# Patient Record
Sex: MALE | Race: OTHER | Employment: UNEMPLOYED | URBAN - METROPOLITAN AREA
[De-identification: names, ages, dates, MRNs, and addresses within clinical notes are randomized per-mention and may not be internally consistent; named-entity substitution may affect disease eponyms.]

---

## 2017-01-02 ENCOUNTER — HOSPITAL ENCOUNTER (OUTPATIENT)
Dept: GENERAL RADIOLOGY | Facility: HOSPITAL | Age: 62
Discharge: HOME | End: 2017-01-02
Attending: FAMILY MEDICINE
Payer: COMMERCIAL

## 2017-01-02 DIAGNOSIS — R51.9 PAIN IN THE HEAD: ICD-10-CM

## 2017-01-02 PROCEDURE — 70250 X-RAY EXAM OF SKULL: CPT

## 2017-03-11 ENCOUNTER — HOSPITAL ENCOUNTER (OUTPATIENT)
Dept: GENERAL RADIOLOGY | Facility: HOSPITAL | Age: 62
Discharge: HOME | End: 2017-03-11
Attending: FAMILY MEDICINE
Payer: COMMERCIAL

## 2017-03-11 DIAGNOSIS — Z01.818 PRE-OP TESTING: ICD-10-CM

## 2017-03-11 PROCEDURE — 71020 XR CHEST PA + LAT CHEST (CPT=71020): CPT

## 2017-03-22 ENCOUNTER — HOSPITAL ENCOUNTER (OUTPATIENT)
Dept: GENERAL RADIOLOGY | Facility: HOSPITAL | Age: 62
Discharge: HOME | End: 2017-03-22
Attending: FAMILY MEDICINE
Payer: COMMERCIAL

## 2017-03-22 DIAGNOSIS — R93.89 ABNORMAL CHEST X-RAY: ICD-10-CM

## 2017-03-22 PROCEDURE — 71020 XR CHEST PA + LAT CHEST (CPT=71020): CPT

## 2017-04-20 ENCOUNTER — HOSPITAL ENCOUNTER (OUTPATIENT)
Dept: GENERAL RADIOLOGY | Facility: HOSPITAL | Age: 62
Discharge: HOME | End: 2017-04-20
Attending: FAMILY MEDICINE
Payer: COMMERCIAL

## 2017-04-20 DIAGNOSIS — M25.521 RIGHT ELBOW PAIN: ICD-10-CM

## 2017-04-20 PROCEDURE — 73080 X-RAY EXAM OF ELBOW: CPT

## 2017-06-29 ENCOUNTER — HOSPITAL ENCOUNTER (OUTPATIENT)
Dept: GENERAL RADIOLOGY | Facility: HOSPITAL | Age: 62
Discharge: HOME | End: 2017-06-29
Attending: FAMILY MEDICINE
Payer: COMMERCIAL

## 2017-06-29 DIAGNOSIS — Z01.818 PRE-OP TESTING: ICD-10-CM

## 2017-06-29 PROCEDURE — 71020 XR CHEST PA + LAT CHEST (CPT=71020): CPT | Performed by: FAMILY MEDICINE

## 2018-01-19 ENCOUNTER — OFFICE VISIT (OUTPATIENT)
Dept: INTERNAL MEDICINE CLINIC | Facility: CLINIC | Age: 63
End: 2018-01-19

## 2018-01-19 VITALS
DIASTOLIC BLOOD PRESSURE: 74 MMHG | OXYGEN SATURATION: 99 % | HEIGHT: 67.5 IN | HEART RATE: 69 BPM | RESPIRATION RATE: 17 BRPM | SYSTOLIC BLOOD PRESSURE: 118 MMHG | BODY MASS INDEX: 33.2 KG/M2 | WEIGHT: 214 LBS

## 2018-01-19 DIAGNOSIS — E11.9 TYPE 2 DIABETES MELLITUS WITHOUT COMPLICATION, WITHOUT LONG-TERM CURRENT USE OF INSULIN (HCC): Primary | ICD-10-CM

## 2018-01-19 DIAGNOSIS — E66.09 CLASS 1 OBESITY DUE TO EXCESS CALORIES WITH SERIOUS COMORBIDITY AND BODY MASS INDEX (BMI) OF 32.0 TO 32.9 IN ADULT: ICD-10-CM

## 2018-01-19 DIAGNOSIS — Z12.5 SCREENING FOR PROSTATE CANCER: ICD-10-CM

## 2018-01-19 DIAGNOSIS — Z12.11 SCREENING FOR COLON CANCER: ICD-10-CM

## 2018-01-19 DIAGNOSIS — L43.9 LICHEN PLANUS: ICD-10-CM

## 2018-01-19 PROBLEM — Z86.008 HISTORY OF CARCINOMA IN SITU OF BLADDER: Status: ACTIVE | Noted: 2018-01-19

## 2018-01-19 PROBLEM — Z98.890 HISTORY OF OPEN REDUCTION AND INTERNAL FIXATION (ORIF) PROCEDURE: Status: ACTIVE | Noted: 2018-01-19

## 2018-01-19 PROBLEM — Z87.891 PERSONAL HISTORY OF TOBACCO USE: Status: ACTIVE | Noted: 2018-01-19

## 2018-01-19 PROBLEM — E66.811 CLASS 1 OBESITY DUE TO EXCESS CALORIES WITH SERIOUS COMORBIDITY AND BODY MASS INDEX (BMI) OF 32.0 TO 32.9 IN ADULT: Status: ACTIVE | Noted: 2018-01-19

## 2018-01-19 LAB
ALBUMIN SERPL BCP-MCNC: 4.3 G/DL (ref 3.5–4.8)
ALBUMIN/GLOB SERPL: 1.5 {RATIO} (ref 1–2)
ALP SERPL-CCNC: 49 U/L (ref 32–100)
ALT SERPL-CCNC: 25 U/L (ref 17–63)
ANION GAP SERPL CALC-SCNC: 11 MMOL/L (ref 0–18)
AST SERPL-CCNC: 24 U/L (ref 15–41)
BILIRUB SERPL-MCNC: 0.8 MG/DL (ref 0.3–1.2)
BUN SERPL-MCNC: 12 MG/DL (ref 8–20)
BUN/CREAT SERPL: 14.3 (ref 10–20)
CALCIUM SERPL-MCNC: 9.3 MG/DL (ref 8.5–10.5)
CHLORIDE SERPL-SCNC: 105 MMOL/L (ref 95–110)
CHOLEST SERPL-MCNC: 131 MG/DL (ref 110–200)
CO2 SERPL-SCNC: 21 MMOL/L (ref 22–32)
CREAT SERPL-MCNC: 0.84 MG/DL (ref 0.5–1.5)
GLOBULIN PLAS-MCNC: 2.9 G/DL (ref 2.5–3.7)
GLUCOSE SERPL-MCNC: 129 MG/DL (ref 70–99)
HBA1C MFR BLD: 6.6 % (ref 4–6)
HDLC SERPL-MCNC: 45 MG/DL
LDLC SERPL CALC-MCNC: 67 MG/DL (ref 0–99)
NONHDLC SERPL-MCNC: 86 MG/DL
OSMOLALITY UR CALC.SUM OF ELEC: 285 MOSM/KG (ref 275–295)
POTASSIUM SERPL-SCNC: 3.9 MMOL/L (ref 3.3–5.1)
PROT SERPL-MCNC: 7.2 G/DL (ref 5.9–8.4)
PSA SERPL-MCNC: 0.6 NG/ML (ref 0–4)
SODIUM SERPL-SCNC: 137 MMOL/L (ref 136–144)
TRIGL SERPL-MCNC: 97 MG/DL (ref 1–149)

## 2018-01-19 PROCEDURE — 83036 HEMOGLOBIN GLYCOSYLATED A1C: CPT | Performed by: FAMILY MEDICINE

## 2018-01-19 PROCEDURE — 99204 OFFICE O/P NEW MOD 45 MIN: CPT | Performed by: FAMILY MEDICINE

## 2018-01-19 PROCEDURE — 80053 COMPREHEN METABOLIC PANEL: CPT | Performed by: FAMILY MEDICINE

## 2018-01-19 PROCEDURE — 36415 COLL VENOUS BLD VENIPUNCTURE: CPT | Performed by: FAMILY MEDICINE

## 2018-01-19 PROCEDURE — 80061 LIPID PANEL: CPT | Performed by: FAMILY MEDICINE

## 2018-01-19 RX ORDER — ASPIRIN 81 MG
TABLET, DELAYED RELEASE (ENTERIC COATED) ORAL
Refills: 1 | COMMUNITY
Start: 2017-12-23 | End: 2018-01-19

## 2018-01-19 RX ORDER — ASPIRIN 81 MG
81 TABLET, DELAYED RELEASE (ENTERIC COATED) ORAL DAILY
Qty: 90 TABLET | Refills: 3 | Status: SHIPPED | OUTPATIENT
Start: 2018-01-19 | End: 2019-01-23

## 2018-01-19 RX ORDER — SIMVASTATIN 20 MG
20 TABLET ORAL NIGHTLY
Qty: 90 TABLET | Refills: 3 | Status: SHIPPED | OUTPATIENT
Start: 2018-01-19 | End: 2019-01-23

## 2018-01-19 RX ORDER — ENALAPRIL MALEATE 5 MG/1
5 TABLET ORAL DAILY
Qty: 90 TABLET | Refills: 3 | Status: SHIPPED | OUTPATIENT
Start: 2018-01-19 | End: 2018-07-26

## 2018-01-19 RX ORDER — SIMVASTATIN 20 MG
TABLET ORAL
Refills: 0 | COMMUNITY
Start: 2017-12-23 | End: 2018-01-19

## 2018-01-19 NOTE — PROGRESS NOTES
HPI:   True Mosqueda is a 58year old male who presents for FASTING LABS AND BECOMING ESTABLISHED.   DM dx at age:  9 YEARS AGO (AT AGE OF 62)    PMHX:  DM, DISTANT H/O EARLY BLADDER CA (35 YEARS AGO-->TREATED AND SUBSEQUENT SURVEILLANCE WAS NEGATIVE), F denies heartburn  NEURO: denies headaches, denies numbness or tingling    EXAM:   /74 (BP Location: Right arm, Patient Position: Sitting, Cuff Size: large)   Pulse 69   Resp 17   Ht 67.5\"   Wt 214 lb   SpO2 99%   BMI 33.02 kg/m²   GENERAL: well deve prostate cancer  -     PSA SCREEN; Future  -     PSA SCREEN    Screening for colon cancer  -     GASTRO - INTERNAL    Lichen planus:  DISCUSSED  -     triamcinolone acetonide 0.1 % External Ointment;  Apply 1 Application topically 2 (two) times daily as nee

## 2018-02-20 ENCOUNTER — TELEPHONE (OUTPATIENT)
Dept: GASTROENTEROLOGY | Facility: CLINIC | Age: 63
End: 2018-02-20

## 2018-02-20 ENCOUNTER — OFFICE VISIT (OUTPATIENT)
Dept: GASTROENTEROLOGY | Facility: CLINIC | Age: 63
End: 2018-02-20

## 2018-02-20 VITALS
WEIGHT: 216.81 LBS | SYSTOLIC BLOOD PRESSURE: 123 MMHG | HEART RATE: 77 BPM | HEIGHT: 68 IN | BODY MASS INDEX: 32.86 KG/M2 | DIASTOLIC BLOOD PRESSURE: 76 MMHG

## 2018-02-20 DIAGNOSIS — Z12.11 SCREENING FOR COLON CANCER: Primary | ICD-10-CM

## 2018-02-20 DIAGNOSIS — Z12.11 COLON CANCER SCREENING: Primary | ICD-10-CM

## 2018-02-20 PROCEDURE — 99243 OFF/OP CNSLTJ NEW/EST LOW 30: CPT | Performed by: INTERNAL MEDICINE

## 2018-02-20 NOTE — PATIENT INSTRUCTIONS
Colonoscopy for colon cancer screening  - colyte prep  - MAC sedation  - hold metformin the day before

## 2018-02-20 NOTE — PROGRESS NOTES
Telma Wells is a 58year old male. HPI:   Patient presents with:  Colonoscopy Screening: Last colon about 11 years ago. No current complaints. FH stomach cancer       The patient is a 15-year-old male who presents for colon cancer screening.   He re denies any chest pain or shortness of breath,  No neurologic or dermatologic symptoms. PHYSICAL EXAM:   Blood pressure 123/76, pulse 77, height 5' 8\" (1.727 m), weight 216 lb 12.8 oz (98.3 kg).     The patient appears their stated age and is in no acut

## 2018-02-20 NOTE — TELEPHONE ENCOUNTER
Scheduled for:  Colon  Provider Name: GIULIA  Date:  4-19-18  Location:  [facility]  Sedation:  MAC  Time:  9:00am, arrival 8:00am  Prep: Colyte  Meds/Allergies Reconciled?:  yes  Diagnosis with codes:  Screening Z12.11   Was patient informed to call insurance with guille

## 2018-04-03 ENCOUNTER — OFFICE VISIT (OUTPATIENT)
Dept: OPHTHALMOLOGY | Facility: CLINIC | Age: 63
End: 2018-04-03

## 2018-04-03 DIAGNOSIS — H25.13 AGE-RELATED NUCLEAR CATARACT OF BOTH EYES: ICD-10-CM

## 2018-04-03 DIAGNOSIS — H43.393 FLOATER, VITREOUS, BILATERAL: ICD-10-CM

## 2018-04-03 DIAGNOSIS — E11.9 TYPE 2 DIABETES MELLITUS WITHOUT RETINOPATHY (HCC): Primary | ICD-10-CM

## 2018-04-03 PROCEDURE — 99212 OFFICE O/P EST SF 10 MIN: CPT | Performed by: OPHTHALMOLOGY

## 2018-04-03 PROCEDURE — 99243 OFF/OP CNSLTJ NEW/EST LOW 30: CPT | Performed by: OPHTHALMOLOGY

## 2018-04-03 NOTE — PROGRESS NOTES
Elida iRcketts is a 58year old male.     HPI:     HPI     Consult    Additional comments: Consult per Dr. Dylan Juárez           Diabetic Eye Exam    Additional comments: Pt has been a diabetic for 10 years  10 years on pills/ 0 years on Insulin   Pt checks his HENT, Cardiovascular, Eyes, Respiratory, Psychiatric, Allergic/Imm, Heme/Lymph    Last edited by Fuentes Kim on 4/3/2018  9:09 AM. (History)          PHYSICAL EXAM:     Base Eye Exam     Visual Acuity (Snellen - Linear)       Right Left    Dist cc 20/20 2 orders of the defined types were placed in this encounter.       Meds This Visit:    No prescriptions requested or ordered in this encounter     Follow up instructions:  Return in about 1 year (around 4/3/2019) for Diabetic eye exam.    4/3/2018  Scribed by

## 2018-04-03 NOTE — PATIENT INSTRUCTIONS
Type 2 diabetes mellitus without retinopathy ([facility] [address]  Diabetes type II: no background of retinopathy, no signs of neovascularization noted. Discussed ocular and systemic benefits of blood sugar control.   Diagnosis and treatment discussed in detail with tiffani

## 2018-04-16 ENCOUNTER — OFFICE VISIT (OUTPATIENT)
Dept: INTERNAL MEDICINE CLINIC | Facility: CLINIC | Age: 63
End: 2018-04-16

## 2018-04-16 VITALS
RESPIRATION RATE: 17 BRPM | SYSTOLIC BLOOD PRESSURE: 118 MMHG | WEIGHT: 213 LBS | HEART RATE: 67 BPM | OXYGEN SATURATION: 99 % | BODY MASS INDEX: 32.28 KG/M2 | HEIGHT: 68 IN | DIASTOLIC BLOOD PRESSURE: 80 MMHG

## 2018-04-16 DIAGNOSIS — M43.6 TORTICOLLIS, ACQUIRED: Primary | ICD-10-CM

## 2018-04-16 DIAGNOSIS — Z86.008 HISTORY OF CARCINOMA IN SITU OF BLADDER: ICD-10-CM

## 2018-04-16 PROCEDURE — 88108 CYTOPATH CONCENTRATE TECH: CPT | Performed by: FAMILY MEDICINE

## 2018-04-16 PROCEDURE — 98925 OSTEOPATH MANJ 1-2 REGIONS: CPT | Performed by: FAMILY MEDICINE

## 2018-04-16 PROCEDURE — 99213 OFFICE O/P EST LOW 20 MIN: CPT | Performed by: FAMILY MEDICINE

## 2018-04-16 RX ORDER — CYCLOBENZAPRINE HCL 10 MG
10 TABLET ORAL 3 TIMES DAILY PRN
Qty: 30 TABLET | Refills: 1 | Status: SHIPPED | OUTPATIENT
Start: 2018-04-16 | End: 2018-05-06

## 2018-04-16 RX ORDER — IBUPROFEN 600 MG/1
600 TABLET ORAL EVERY 8 HOURS PRN
Qty: 30 TABLET | Refills: 1 | Status: SHIPPED | OUTPATIENT
Start: 2018-04-16 | End: 2019-02-21 | Stop reason: CLARIF

## 2018-04-16 NOTE — PROGRESS NOTES
CC:  Neck Pain (Pt presents to clinic for c/o right sided neck pain after morning shower yesterday-->painful to move neck. )      Hx of CC:  PAIN AND STIFFNESS OVER RIGHT SIDE OF NECK SINCE YESTERDAY--NO ACUTE INJURY.     DISTANT H/O BLADDER CA (ABOUT 20 YE

## 2018-04-19 ENCOUNTER — ANESTHESIA EVENT (OUTPATIENT)
Dept: ENDOSCOPY | Facility: HOSPITAL | Age: 63
End: 2018-04-19
Payer: MEDICAID

## 2018-04-19 ENCOUNTER — SURGERY (OUTPATIENT)
Age: 63
End: 2018-04-19

## 2018-04-19 ENCOUNTER — TELEPHONE (OUTPATIENT)
Dept: GASTROENTEROLOGY | Facility: CLINIC | Age: 63
End: 2018-04-19

## 2018-04-19 ENCOUNTER — ANESTHESIA (OUTPATIENT)
Dept: ENDOSCOPY | Facility: HOSPITAL | Age: 63
End: 2018-04-19
Payer: MEDICAID

## 2018-04-19 ENCOUNTER — HOSPITAL ENCOUNTER (OUTPATIENT)
Facility: HOSPITAL | Age: 63
Setting detail: HOSPITAL OUTPATIENT SURGERY
Discharge: HOME | End: 2018-04-19
Attending: INTERNAL MEDICINE | Admitting: INTERNAL MEDICINE
Payer: MEDICAID

## 2018-04-19 VITALS
DIASTOLIC BLOOD PRESSURE: 87 MMHG | WEIGHT: 213 LBS | OXYGEN SATURATION: 96 % | SYSTOLIC BLOOD PRESSURE: 123 MMHG | HEIGHT: 66 IN | BODY MASS INDEX: 34.23 KG/M2 | HEART RATE: 67 BPM | RESPIRATION RATE: 14 BRPM

## 2018-04-19 DIAGNOSIS — Z12.11 SCREENING FOR COLON CANCER: ICD-10-CM

## 2018-04-19 DIAGNOSIS — K57.90 DIVERTICULOSIS: Primary | ICD-10-CM

## 2018-04-19 PROCEDURE — 0DJD8ZZ INSPECTION OF LOWER INTESTINAL TRACT, VIA NATURAL OR ARTIFICIAL OPENING ENDOSCOPIC: ICD-10-PCS | Performed by: INTERNAL MEDICINE

## 2018-04-19 PROCEDURE — 45378 DIAGNOSTIC COLONOSCOPY: CPT | Performed by: INTERNAL MEDICINE

## 2018-04-19 RX ORDER — SODIUM CHLORIDE, SODIUM LACTATE, POTASSIUM CHLORIDE, CALCIUM CHLORIDE 600; 310; 30; 20 MG/100ML; MG/100ML; MG/100ML; MG/100ML
INJECTION, SOLUTION INTRAVENOUS CONTINUOUS
Status: DISCONTINUED | OUTPATIENT
Start: 2018-04-19 | End: 2018-04-19

## 2018-04-19 RX ORDER — NALOXONE HYDROCHLORIDE 0.4 MG/ML
80 INJECTION, SOLUTION INTRAMUSCULAR; INTRAVENOUS; SUBCUTANEOUS AS NEEDED
Status: DISCONTINUED | OUTPATIENT
Start: 2018-04-19 | End: 2018-04-19

## 2018-04-19 RX ORDER — DEXTROSE MONOHYDRATE 25 G/50ML
50 INJECTION, SOLUTION INTRAVENOUS
Status: DISCONTINUED | OUTPATIENT
Start: 2018-04-19 | End: 2018-04-19

## 2018-04-19 RX ORDER — LIDOCAINE HYDROCHLORIDE 10 MG/ML
INJECTION, SOLUTION EPIDURAL; INFILTRATION; INTRACAUDAL; PERINEURAL AS NEEDED
Status: DISCONTINUED | OUTPATIENT
Start: 2018-04-19 | End: 2018-04-19 | Stop reason: SURG

## 2018-04-19 RX ORDER — SODIUM CHLORIDE 0.9 % (FLUSH) 0.9 %
10 SYRINGE (ML) INJECTION AS NEEDED
Status: DISCONTINUED | OUTPATIENT
Start: 2018-04-19 | End: 2018-04-19

## 2018-04-19 RX ADMIN — SODIUM CHLORIDE, SODIUM LACTATE, POTASSIUM CHLORIDE, CALCIUM CHLORIDE: 600; 310; 30; 20 INJECTION, SOLUTION INTRAVENOUS at 09:20:00

## 2018-04-19 RX ADMIN — LIDOCAINE HYDROCHLORIDE 50 MG: 10 INJECTION, SOLUTION EPIDURAL; INFILTRATION; INTRACAUDAL; PERINEURAL at 09:04:00

## 2018-04-19 RX ADMIN — SODIUM CHLORIDE, SODIUM LACTATE, POTASSIUM CHLORIDE, CALCIUM CHLORIDE: 600; 310; 30; 20 INJECTION, SOLUTION INTRAVENOUS at 08:59:00

## 2018-04-19 NOTE — TELEPHONE ENCOUNTER
10 year CLN recall placed in system. Snapshot updated. CLN done on 4/19/18 and done on 4/19/2028. No further action required at this time.

## 2018-04-19 NOTE — ANESTHESIA PREPROCEDURE EVALUATION
Anesthesia PreOp Note    HPI:     Merly Ricketts is a 58year old male who presents for preoperative consultation requested by: Joanie Lopez MD    Date of Surgery: 4/19/2018    Procedure(s):  COLONOSCOPY  Indication: Screening for colon cancer ASPIRIN EC LOW DOSE 81 MG Oral Tab EC Take 1 tablet (81 mg total) by mouth daily.  Disp: 90 tablet Rfl: 3 Taking       Current Facility-Administered Medications Ordered in Epic:  lactated ringers infusion  Intravenous Continuous Bo Wolf MD     N controlled,   Abdominal  - normal exam             Anesthesia Plan:   ASA:  2  Plan:   MAC  Post-op Pain Management: IV analgesics  Informed Consent Plan and Risks Discussed With:  Patient and spouse  Discussed plan with:  CRNA      I have informed Bhargav Bhatia

## 2018-04-19 NOTE — H&P
History & Physical Examination    Patient Name: Arabella Yarbrough  MRN: [id number]  [facility]: [id number]  YOB: 1955    Diagnosis: colon screening    Prescriptions Prior to Admission:  Cyclobenzaprine HCl 10 MG Oral Tab Take 1 tablet (10 mg total) b ] [x ]    OTHER        [ x ] I have discussed the risks and benefits and alternatives with the patient/family. They understand and agree to proceed with plan of care. [ x ] I have reviewed the History and Physical done within the last 30 days.   Any carrillo

## 2018-04-19 NOTE — OPERATIVE REPORT
[facility] Endoscopy Report      Preoperative Diagnosis:  - colon cancer screening, last colonoscopy 11 years ago      Postoperative Diagnosis:  - diverticulosis  - internal hemorrhoids      Procedure:    Colonoscopy       Surgeon:  Royce Mckeon

## 2018-04-19 NOTE — ANESTHESIA POSTPROCEDURE EVALUATION
Patient: Fady Brito    Procedure Summary     Date:  04/19/18 Room / Location:  [address] [place] [address] ENDOSCOPY 05 / [address] [place] [address] ENDOSCOPY    Anesthesia Start:  8883 Anesthesia Stop:  9946    Procedure:  COLONOSCOPY (N/A ) Diagnosis:       Screening for colon cancer      (Dive

## 2018-06-12 ENCOUNTER — OFFICE VISIT (OUTPATIENT)
Dept: INTERNAL MEDICINE CLINIC | Facility: CLINIC | Age: 63
End: 2018-06-12

## 2018-06-12 VITALS
OXYGEN SATURATION: 98 % | SYSTOLIC BLOOD PRESSURE: 120 MMHG | HEIGHT: 68 IN | DIASTOLIC BLOOD PRESSURE: 76 MMHG | WEIGHT: 213 LBS | RESPIRATION RATE: 18 BRPM | BODY MASS INDEX: 32.28 KG/M2 | HEART RATE: 71 BPM

## 2018-06-12 DIAGNOSIS — G62.9 POSTOPERATIVE NEURITIS OF LEFT ARM: ICD-10-CM

## 2018-06-12 DIAGNOSIS — R05.8 RECURRENT DRY COUGH: ICD-10-CM

## 2018-06-12 DIAGNOSIS — K21.9 GASTROESOPHAGEAL REFLUX DISEASE, ESOPHAGITIS PRESENCE NOT SPECIFIED: Primary | ICD-10-CM

## 2018-06-12 DIAGNOSIS — G97.82 POSTOPERATIVE NEURITIS OF LEFT ARM: ICD-10-CM

## 2018-06-12 PROCEDURE — 99213 OFFICE O/P EST LOW 20 MIN: CPT | Performed by: FAMILY MEDICINE

## 2018-06-12 RX ORDER — GABAPENTIN 100 MG/1
100 CAPSULE ORAL 3 TIMES DAILY
Qty: 90 CAPSULE | Refills: 2 | Status: SHIPPED | OUTPATIENT
Start: 2018-06-12 | End: 2019-02-21 | Stop reason: ALTCHOICE

## 2018-06-12 RX ORDER — PANTOPRAZOLE SODIUM 40 MG/1
40 TABLET, DELAYED RELEASE ORAL
Qty: 90 TABLET | Refills: 0 | Status: SHIPPED | OUTPATIENT
Start: 2018-06-12 | End: 2018-10-23

## 2018-06-13 NOTE — PROGRESS NOTES
CC:  Reflux (Pt presents to clinic for c/o acid reflux x 3 months-->worse after late night meals. ) and Cough (Dry/hacky cough x 3 months. )      Hx of CC:  WORSENING ACID REFLUX AND COUGH LATELY. WORSE AT BEDTIME.     H/O DISTANT JOB INJURY TO LEFT ARM (L the defined types were placed in this encounter.

## 2018-07-26 ENCOUNTER — OFFICE VISIT (OUTPATIENT)
Dept: INTERNAL MEDICINE CLINIC | Facility: CLINIC | Age: 63
End: 2018-07-26
Payer: MEDICAID

## 2018-07-26 VITALS
HEART RATE: 67 BPM | BODY MASS INDEX: 32.13 KG/M2 | TEMPERATURE: 99 F | OXYGEN SATURATION: 97 % | WEIGHT: 212 LBS | DIASTOLIC BLOOD PRESSURE: 80 MMHG | HEIGHT: 68 IN | SYSTOLIC BLOOD PRESSURE: 110 MMHG

## 2018-07-26 DIAGNOSIS — Z98.890 HISTORY OF OPEN REDUCTION AND INTERNAL FIXATION (ORIF) PROCEDURE: ICD-10-CM

## 2018-07-26 DIAGNOSIS — G56.92 NEUROPATHY, ARM, LEFT: ICD-10-CM

## 2018-07-26 DIAGNOSIS — K21.9 GASTROESOPHAGEAL REFLUX DISEASE, ESOPHAGITIS PRESENCE NOT SPECIFIED: ICD-10-CM

## 2018-07-26 DIAGNOSIS — E11.9 TYPE 2 DIABETES MELLITUS WITHOUT RETINOPATHY (HCC): Primary | ICD-10-CM

## 2018-07-26 DIAGNOSIS — R05.9 COUGH: ICD-10-CM

## 2018-07-26 DIAGNOSIS — G47.19 EXCESSIVE DAYTIME SLEEPINESS: ICD-10-CM

## 2018-07-26 LAB
ALBUMIN SERPL BCP-MCNC: 4.3 G/DL (ref 3.5–4.8)
ALBUMIN/GLOB SERPL: 1.6 {RATIO} (ref 1–2)
ALP SERPL-CCNC: 45 U/L (ref 32–100)
ALT SERPL-CCNC: 27 U/L (ref 17–63)
ANION GAP SERPL CALC-SCNC: 11 MMOL/L (ref 0–18)
AST SERPL-CCNC: 26 U/L (ref 15–41)
BILIRUB SERPL-MCNC: 0.6 MG/DL (ref 0.3–1.2)
BUN SERPL-MCNC: 13 MG/DL (ref 8–20)
BUN/CREAT SERPL: 15.7 (ref 10–20)
CALCIUM SERPL-MCNC: 9.3 MG/DL (ref 8.5–10.5)
CHLORIDE SERPL-SCNC: 105 MMOL/L (ref 95–110)
CHOLEST SERPL-MCNC: 134 MG/DL (ref 110–200)
CO2 SERPL-SCNC: 21 MMOL/L (ref 22–32)
CREAT SERPL-MCNC: 0.83 MG/DL (ref 0.5–1.5)
GLOBULIN PLAS-MCNC: 2.7 G/DL (ref 2.5–3.7)
GLUCOSE SERPL-MCNC: 128 MG/DL (ref 70–99)
HDLC SERPL-MCNC: 48 MG/DL
LDLC SERPL CALC-MCNC: 69 MG/DL (ref 0–99)
NONHDLC SERPL-MCNC: 86 MG/DL
OSMOLALITY UR CALC.SUM OF ELEC: 286 MOSM/KG (ref 275–295)
PATIENT FASTING: YES
POTASSIUM SERPL-SCNC: 3.8 MMOL/L (ref 3.3–5.1)
PROT SERPL-MCNC: 7 G/DL (ref 5.9–8.4)
SODIUM SERPL-SCNC: 137 MMOL/L (ref 136–144)
TRIGL SERPL-MCNC: 84 MG/DL (ref 1–149)

## 2018-07-26 PROCEDURE — 80061 LIPID PANEL: CPT | Performed by: FAMILY MEDICINE

## 2018-07-26 PROCEDURE — 80053 COMPREHEN METABOLIC PANEL: CPT | Performed by: FAMILY MEDICINE

## 2018-07-26 PROCEDURE — 99214 OFFICE O/P EST MOD 30 MIN: CPT | Performed by: FAMILY MEDICINE

## 2018-07-26 PROCEDURE — 83036 HEMOGLOBIN GLYCOSYLATED A1C: CPT | Performed by: FAMILY MEDICINE

## 2018-07-26 PROCEDURE — 36415 COLL VENOUS BLD VENIPUNCTURE: CPT | Performed by: FAMILY MEDICINE

## 2018-07-26 RX ORDER — LOSARTAN POTASSIUM 50 MG/1
50 TABLET ORAL DAILY
Qty: 90 TABLET | Refills: 3 | Status: SHIPPED | OUTPATIENT
Start: 2018-07-26 | End: 2019-07-16

## 2018-07-26 NOTE — PROGRESS NOTES
Pt's  verified  Pt presented for a fasting blood draw. Per DR Vela Seat ordered GLYCO CMP LIPID. Pt tolerated venipuncture well,specimens drawn from RT  arm  and sent out to [address] [place] [address] lab for testing.

## 2018-07-27 LAB — HBA1C MFR BLD: 6.3 % (ref 4–6)

## 2018-07-27 NOTE — PROGRESS NOTES
HPI:   Rayma Favre is a 58year old male who presents for recheck of his diabetes. DM dx at age:  5+ years ago  Current medications:   Current Outpatient Prescriptions:  Losartan Potassium 50 MG Oral Tab Take 1 tablet (50 mg total) by mouth daily.  [facility] lb  04/16/18 : 213 lb  02/20/18 : 216 lb 12.8 oz  01/19/18 : 214 lb    Body mass index is 32.23 kg/m².        Glycohemoglobin (HgA1c) (%)   Date Value   01/19/2018 6.6 (H)   ----------  HDL Cholesterol (mg/dL)   Date Value   07/26/2018 48   01/19/2018 45 both lower legs/feet is normal as well. ASSESSMENT AND PLAN:   Donald Ortega is a 58year old male who presents for a recheck of his diabetes. Kenny Begum was seen today for follow - up and diabetes.     Diagnoses and all orders for this visit:    Type 2

## 2018-10-23 DIAGNOSIS — K21.9 GASTROESOPHAGEAL REFLUX DISEASE, ESOPHAGITIS PRESENCE NOT SPECIFIED: ICD-10-CM

## 2018-10-23 RX ORDER — PANTOPRAZOLE SODIUM 40 MG/1
40 TABLET, DELAYED RELEASE ORAL DAILY PRN
Qty: 90 TABLET | Refills: 0 | Status: SHIPPED | OUTPATIENT
Start: 2018-10-23 | End: 2018-11-20

## 2018-11-20 ENCOUNTER — OFFICE VISIT (OUTPATIENT)
Dept: FAMILY MEDICINE CLINIC | Facility: CLINIC | Age: 63
End: 2018-11-20
Payer: MEDICAID

## 2018-11-20 VITALS
WEIGHT: 219 LBS | HEIGHT: 65 IN | DIASTOLIC BLOOD PRESSURE: 68 MMHG | BODY MASS INDEX: 36.49 KG/M2 | SYSTOLIC BLOOD PRESSURE: 114 MMHG | RESPIRATION RATE: 16 BRPM | TEMPERATURE: 98 F | OXYGEN SATURATION: 98 % | HEART RATE: 64 BPM

## 2018-11-20 DIAGNOSIS — K21.9 GASTROESOPHAGEAL REFLUX DISEASE, ESOPHAGITIS PRESENCE NOT SPECIFIED: ICD-10-CM

## 2018-11-20 DIAGNOSIS — H69.83 DYSFUNCTION OF BOTH EUSTACHIAN TUBES: Primary | ICD-10-CM

## 2018-11-20 PROCEDURE — 99212 OFFICE O/P EST SF 10 MIN: CPT | Performed by: NURSE PRACTITIONER

## 2018-11-20 RX ORDER — FLUTICASONE PROPIONATE 50 MCG
2 SPRAY, SUSPENSION (ML) NASAL NIGHTLY
Qty: 1 BOTTLE | Refills: 2 | Status: SHIPPED | OUTPATIENT
Start: 2018-11-20 | End: 2019-02-21 | Stop reason: ALTCHOICE

## 2018-11-20 RX ORDER — LORATADINE 10 MG/1
10 TABLET ORAL DAILY
Qty: 30 TABLET | Refills: 2 | Status: SHIPPED | OUTPATIENT
Start: 2018-11-20 | End: 2019-02-21 | Stop reason: ALTCHOICE

## 2018-11-20 RX ORDER — PANTOPRAZOLE SODIUM 40 MG/1
40 TABLET, DELAYED RELEASE ORAL DAILY PRN
Qty: 30 TABLET | Refills: 2 | Status: SHIPPED | OUTPATIENT
Start: 2018-11-20 | End: 2019-02-21 | Stop reason: ALTCHOICE

## 2018-11-20 NOTE — PROGRESS NOTES
CHIEF COMPLAINT:   Patient presents with:  Ear Pain: muffled hearing and pressure in both ears        HPI:   Mery Liriano is a 61year old male who presents for  bilateral ear fullness sensation, muffled hearing and intermittent pain mainly at night.  P Problem Relation Age of Onset   • Cancer Father    • Diabetes Mother    • Cataracts Sister    • Glaucoma Neg    • Macular degeneration Neg       Social History    Tobacco Use      Smoking status: Never Smoker      Smokeless tobacco: Never Used    Alcohol u Sig: Take 1 tablet (10 mg total) by mouth daily. Risk and benefits of medication discussed. Tylenol/Motrin prn pain. Warm compress to affected ear.   Equalize ear pressure often by yawning or chewing   Call or return if s/sx worsen, do not imp ?Sudden air pressure changes – Sudden air pressure changes can happen when people fly in an airplane, scuba dive, or drive in the mountains. ?Growths that block the eustachian tube  ? Being born with an abnormal eustachian tube    What are the symptoms of -Sudafed (pseudoephedrine)- according to package instructions. ? Antibiotic medicines – Antibiotics are not needed to treat eustachian tube problems.  But if you have an infection caused by bacteria in addition to your eustachian tube problems, your doc Si no se siente mejor después de los delia [address] [facility], es posible que haya que practicarle ernst cirugía para extraer el fluido e insertar un pequeño tubo en el tímpano a fin de que el drenaje no se interrumpa.   Dado que el fluido del oído medio se puede [employer], © 0809-1659 [employer] [address]. [address] [facility], [address] [facility]. Todos los derechos reservados. Esta información no pretende sustituir la atención médica profesional. Sólo mejia médico puede diagnosticar y tratar un problema de deborah.

## 2018-11-20 NOTE — PATIENT INSTRUCTIONS
Eustachian tube problems  Written by the doctors and editors at [facility]     What is the eustachian tube? — The eustachian tube is a tube that connects the middle ear (the part of the ear behind the eardrum) to the back of the nose and throat       Sai Jones Should I see a doctor or nurse? — See your doctor or nurse if your symptoms are severe, get worse, or if they don’t go away after a few days. Will I need tests? — Probably not.  Your doctor or nurse should be able to tell if you have a eustachian tube pr [address] [facility], sin infección [Fluid In The Middle Ear, Adult, Otitis Serosa]  El dolor de oídos puede darse sin que haya ernst infección.  Puede suceder cuando se acumulan aire y líquido detrás del tímpano, lo que ocasiona dolor y reduce la capaci · Puede usar medicamentos de venta joanna, según le hayan indicado para aliviar el dolor, a menos que le hayan recetado otros medicamentos.  [NOTA: Si tiene ernst enfermedad hepática o renal crónica, o ha tenido alguna vez ernst úlcera estomacal o sangrado gastr

## 2019-01-23 DIAGNOSIS — E11.9 TYPE 2 DIABETES MELLITUS WITHOUT COMPLICATION, WITHOUT LONG-TERM CURRENT USE OF INSULIN (HCC): ICD-10-CM

## 2019-01-23 RX ORDER — ASPIRIN 81 MG
TABLET, DELAYED RELEASE (ENTERIC COATED) ORAL
Qty: 90 TABLET | Refills: 0 | Status: SHIPPED | OUTPATIENT
Start: 2019-01-23 | End: 2019-07-16 | Stop reason: ALTCHOICE

## 2019-01-23 RX ORDER — SIMVASTATIN 20 MG
TABLET ORAL
Qty: 90 TABLET | Refills: 0 | Status: SHIPPED | OUTPATIENT
Start: 2019-01-23 | End: 2019-07-16

## 2019-02-21 ENCOUNTER — OFFICE VISIT (OUTPATIENT)
Dept: INTERNAL MEDICINE CLINIC | Facility: CLINIC | Age: 64
End: 2019-02-21
Payer: MEDICAID

## 2019-02-21 VITALS
RESPIRATION RATE: 17 BRPM | OXYGEN SATURATION: 99 % | WEIGHT: 215 LBS | BODY MASS INDEX: 35.82 KG/M2 | SYSTOLIC BLOOD PRESSURE: 118 MMHG | DIASTOLIC BLOOD PRESSURE: 82 MMHG | HEIGHT: 65 IN | HEART RATE: 70 BPM

## 2019-02-21 DIAGNOSIS — Z12.5 PROSTATE CANCER SCREENING: ICD-10-CM

## 2019-02-21 DIAGNOSIS — E11.9 TYPE 2 DIABETES MELLITUS WITHOUT RETINOPATHY (HCC): ICD-10-CM

## 2019-02-21 DIAGNOSIS — H10.9 CONJUNCTIVITIS OF BOTH EYES, UNSPECIFIED CONJUNCTIVITIS TYPE: ICD-10-CM

## 2019-02-21 DIAGNOSIS — E11.9 TYPE 2 DIABETES MELLITUS WITHOUT COMPLICATION, WITHOUT LONG-TERM CURRENT USE OF INSULIN (HCC): ICD-10-CM

## 2019-02-21 DIAGNOSIS — N52.9 ERECTILE DYSFUNCTION, UNSPECIFIED ERECTILE DYSFUNCTION TYPE: ICD-10-CM

## 2019-02-21 DIAGNOSIS — K21.9 GASTROESOPHAGEAL REFLUX DISEASE, ESOPHAGITIS PRESENCE NOT SPECIFIED: ICD-10-CM

## 2019-02-21 DIAGNOSIS — Z00.00 ANNUAL PHYSICAL EXAM: Primary | ICD-10-CM

## 2019-02-21 LAB
ALBUMIN SERPL-MCNC: 4.1 G/DL (ref 3.4–5)
ALBUMIN/GLOB SERPL: 1.2 {RATIO} (ref 1–2)
ALP LIVER SERPL-CCNC: 60 U/L (ref 45–117)
ALT SERPL-CCNC: 34 U/L (ref 16–61)
ANION GAP SERPL CALC-SCNC: 5 MMOL/L (ref 0–18)
AST SERPL-CCNC: 17 U/L (ref 15–37)
BILIRUB SERPL-MCNC: 0.6 MG/DL (ref 0.1–2)
BUN BLD-MCNC: 12 MG/DL (ref 7–18)
BUN/CREAT SERPL: 14.8 (ref 10–20)
CALCIUM BLD-MCNC: 9.4 MG/DL (ref 8.5–10.1)
CHLORIDE SERPL-SCNC: 104 MMOL/L (ref 98–107)
CHOLEST SMN-MCNC: 129 MG/DL (ref ?–200)
CO2 SERPL-SCNC: 28 MMOL/L (ref 21–32)
COMPLEXED PSA SERPL-MCNC: 0.58 NG/ML (ref ?–4)
CREAT BLD-MCNC: 0.81 MG/DL (ref 0.7–1.3)
EST. AVERAGE GLUCOSE BLD GHB EST-MCNC: 154 MG/DL (ref 68–126)
GLOBULIN PLAS-MCNC: 3.5 G/DL (ref 2.8–4.4)
GLUCOSE BLD-MCNC: 134 MG/DL (ref 70–99)
HBA1C MFR BLD HPLC: 7 % (ref ?–5.7)
HDLC SERPL-MCNC: 52 MG/DL (ref 40–59)
LDLC SERPL CALC-MCNC: 58 MG/DL (ref ?–100)
M PROTEIN MFR SERPL ELPH: 7.6 G/DL (ref 6.4–8.2)
NONHDLC SERPL-MCNC: 77 MG/DL (ref ?–130)
OSMOLALITY SERPL CALC.SUM OF ELEC: 286 MOSM/KG (ref 275–295)
POTASSIUM SERPL-SCNC: 4.2 MMOL/L (ref 3.5–5.1)
SODIUM SERPL-SCNC: 137 MMOL/L (ref 136–145)
TRIGL SERPL-MCNC: 94 MG/DL (ref 30–149)

## 2019-02-21 PROCEDURE — 80061 LIPID PANEL: CPT | Performed by: FAMILY MEDICINE

## 2019-02-21 PROCEDURE — 99396 PREV VISIT EST AGE 40-64: CPT | Performed by: FAMILY MEDICINE

## 2019-02-21 PROCEDURE — 80053 COMPREHEN METABOLIC PANEL: CPT | Performed by: FAMILY MEDICINE

## 2019-02-21 PROCEDURE — 83036 HEMOGLOBIN GLYCOSYLATED A1C: CPT | Performed by: FAMILY MEDICINE

## 2019-02-21 RX ORDER — SILDENAFIL 100 MG/1
100 TABLET, FILM COATED ORAL
Qty: 20 TABLET | Refills: 3 | Status: SHIPPED | OUTPATIENT
Start: 2019-02-21 | End: 2020-08-10

## 2019-02-21 RX ORDER — KETOTIFEN FUMARATE 0.35 MG/ML
1 SOLUTION/ DROPS OPHTHALMIC 2 TIMES DAILY
Qty: 10 ML | Refills: 1 | Status: SHIPPED | OUTPATIENT
Start: 2019-02-21 | End: 2020-04-04 | Stop reason: ALTCHOICE

## 2019-02-21 RX ORDER — SIMVASTATIN 20 MG
TABLET ORAL
Qty: 90 TABLET | Refills: 0 | Status: SHIPPED | OUTPATIENT
Start: 2019-02-21 | End: 2019-05-28

## 2019-02-21 RX ORDER — RANITIDINE 150 MG/1
150 TABLET ORAL 2 TIMES DAILY PRN
Qty: 60 TABLET | Refills: 5 | Status: SHIPPED | OUTPATIENT
Start: 2019-02-21 | End: 2019-07-16

## 2019-02-21 NOTE — PROGRESS NOTES
Pt presented to clinic today for blood draw. Per physician able to draw orders. Orders  documented within chart. Pt tolerated lab draw well.  verified.   Orders drawn include: PSA, Lipid, A1C, and CMP  Site of draw: right arm

## 2019-02-23 NOTE — PROGRESS NOTES
CC:  Physical (Pt presents to clinic for annual physical and fasting labs.) and Eye Problem (C/o c/l eye itchiness x 1 wk. )      Hx of CC:  ANNUAL PHYSICAL AND FASTING LABS. C/O BILATERAL EYE ITCHINESS X 1-2 WEEKS.     Vitals:    02/21/19  1005   BP: 118/ times daily. Dispense: 10 mL; Refill: 1    5. Erectile dysfunction, unspecified erectile dysfunction type    - Sildenafil Citrate 100 MG Oral Tab; Take 1 tablet (100 mg total) by mouth daily as needed for Erectile Dysfunction. Dispense: 20 tablet;  Refill

## 2019-03-05 ENCOUNTER — OFFICE VISIT (OUTPATIENT)
Dept: OPTOMETRY | Facility: CLINIC | Age: 64
End: 2019-03-05
Payer: MEDICAID

## 2019-03-05 DIAGNOSIS — H25.13 AGE-RELATED NUCLEAR CATARACT OF BOTH EYES: ICD-10-CM

## 2019-03-05 DIAGNOSIS — E11.9 TYPE 2 DIABETES MELLITUS WITHOUT RETINOPATHY (HCC): Primary | ICD-10-CM

## 2019-03-05 DIAGNOSIS — H52.03 HYPEROPIA WITH ASTIGMATISM AND PRESBYOPIA, BILATERAL: ICD-10-CM

## 2019-03-05 DIAGNOSIS — H52.203 HYPEROPIA WITH ASTIGMATISM AND PRESBYOPIA, BILATERAL: ICD-10-CM

## 2019-03-05 DIAGNOSIS — H52.4 HYPEROPIA WITH ASTIGMATISM AND PRESBYOPIA, BILATERAL: ICD-10-CM

## 2019-03-05 PROCEDURE — 92015 DETERMINE REFRACTIVE STATE: CPT | Performed by: OPTOMETRIST

## 2019-03-05 PROCEDURE — 92014 COMPRE OPH EXAM EST PT 1/>: CPT | Performed by: OPTOMETRIST

## 2019-03-05 NOTE — PROGRESS NOTES
Garret Dowling is a 61year old male. HPI:     HPI     Diabetic Eye Exam     Diabetes characteristics include Type 2, controlled with diet and taking oral medications. Duration of 11 years. Number of years diabetic 6. Number of years on pills 11. DAILY WITH MEALS Disp: 180 tablet Rfl: 0   ASPIR-LOW 81 MG Oral Tab EC TAKE 1 TABLET BY MOUTH DAILY Disp: 90 tablet Rfl: 0   Losartan Potassium 50 MG Oral Tab Take 1 tablet (50 mg total) by mouth daily.  Disp: 90 tablet Rfl: 3   triamcinolone acetonide 0.1 Vitreous floaters          Fundus Exam       Right Left    Disc Good rim, Temporal crescent Good rim, Temporal crescent    C/D Ratio 0.5 0.5    Macula Normal- no BDR Normal- no BDR    Vessels Normal Normal    Periphery Normal Normal            Refraction

## 2019-03-05 NOTE — PATIENT INSTRUCTIONS
Age-related nuclear cataract of both eyes  No treatment is required. Will continue to observe. Hyperopia with astigmatism and presbyopia, bilateral  New glasses RX given to update as needed.       Type 2 diabetes mellitus without retinopathy ([address]  I adv

## 2019-04-27 DIAGNOSIS — E11.9 TYPE 2 DIABETES MELLITUS WITHOUT COMPLICATION, WITHOUT LONG-TERM CURRENT USE OF INSULIN (HCC): ICD-10-CM

## 2019-05-28 DIAGNOSIS — E11.9 TYPE 2 DIABETES MELLITUS WITHOUT COMPLICATION, WITHOUT LONG-TERM CURRENT USE OF INSULIN (HCC): ICD-10-CM

## 2019-05-28 RX ORDER — SIMVASTATIN 20 MG
TABLET ORAL
Qty: 90 TABLET | Refills: 0 | Status: SHIPPED | OUTPATIENT
Start: 2019-05-28 | End: 2019-07-16

## 2019-07-16 ENCOUNTER — OFFICE VISIT (OUTPATIENT)
Dept: INTERNAL MEDICINE CLINIC | Facility: CLINIC | Age: 64
End: 2019-07-16
Payer: MEDICAID

## 2019-07-16 VITALS
WEIGHT: 214 LBS | RESPIRATION RATE: 17 BRPM | SYSTOLIC BLOOD PRESSURE: 118 MMHG | HEART RATE: 82 BPM | DIASTOLIC BLOOD PRESSURE: 76 MMHG | HEIGHT: 65 IN | BODY MASS INDEX: 35.65 KG/M2 | OXYGEN SATURATION: 99 %

## 2019-07-16 DIAGNOSIS — E11.9 TYPE 2 DIABETES MELLITUS WITHOUT RETINOPATHY (HCC): ICD-10-CM

## 2019-07-16 DIAGNOSIS — E66.09 CLASS 1 OBESITY DUE TO EXCESS CALORIES WITH SERIOUS COMORBIDITY AND BODY MASS INDEX (BMI) OF 32.0 TO 32.9 IN ADULT: ICD-10-CM

## 2019-07-16 DIAGNOSIS — E11.9 TYPE 2 DIABETES MELLITUS WITHOUT COMPLICATION, WITHOUT LONG-TERM CURRENT USE OF INSULIN (HCC): Primary | ICD-10-CM

## 2019-07-16 DIAGNOSIS — J02.9 PHARYNGITIS, UNSPECIFIED ETIOLOGY: ICD-10-CM

## 2019-07-16 DIAGNOSIS — Z23 NEED FOR VACCINATION FOR STREP PNEUMONIAE: ICD-10-CM

## 2019-07-16 DIAGNOSIS — K21.9 GASTROESOPHAGEAL REFLUX DISEASE, ESOPHAGITIS PRESENCE NOT SPECIFIED: ICD-10-CM

## 2019-07-16 LAB
ALBUMIN SERPL-MCNC: 3.9 G/DL (ref 3.4–5)
ALBUMIN/GLOB SERPL: 1.2 {RATIO} (ref 1–2)
ALP LIVER SERPL-CCNC: 57 U/L (ref 45–117)
ALT SERPL-CCNC: 25 U/L (ref 16–61)
ANION GAP SERPL CALC-SCNC: 8 MMOL/L (ref 0–18)
AST SERPL-CCNC: 14 U/L (ref 15–37)
BILIRUB SERPL-MCNC: 0.5 MG/DL (ref 0.1–2)
BUN BLD-MCNC: 15 MG/DL (ref 7–18)
BUN/CREAT SERPL: 19.2 (ref 10–20)
CALCIUM BLD-MCNC: 9.1 MG/DL (ref 8.5–10.1)
CHLORIDE SERPL-SCNC: 108 MMOL/L (ref 98–112)
CHOLEST SMN-MCNC: 114 MG/DL (ref ?–200)
CO2 SERPL-SCNC: 25 MMOL/L (ref 21–32)
CREAT BLD-MCNC: 0.78 MG/DL (ref 0.7–1.3)
EST. AVERAGE GLUCOSE BLD GHB EST-MCNC: 157 MG/DL (ref 68–126)
GLOBULIN PLAS-MCNC: 3.2 G/DL (ref 2.8–4.4)
GLUCOSE BLD-MCNC: 130 MG/DL (ref 70–99)
HBA1C MFR BLD HPLC: 7.1 % (ref ?–5.7)
HDLC SERPL-MCNC: 48 MG/DL (ref 40–59)
LDLC SERPL CALC-MCNC: 53 MG/DL (ref ?–100)
M PROTEIN MFR SERPL ELPH: 7.1 G/DL (ref 6.4–8.2)
NONHDLC SERPL-MCNC: 66 MG/DL (ref ?–130)
OSMOLALITY SERPL CALC.SUM OF ELEC: 295 MOSM/KG (ref 275–295)
PATIENT FASTING: YES
PATIENT FASTING: YES
POTASSIUM SERPL-SCNC: 3.9 MMOL/L (ref 3.5–5.1)
SODIUM SERPL-SCNC: 141 MMOL/L (ref 136–145)
TRIGL SERPL-MCNC: 66 MG/DL (ref 30–149)
VLDLC SERPL CALC-MCNC: 13 MG/DL (ref 0–30)

## 2019-07-16 PROCEDURE — 99214 OFFICE O/P EST MOD 30 MIN: CPT | Performed by: FAMILY MEDICINE

## 2019-07-16 PROCEDURE — 80061 LIPID PANEL: CPT | Performed by: FAMILY MEDICINE

## 2019-07-16 PROCEDURE — 80053 COMPREHEN METABOLIC PANEL: CPT | Performed by: FAMILY MEDICINE

## 2019-07-16 PROCEDURE — 90732 PPSV23 VACC 2 YRS+ SUBQ/IM: CPT | Performed by: FAMILY MEDICINE

## 2019-07-16 PROCEDURE — 90471 IMMUNIZATION ADMIN: CPT | Performed by: FAMILY MEDICINE

## 2019-07-16 PROCEDURE — 83036 HEMOGLOBIN GLYCOSYLATED A1C: CPT | Performed by: FAMILY MEDICINE

## 2019-07-16 RX ORDER — SIMVASTATIN 20 MG
TABLET ORAL
Qty: 90 TABLET | Refills: 3 | Status: SHIPPED | OUTPATIENT
Start: 2019-07-16 | End: 2020-07-21

## 2019-07-16 RX ORDER — LOSARTAN POTASSIUM 50 MG/1
50 TABLET ORAL DAILY
Qty: 90 TABLET | Refills: 3 | Status: SHIPPED | OUTPATIENT
Start: 2019-07-16 | End: 2020-04-02

## 2019-07-16 RX ORDER — RANITIDINE 150 MG/1
150 TABLET ORAL 2 TIMES DAILY PRN
Qty: 180 TABLET | Refills: 1 | Status: SHIPPED | OUTPATIENT
Start: 2019-07-16 | End: 2020-02-13

## 2019-07-17 NOTE — PROGRESS NOTES
CC:  Diabetes (Following up on DM. Fasting.) and Throat Problem (Difficulty swallowing. )      Hx of CC:  ROUTINE DM FOLLOW UP & FASTING LABS. DOES NOT ROUTINELY CHECK GLUCOSE AT HOME.      C/O 1-2 MONTH H/O LEFT SIDED THROAT IRRITATION/DYSPHAGIA.   NO CUR Dispense: 180 tablet; Refill: 1    3. Pharyngitis, unspecified etiology  TRIAL OF RANITIDINE TO R/O GERD AS CAUSE. IF NOT RESPONDING/BETTER, F/UP WITH ENT  - ENT - INTERNAL    4.  Need for vaccination for Strep pneumoniae    - PNEUMOCOCCAL IMM (PNEUMOVAX)

## 2019-08-05 ENCOUNTER — TELEPHONE (OUTPATIENT)
Dept: INTERNAL MEDICINE CLINIC | Facility: CLINIC | Age: 64
End: 2019-08-05

## 2019-08-05 DIAGNOSIS — E11.9 TYPE 2 DIABETES MELLITUS WITHOUT RETINOPATHY (HCC): ICD-10-CM

## 2019-08-05 DIAGNOSIS — E11.9 TYPE 2 DIABETES MELLITUS WITHOUT COMPLICATION, WITHOUT LONG-TERM CURRENT USE OF INSULIN (HCC): ICD-10-CM

## 2019-08-05 NOTE — TELEPHONE ENCOUNTER
Pt walked into the office today requesting 2 month refill of medication. States that he is going out of town on Aug 14th.

## 2019-08-05 NOTE — TELEPHONE ENCOUNTER
Contacted pt. Pt was given 3 month supply plus refills to last 1 yr. Informed pt to request refill @ pharmacy. Unsure if they will provide 3 months at the time due to insurance.   Pt states he will contact pharmacy to request refills.   -Linda Diamond

## 2019-11-05 ENCOUNTER — OFFICE VISIT (OUTPATIENT)
Dept: INTERNAL MEDICINE CLINIC | Facility: CLINIC | Age: 64
End: 2019-11-05
Payer: MEDICARE

## 2019-11-05 VITALS
OXYGEN SATURATION: 97 % | BODY MASS INDEX: 34.82 KG/M2 | DIASTOLIC BLOOD PRESSURE: 70 MMHG | HEART RATE: 69 BPM | SYSTOLIC BLOOD PRESSURE: 116 MMHG | WEIGHT: 209 LBS | HEIGHT: 65 IN

## 2019-11-05 DIAGNOSIS — J00 COMMON COLD: ICD-10-CM

## 2019-11-05 DIAGNOSIS — M62.830 SPASM OF THORACIC BACK MUSCLE: Primary | ICD-10-CM

## 2019-11-05 DIAGNOSIS — Z23 NEED FOR INFLUENZA VACCINATION: ICD-10-CM

## 2019-11-05 PROCEDURE — G0008 ADMIN INFLUENZA VIRUS VAC: HCPCS | Performed by: FAMILY MEDICINE

## 2019-11-05 PROCEDURE — 99213 OFFICE O/P EST LOW 20 MIN: CPT | Performed by: FAMILY MEDICINE

## 2019-11-05 PROCEDURE — 90686 IIV4 VACC NO PRSV 0.5 ML IM: CPT | Performed by: FAMILY MEDICINE

## 2019-11-05 RX ORDER — BACLOFEN 10 MG/1
10 TABLET ORAL 2 TIMES DAILY PRN
Qty: 50 TABLET | Refills: 0 | Status: SHIPPED | OUTPATIENT
Start: 2019-11-05 | End: 2021-09-17 | Stop reason: ALTCHOICE

## 2019-11-06 NOTE — PROGRESS NOTES
CC:  Back Pain (Pt presents to clinic for c/o back pain x 1 month. Does not recall injury. )      Hx of CC:  RIGHT MID BACK PAIN X A MONTH. WAS VERY ACTIVE WHILE IN [place]--LIFTING/PUSHING/PULLING. NO INJURY RECALLED.   SYMPTOMS BETTER BUT NOT FULLY RESOL

## 2019-11-24 DIAGNOSIS — E11.9 TYPE 2 DIABETES MELLITUS WITHOUT COMPLICATION, WITHOUT LONG-TERM CURRENT USE OF INSULIN (HCC): ICD-10-CM

## 2019-11-25 RX ORDER — SIMVASTATIN 20 MG
TABLET ORAL
Qty: 90 TABLET | Refills: 0 | Status: SHIPPED | OUTPATIENT
Start: 2019-11-25 | End: 2020-02-13

## 2020-02-13 DIAGNOSIS — E11.9 TYPE 2 DIABETES MELLITUS WITHOUT COMPLICATION, WITHOUT LONG-TERM CURRENT USE OF INSULIN (HCC): ICD-10-CM

## 2020-02-13 DIAGNOSIS — K21.9 GASTROESOPHAGEAL REFLUX DISEASE, ESOPHAGITIS PRESENCE NOT SPECIFIED: ICD-10-CM

## 2020-02-13 RX ORDER — RANITIDINE 150 MG/1
TABLET ORAL
Qty: 180 TABLET | Refills: 1 | Status: SHIPPED | OUTPATIENT
Start: 2020-02-13 | End: 2020-05-08 | Stop reason: ALTCHOICE

## 2020-02-13 RX ORDER — SIMVASTATIN 20 MG
TABLET ORAL
Qty: 90 TABLET | Refills: 0 | Status: SHIPPED | OUTPATIENT
Start: 2020-02-13 | End: 2020-04-14

## 2020-03-06 ENCOUNTER — TELEPHONE (OUTPATIENT)
Dept: INTERNAL MEDICINE CLINIC | Facility: CLINIC | Age: 65
End: 2020-03-06

## 2020-03-06 NOTE — TELEPHONE ENCOUNTER
Called pt to schedule annual MA Supervisit. No answer, No option to leave voicemal-voicemail box full.

## 2020-04-02 DIAGNOSIS — E11.9 TYPE 2 DIABETES MELLITUS WITHOUT RETINOPATHY (HCC): ICD-10-CM

## 2020-04-02 DIAGNOSIS — E11.9 TYPE 2 DIABETES MELLITUS WITHOUT COMPLICATION, WITHOUT LONG-TERM CURRENT USE OF INSULIN (HCC): ICD-10-CM

## 2020-04-02 RX ORDER — LOSARTAN POTASSIUM 50 MG/1
TABLET ORAL
Qty: 90 TABLET | Refills: 3 | Status: SHIPPED | OUTPATIENT
Start: 2020-04-02 | End: 2020-07-21

## 2020-04-04 ENCOUNTER — OFFICE VISIT (OUTPATIENT)
Dept: INTERNAL MEDICINE CLINIC | Facility: CLINIC | Age: 65
End: 2020-04-04
Payer: MEDICARE

## 2020-04-04 VITALS
HEIGHT: 65 IN | HEART RATE: 60 BPM | DIASTOLIC BLOOD PRESSURE: 72 MMHG | WEIGHT: 211.38 LBS | BODY MASS INDEX: 35.22 KG/M2 | SYSTOLIC BLOOD PRESSURE: 108 MMHG | OXYGEN SATURATION: 99 %

## 2020-04-04 DIAGNOSIS — E66.01 SEVERE OBESITY (BMI 35.0-39.9) WITH COMORBIDITY (HCC): ICD-10-CM

## 2020-04-04 DIAGNOSIS — H10.13 ALLERGIC CONJUNCTIVITIS OF BOTH EYES: ICD-10-CM

## 2020-04-04 DIAGNOSIS — E11.9 TYPE 2 DIABETES MELLITUS WITHOUT RETINOPATHY (HCC): Primary | ICD-10-CM

## 2020-04-04 DIAGNOSIS — H25.13 AGE-RELATED NUCLEAR CATARACT OF BOTH EYES: ICD-10-CM

## 2020-04-04 DIAGNOSIS — B35.6 TINEA CRURIS: ICD-10-CM

## 2020-04-04 PROCEDURE — 99214 OFFICE O/P EST MOD 30 MIN: CPT | Performed by: FAMILY MEDICINE

## 2020-04-04 RX ORDER — KETOCONAZOLE 20 MG/G
1 CREAM TOPICAL DAILY
Qty: 60 G | Refills: 1 | Status: SHIPPED | OUTPATIENT
Start: 2020-04-04

## 2020-04-04 RX ORDER — OLOPATADINE HYDROCHLORIDE 1 MG/ML
1 SOLUTION/ DROPS OPHTHALMIC 2 TIMES DAILY
Qty: 5 ML | Refills: 3 | Status: SHIPPED | OUTPATIENT
Start: 2020-04-04

## 2020-04-04 RX ORDER — FLUCONAZOLE 150 MG/1
150 TABLET ORAL ONCE
Qty: 1 TABLET | Refills: 1 | Status: SHIPPED | OUTPATIENT
Start: 2020-04-04 | End: 2020-04-04

## 2020-04-04 NOTE — PROGRESS NOTES
CC:  Rash (Patientn complains of rash on right inner thigh)      Hx of CC:  ITCHY RASH ON RIGHT INNER THIGH X PAST MONTH    ALSO F/UP ON DM.   GLUCOSE LEVELS 130'S FASTING    C/O ITCHY WATERY EYES (RECURRENT)    Vitals:    04/04/20  1134   BP: 108/72   Puls 35.0-39. 9) with comorbidity ([facility] [address]  Pt info given on exercise and healthy diet. Body mass index is 35.18 kg/m². Recommended healthy diet and aerobic exercise 30 minutes three times weekly.   - COMP METABOLIC PANEL (14)    OPTOMETRY - INTERNAL  Orders Placed

## 2020-04-13 DIAGNOSIS — E11.9 TYPE 2 DIABETES MELLITUS WITHOUT COMPLICATION, WITHOUT LONG-TERM CURRENT USE OF INSULIN (HCC): ICD-10-CM

## 2020-04-14 RX ORDER — SIMVASTATIN 20 MG
TABLET ORAL
Qty: 90 TABLET | Refills: 0 | Status: SHIPPED | OUTPATIENT
Start: 2020-04-14 | End: 2020-06-29

## 2020-05-08 ENCOUNTER — TELEPHONE (OUTPATIENT)
Dept: INTERNAL MEDICINE CLINIC | Facility: CLINIC | Age: 65
End: 2020-05-08

## 2020-05-08 DIAGNOSIS — K21.9 GASTROESOPHAGEAL REFLUX DISEASE, ESOPHAGITIS PRESENCE NOT SPECIFIED: Primary | ICD-10-CM

## 2020-05-08 RX ORDER — FAMOTIDINE 20 MG/1
20 TABLET ORAL 2 TIMES DAILY PRN
Qty: 180 TABLET | Refills: 1 | Status: SHIPPED | OUTPATIENT
Start: 2020-05-08 | End: 2020-07-21

## 2020-05-08 NOTE — TELEPHONE ENCOUNTER
Per pt states [facility] contacted him saying needs alternative to Ranitidine since may cause side effects/adversely react with another medication he is on.

## 2020-06-28 DIAGNOSIS — E11.9 TYPE 2 DIABETES MELLITUS WITHOUT COMPLICATION, WITHOUT LONG-TERM CURRENT USE OF INSULIN (HCC): ICD-10-CM

## 2020-06-29 RX ORDER — SIMVASTATIN 20 MG
TABLET ORAL
Qty: 90 TABLET | Refills: 0 | Status: SHIPPED | OUTPATIENT
Start: 2020-06-29 | End: 2020-07-21

## 2020-07-02 ENCOUNTER — OFFICE VISIT (OUTPATIENT)
Dept: OPTOMETRY | Facility: CLINIC | Age: 65
End: 2020-07-02
Payer: COMMERCIAL

## 2020-07-02 DIAGNOSIS — H52.03 HYPEROPIA WITH ASTIGMATISM AND PRESBYOPIA, BILATERAL: ICD-10-CM

## 2020-07-02 DIAGNOSIS — H52.4 HYPEROPIA WITH ASTIGMATISM AND PRESBYOPIA, BILATERAL: ICD-10-CM

## 2020-07-02 DIAGNOSIS — H25.13 AGE-RELATED NUCLEAR CATARACT OF BOTH EYES: ICD-10-CM

## 2020-07-02 DIAGNOSIS — E11.9 TYPE 2 DIABETES MELLITUS WITHOUT RETINOPATHY (HCC): Primary | ICD-10-CM

## 2020-07-02 DIAGNOSIS — H52.203 HYPEROPIA WITH ASTIGMATISM AND PRESBYOPIA, BILATERAL: ICD-10-CM

## 2020-07-02 PROCEDURE — 92015 DETERMINE REFRACTIVE STATE: CPT | Performed by: OPTOMETRIST

## 2020-07-02 PROCEDURE — 92014 COMPRE OPH EXAM EST PT 1/>: CPT | Performed by: OPTOMETRIST

## 2020-07-02 NOTE — PROGRESS NOTES
Timmy Olszewski is a 59year old male. HPI:     HPI     Diabetic Eye Exam     Diabetes characteristics include Type 2, controlled with diet and taking oral medications. Duration of 12 years. Number of years diabetic 15. Number of years on pills 12. baclofen 10 MG Oral Tab Take 1 tablet (10 mg total) by mouth 2 (two) times daily as needed (back spasm).  50 tablet 0   • simvastatin 20 MG Oral Tab TAKE 1 TABLET(20 MG) BY MOUTH EVERY NIGHT 90 tablet 3   • triamcinolone acetonide 0.1 % External Ointment Ap Nuclear sclerosis    Vitreous Vitreous floaters Vitreous floaters          Fundus Exam       Right Left    Disc Good rim, Temporal crescent Good rim, Temporal crescent    C/D Ratio 0.5 0.5    Macula Normal- no BDR Normal- no BDR    Vessels Normal Normal

## 2020-07-02 NOTE — PATIENT INSTRUCTIONS
Type [facility] mellitus [facility] retinopathy [facility]  I advised patient that there is no background diabetic retinopathy in either eye and that they should continue to keep their blood sugar under control and continue to see their physician as directed.  I st

## 2020-07-21 DIAGNOSIS — E11.9 TYPE 2 DIABETES MELLITUS WITHOUT RETINOPATHY (HCC): ICD-10-CM

## 2020-07-21 DIAGNOSIS — E11.9 TYPE 2 DIABETES MELLITUS WITHOUT COMPLICATION, WITHOUT LONG-TERM CURRENT USE OF INSULIN (HCC): ICD-10-CM

## 2020-07-21 RX ORDER — SIMVASTATIN 20 MG
20 TABLET ORAL NIGHTLY
Qty: 90 TABLET | Refills: 1 | Status: SHIPPED | OUTPATIENT
Start: 2020-07-21 | End: 2020-09-28

## 2020-07-21 RX ORDER — LANCETS
1 EACH MISCELLANEOUS DAILY
Qty: 1 BOX | Refills: 3 | Status: SHIPPED | OUTPATIENT
Start: 2020-07-21 | End: 2021-07-21

## 2020-07-21 RX ORDER — BLOOD GLUCOSE CONTROL HIGH,LOW
EACH MISCELLANEOUS
Qty: 1 EACH | Refills: 3 | Status: SHIPPED | OUTPATIENT
Start: 2020-07-21

## 2020-07-21 RX ORDER — LOSARTAN POTASSIUM 50 MG/1
50 TABLET ORAL DAILY
Qty: 90 TABLET | Refills: 1 | Status: SHIPPED | OUTPATIENT
Start: 2020-07-21 | End: 2021-01-13

## 2020-07-21 RX ORDER — FAMOTIDINE 20 MG/1
20 TABLET ORAL 2 TIMES DAILY PRN
Qty: 180 TABLET | Refills: 1 | Status: SHIPPED | OUTPATIENT
Start: 2020-07-21 | End: 2021-01-13

## 2020-07-21 RX ORDER — BLOOD SUGAR DIAGNOSTIC
1 STRIP MISCELLANEOUS DAILY
Qty: 100 STRIP | Refills: 3 | Status: SHIPPED | OUTPATIENT
Start: 2020-07-21 | End: 2021-09-22

## 2020-07-21 RX ORDER — BLOOD-GLUCOSE METER
1 EACH MISCELLANEOUS 2 TIMES DAILY
Qty: 1 KIT | Refills: 0 | Status: SHIPPED | OUTPATIENT
Start: 2020-07-21 | End: 2021-07-21

## 2020-07-21 RX ORDER — ISOPROPYL ALCOHOL 0.75 G/1
SWAB TOPICAL
Qty: 100 EACH | Refills: 3 | Status: SHIPPED | OUTPATIENT
Start: 2020-07-21

## 2020-08-10 ENCOUNTER — OFFICE VISIT (OUTPATIENT)
Dept: INTERNAL MEDICINE CLINIC | Facility: CLINIC | Age: 65
End: 2020-08-10
Payer: MEDICARE

## 2020-08-10 VITALS
HEART RATE: 59 BPM | WEIGHT: 206 LBS | BODY MASS INDEX: 34.32 KG/M2 | DIASTOLIC BLOOD PRESSURE: 64 MMHG | HEIGHT: 65 IN | OXYGEN SATURATION: 98 % | SYSTOLIC BLOOD PRESSURE: 110 MMHG

## 2020-08-10 DIAGNOSIS — Z87.891 HISTORY OF TOBACCO ABUSE: ICD-10-CM

## 2020-08-10 DIAGNOSIS — Z86.008 HISTORY OF CARCINOMA IN SITU OF BLADDER: ICD-10-CM

## 2020-08-10 DIAGNOSIS — Z23 NEED FOR SHINGLES VACCINE: ICD-10-CM

## 2020-08-10 DIAGNOSIS — N52.9 ERECTILE DYSFUNCTION, UNSPECIFIED ERECTILE DYSFUNCTION TYPE: ICD-10-CM

## 2020-08-10 DIAGNOSIS — Z12.5 PROSTATE CANCER SCREENING: ICD-10-CM

## 2020-08-10 DIAGNOSIS — E11.9 TYPE 2 DIABETES MELLITUS WITHOUT COMPLICATION, WITHOUT LONG-TERM CURRENT USE OF INSULIN (HCC): ICD-10-CM

## 2020-08-10 DIAGNOSIS — K21.9 GASTROESOPHAGEAL REFLUX DISEASE, ESOPHAGITIS PRESENCE NOT SPECIFIED: ICD-10-CM

## 2020-08-10 DIAGNOSIS — Z00.00 ANNUAL PHYSICAL EXAM: Primary | ICD-10-CM

## 2020-08-10 PROCEDURE — 90471 IMMUNIZATION ADMIN: CPT | Performed by: FAMILY MEDICINE

## 2020-08-10 PROCEDURE — 3078F DIAST BP <80 MM HG: CPT | Performed by: FAMILY MEDICINE

## 2020-08-10 PROCEDURE — 99397 PER PM REEVAL EST PAT 65+ YR: CPT | Performed by: FAMILY MEDICINE

## 2020-08-10 PROCEDURE — G0402 INITIAL PREVENTIVE EXAM: HCPCS | Performed by: FAMILY MEDICINE

## 2020-08-10 PROCEDURE — 3008F BODY MASS INDEX DOCD: CPT | Performed by: FAMILY MEDICINE

## 2020-08-10 PROCEDURE — 96160 PT-FOCUSED HLTH RISK ASSMT: CPT | Performed by: FAMILY MEDICINE

## 2020-08-10 PROCEDURE — 3074F SYST BP LT 130 MM HG: CPT | Performed by: FAMILY MEDICINE

## 2020-08-10 PROCEDURE — 90750 HZV VACC RECOMBINANT IM: CPT | Performed by: FAMILY MEDICINE

## 2020-08-10 RX ORDER — SILDENAFIL 100 MG/1
100 TABLET, FILM COATED ORAL
Qty: 20 TABLET | Refills: 3 | Status: SHIPPED | OUTPATIENT
Start: 2020-08-10 | End: 2022-01-21

## 2020-08-10 NOTE — PROGRESS NOTES
HPI:   Garret Dowling is a 72year old male who presents for a MA Supervisit.     Patient Active Problem List:     Class 1 obesity due to excess calories with serious comorbidity and body mass index (BMI) of 32.0 to 16.5 in adult     Lichen planus     H walking?: No    Difficulty dressing or bathing?: No    Problems with daily activities? : No    Memory Problems?: No      Fall/Risk Assessment          Have you fallen in the last 12 months?: 0-No    Fall/Risk Scorin          Depression Screening (PHQ-2 (two) times daily. 1 kit 0   • Accu-Chek Softclix Lancets Does not apply Misc 1 lancet by Finger stick route daily. Use as directed.  1 Box 3   • Alcohol Swabs (BD SWAB SINGLE USE REGULAR) Does not apply Pads Use one daily 100 each 3   • Blood Glucose Calib denies hx of anemia  ENDOCRINE: denies thyroid history.   GLUCOSE LEVELS IN -125 F RANGE  ALL/ASTHMA: denies hx of allergy or asthma    EXAM:   /64 (BP Location: Right arm, Patient Position: Sitting, Cuff Size: adult)   Pulse 59   Ht 65\"   Wt without complication, without long-term current use of insulin ([facility] [address]):  FAIR CONTROL ON METFORMIN  -     COMP METABOLIC PANEL (14)  -     HEMOGLOBIN A1C  -     LIPID PANEL    Prostate cancer screening  -     PSA, DIAGNOSTIC    History of carcinoma in situ o Pneumococcal Orders placed or performed in visit on 07/16/19   • PNEUMOCOCCAL IMM (PNEUMOVAX)    Update Immunization Activity if applicable    Hepatitis B No orders found for this or any previous visit.  Update Immunization Activity if applicable    Tetanus

## 2020-08-11 LAB
ALBUMIN/GLOBULIN RATIO: 2 (CALC) (ref 1–2.5)
ALBUMIN: 4.6 G/DL (ref 3.6–5.1)
ALKALINE PHOSPHATASE: 52 U/L (ref 35–144)
ALT: 17 U/L (ref 9–46)
AST: 15 U/L (ref 10–35)
BILIRUBIN, TOTAL: 0.5 MG/DL (ref 0.2–1.2)
BUN: 13 MG/DL (ref 7–25)
CALCIUM: 9.3 MG/DL (ref 8.6–10.3)
CARBON DIOXIDE: 25 MMOL/L (ref 20–32)
CHLORIDE: 106 MMOL/L (ref 98–110)
CHOL/HDLC RATIO: 2.3 (CALC)
CHOLESTEROL, TOTAL: 119 MG/DL
CREATININE: 0.81 MG/DL (ref 0.7–1.25)
EGFR IF AFRICN AM: 108 ML/MIN/1.73M2
EGFR IF NONAFRICN AM: 93 ML/MIN/1.73M2
GLOBULIN: 2.3 G/DL (CALC) (ref 1.9–3.7)
GLUCOSE: 114 MG/DL (ref 65–99)
HDL CHOLESTEROL: 51 MG/DL
HEMOGLOBIN A1C: 6.1 % OF TOTAL HGB
LDL-CHOLESTEROL: 51 MG/DL (CALC)
NON-HDL CHOLESTEROL: 68 MG/DL (CALC)
POTASSIUM: 4 MMOL/L (ref 3.5–5.3)
PROTEIN, TOTAL: 6.9 G/DL (ref 6.1–8.1)
PSA, TOTAL: 0.4 NG/ML
SODIUM: 138 MMOL/L (ref 135–146)
TRIGLYCERIDES: 89 MG/DL

## 2020-09-26 DIAGNOSIS — E11.9 TYPE 2 DIABETES MELLITUS WITHOUT COMPLICATION, WITHOUT LONG-TERM CURRENT USE OF INSULIN (HCC): ICD-10-CM

## 2020-09-28 RX ORDER — SIMVASTATIN 20 MG
TABLET ORAL
Qty: 90 TABLET | Refills: 1 | Status: SHIPPED | OUTPATIENT
Start: 2020-09-28 | End: 2021-01-13

## 2021-01-13 ENCOUNTER — OFFICE VISIT (OUTPATIENT)
Dept: INTERNAL MEDICINE CLINIC | Facility: CLINIC | Age: 66
End: 2021-01-13
Payer: MEDICARE

## 2021-01-13 VITALS
HEART RATE: 74 BPM | OXYGEN SATURATION: 99 % | WEIGHT: 208 LBS | SYSTOLIC BLOOD PRESSURE: 120 MMHG | BODY MASS INDEX: 34.66 KG/M2 | DIASTOLIC BLOOD PRESSURE: 72 MMHG | HEIGHT: 65 IN

## 2021-01-13 DIAGNOSIS — E66.01 SEVERE OBESITY (BMI 35.0-39.9) WITH COMORBIDITY (HCC): ICD-10-CM

## 2021-01-13 DIAGNOSIS — E11.9 TYPE 2 DIABETES MELLITUS WITHOUT RETINOPATHY (HCC): ICD-10-CM

## 2021-01-13 DIAGNOSIS — E11.9 TYPE 2 DIABETES MELLITUS WITHOUT COMPLICATION, WITHOUT LONG-TERM CURRENT USE OF INSULIN (HCC): ICD-10-CM

## 2021-01-13 DIAGNOSIS — M25.50 ARTHRALGIA, UNSPECIFIED JOINT: Primary | ICD-10-CM

## 2021-01-13 DIAGNOSIS — Z23 NEED FOR VACCINATION: ICD-10-CM

## 2021-01-13 PROCEDURE — 99214 OFFICE O/P EST MOD 30 MIN: CPT | Performed by: FAMILY MEDICINE

## 2021-01-13 PROCEDURE — 3074F SYST BP LT 130 MM HG: CPT | Performed by: FAMILY MEDICINE

## 2021-01-13 PROCEDURE — 3008F BODY MASS INDEX DOCD: CPT | Performed by: FAMILY MEDICINE

## 2021-01-13 PROCEDURE — 3078F DIAST BP <80 MM HG: CPT | Performed by: FAMILY MEDICINE

## 2021-01-13 PROCEDURE — 3061F NEG MICROALBUMINURIA REV: CPT | Performed by: FAMILY MEDICINE

## 2021-01-13 RX ORDER — FAMOTIDINE 20 MG/1
20 TABLET ORAL 2 TIMES DAILY PRN
Qty: 180 TABLET | Refills: 1 | Status: SHIPPED | OUTPATIENT
Start: 2021-01-13 | End: 2021-06-09

## 2021-01-13 RX ORDER — ZOSTER VACCINE RECOMBINANT, ADJUVANTED 50 MCG/0.5
1 KIT INTRAMUSCULAR ONCE
Qty: 1 EACH | Refills: 0 | Status: SHIPPED | OUTPATIENT
Start: 2021-01-13 | End: 2021-01-13

## 2021-01-13 RX ORDER — LOSARTAN POTASSIUM 50 MG/1
50 TABLET ORAL DAILY
Qty: 90 TABLET | Refills: 1 | Status: SHIPPED | OUTPATIENT
Start: 2021-01-13 | End: 2021-09-14

## 2021-01-13 RX ORDER — SIMVASTATIN 20 MG
20 TABLET ORAL NIGHTLY
Qty: 90 TABLET | Refills: 3 | Status: SHIPPED | OUTPATIENT
Start: 2021-01-13

## 2021-01-13 NOTE — PROGRESS NOTES
PATIENT IDENTIFICATION  Name: Leanna Ramey  MRN: [id number]    Diagnoses:   Arthralgia, unspecified joint  (primary encounter diagnosis)  Type 2 diabetes mellitus without complication, without long-term current use of insulin (HCC)  Type 2 diabetes shellie losartan Potassium 50 MG Oral Tab Take 1 tablet (50 mg total) by mouth daily. 90 tablet 1   • Sildenafil Citrate 100 MG Oral Tab Take 1 tablet (100 mg total) by mouth daily as needed for Erectile Dysfunction.  20 tablet 3   • Glucose Blood (ACCU-CHEK REJI and hypertension follow-up      1. Type 2 diabetes mellitus without complication, without long-term current use of insulin (HCC)  - simvastatin 20 MG Oral Tab; Take 1 tablet (20 mg total) by mouth nightly. Dispense: 90 tablet;  Refill: 3  - metFORMIN HCl 1

## 2021-01-14 LAB
CREATININE, RANDOM URINE: 186 MG/DL (ref 20–320)
HEMOGLOBIN A1C: 6.3 % OF TOTAL HGB
MICROALBUMIN/CREATININE RATIO, RANDOM URINE: 4 MCG/MG CREAT
MICROALBUMIN: 0.8 MG/DL

## 2021-02-03 DIAGNOSIS — Z23 NEED FOR VACCINATION: ICD-10-CM

## 2021-02-25 ENCOUNTER — TELEPHONE (OUTPATIENT)
Dept: INTERNAL MEDICINE CLINIC | Facility: CLINIC | Age: 66
End: 2021-02-25

## 2021-02-25 ENCOUNTER — LAB ENCOUNTER (OUTPATIENT)
Dept: LAB | Age: 66
End: 2021-02-25
Attending: FAMILY MEDICINE
Payer: MEDICARE

## 2021-02-25 DIAGNOSIS — Z20.822 EXPOSURE TO COVID-19 VIRUS: ICD-10-CM

## 2021-02-25 DIAGNOSIS — Z20.822 EXPOSURE TO COVID-19 VIRUS: Primary | ICD-10-CM

## 2021-02-25 NOTE — TELEPHONE ENCOUNTER
Patients wife had asymptomatic positive covid test yesterday (was scheduled for surgery but was cancelled) wants to get tested for covid since he has his second covid vaccine coming up on 3/2.

## 2021-02-26 LAB — SARS-COV-2 RNA RESP QL NAA+PROBE: NOT DETECTED

## 2021-03-24 ENCOUNTER — TELEPHONE (OUTPATIENT)
Dept: CASE MANAGEMENT | Age: 66
End: 2021-03-24

## 2021-03-24 NOTE — TELEPHONE ENCOUNTER
Pt is eligible for 2021 Medicare Advantage Supervisit, using  [employer] List #[id number] message left for pt to call office [phone] to schedule.

## 2021-06-09 ENCOUNTER — OFFICE VISIT (OUTPATIENT)
Dept: INTERNAL MEDICINE CLINIC | Facility: CLINIC | Age: 66
End: 2021-06-09
Payer: MEDICARE

## 2021-06-09 VITALS
HEART RATE: 76 BPM | DIASTOLIC BLOOD PRESSURE: 68 MMHG | WEIGHT: 204 LBS | OXYGEN SATURATION: 98 % | HEIGHT: 65 IN | SYSTOLIC BLOOD PRESSURE: 112 MMHG | BODY MASS INDEX: 33.99 KG/M2

## 2021-06-09 DIAGNOSIS — M79.672 LEFT FOOT PAIN: ICD-10-CM

## 2021-06-09 DIAGNOSIS — E11.9 TYPE 2 DIABETES MELLITUS WITHOUT COMPLICATION, WITHOUT LONG-TERM CURRENT USE OF INSULIN (HCC): Primary | ICD-10-CM

## 2021-06-09 PROCEDURE — 99213 OFFICE O/P EST LOW 20 MIN: CPT | Performed by: FAMILY MEDICINE

## 2021-06-09 PROCEDURE — 3008F BODY MASS INDEX DOCD: CPT | Performed by: FAMILY MEDICINE

## 2021-06-09 PROCEDURE — 3074F SYST BP LT 130 MM HG: CPT | Performed by: FAMILY MEDICINE

## 2021-06-09 PROCEDURE — 3044F HG A1C LEVEL LT 7.0%: CPT | Performed by: FAMILY MEDICINE

## 2021-06-09 PROCEDURE — 3078F DIAST BP <80 MM HG: CPT | Performed by: FAMILY MEDICINE

## 2021-06-09 RX ORDER — AMMONIUM LACTATE 12 G/100G
1 CREAM TOPICAL AS NEEDED
Qty: 385 G | Refills: 0 | Status: SHIPPED | OUTPATIENT
Start: 2021-06-09

## 2021-06-09 NOTE — PROGRESS NOTES
PATIENT IDENTIFICATION  Name: Elida Ricketts  MRN: [id number]    Diagnoses:   Type 2 diabetes mellitus without complication, without long-term current use of insulin ([facility]  (primary encounter diagnosis)  Left foot pain    SUBJECTIVE:  Elida Ricketts is into the skin daily. Use as directed. 100 strip 3   • Blood Glucose Monitoring Suppl (ACCU-CHEK REJI PLUS) w/Device Does not apply Kit 1 Device by Other route 2 (two) times daily.  1 kit 0   • Accu-Chek Softclix Lancets Does not apply Misc 1 lancet by Fing

## 2021-07-02 ENCOUNTER — TELEPHONE (OUTPATIENT)
Dept: INTERNAL MEDICINE CLINIC | Facility: CLINIC | Age: 66
End: 2021-07-02

## 2021-07-12 ENCOUNTER — OFFICE VISIT (OUTPATIENT)
Dept: INTERNAL MEDICINE CLINIC | Facility: CLINIC | Age: 66
End: 2021-07-12
Payer: MEDICARE

## 2021-07-12 VITALS
RESPIRATION RATE: 15 BRPM | BODY MASS INDEX: 34.32 KG/M2 | HEIGHT: 65 IN | OXYGEN SATURATION: 98 % | SYSTOLIC BLOOD PRESSURE: 104 MMHG | DIASTOLIC BLOOD PRESSURE: 64 MMHG | WEIGHT: 206 LBS | HEART RATE: 76 BPM

## 2021-07-12 DIAGNOSIS — H52.203 HYPEROPIA WITH ASTIGMATISM AND PRESBYOPIA, BILATERAL: ICD-10-CM

## 2021-07-12 DIAGNOSIS — E66.09 CLASS 1 OBESITY DUE TO EXCESS CALORIES WITH SERIOUS COMORBIDITY AND BODY MASS INDEX (BMI) OF 34.0 TO 34.9 IN ADULT: ICD-10-CM

## 2021-07-12 DIAGNOSIS — H52.03 HYPEROPIA WITH ASTIGMATISM AND PRESBYOPIA, BILATERAL: ICD-10-CM

## 2021-07-12 DIAGNOSIS — N52.9 ERECTILE DYSFUNCTION, UNSPECIFIED ERECTILE DYSFUNCTION TYPE: ICD-10-CM

## 2021-07-12 DIAGNOSIS — Z86.008 HISTORY OF CARCINOMA IN SITU OF BLADDER: ICD-10-CM

## 2021-07-12 DIAGNOSIS — L43.9 LICHEN PLANUS: ICD-10-CM

## 2021-07-12 DIAGNOSIS — H52.4 HYPEROPIA WITH ASTIGMATISM AND PRESBYOPIA, BILATERAL: ICD-10-CM

## 2021-07-12 DIAGNOSIS — E11.9 TYPE 2 DIABETES MELLITUS WITHOUT COMPLICATION, WITHOUT LONG-TERM CURRENT USE OF INSULIN (HCC): ICD-10-CM

## 2021-07-12 DIAGNOSIS — Z00.00 ANNUAL PHYSICAL EXAM: Primary | ICD-10-CM

## 2021-07-12 DIAGNOSIS — H43.393 FLOATER, VITREOUS, BILATERAL: ICD-10-CM

## 2021-07-12 DIAGNOSIS — H10.13 ALLERGIC CONJUNCTIVITIS OF BOTH EYES: ICD-10-CM

## 2021-07-12 DIAGNOSIS — Z12.5 SCREENING FOR PROSTATE CANCER: ICD-10-CM

## 2021-07-12 DIAGNOSIS — K21.9 GASTROESOPHAGEAL REFLUX DISEASE, UNSPECIFIED WHETHER ESOPHAGITIS PRESENT: ICD-10-CM

## 2021-07-12 DIAGNOSIS — H25.13 AGE-RELATED NUCLEAR CATARACT OF BOTH EYES: ICD-10-CM

## 2021-07-12 PROCEDURE — 3008F BODY MASS INDEX DOCD: CPT | Performed by: FAMILY MEDICINE

## 2021-07-12 PROCEDURE — 99397 PER PM REEVAL EST PAT 65+ YR: CPT | Performed by: FAMILY MEDICINE

## 2021-07-12 PROCEDURE — 3074F SYST BP LT 130 MM HG: CPT | Performed by: FAMILY MEDICINE

## 2021-07-12 PROCEDURE — 3078F DIAST BP <80 MM HG: CPT | Performed by: FAMILY MEDICINE

## 2021-07-12 PROCEDURE — G0438 PPPS, INITIAL VISIT: HCPCS | Performed by: FAMILY MEDICINE

## 2021-07-12 PROCEDURE — 96160 PT-FOCUSED HLTH RISK ASSMT: CPT | Performed by: FAMILY MEDICINE

## 2021-07-12 NOTE — PROGRESS NOTES
HPI:   Sandy Syed is a 72year old male who presents for a Medicare Subsequent Annual Wellness visit (Pt already had Initial Annual Wellness).   No topic due editable text      When asked about why he thinks he has memory issues, patient stated at reji PCP - General (Family Medicine)    Patient Active Problem List:     Class 1 obesity due to excess calories with serious comorbidity and body mass index (BMI) of 34.0 to 96.2 in adult     Lichen planus     History of carcinoma in situ of bladder     Type 2 Does not apply Misc, 1 lancet by Finger stick route daily. Use as directed.   Alcohol Swabs (BD SWAB SINGLE USE REGULAR) Does not apply Pads, Use one daily  Blood Glucose Calibration (ACCU-CHEK REJI) In Vitro Solution, Calibrate glucometer as indicated  ke Negative for suicidal ideas. The patient is not nervous/anxious.         EXAM:   /64 (BP Location: Right arm, Patient Position: Sitting, Cuff Size: adult)   Pulse 76   Resp 15   Ht 5' 5\" (1.651 m)   Wt 206 lb (93.4 kg)   SpO2 98%   BMI 34.28 kg/m² Pneumovax 23 07/16/2019   • TDAP 01/31/2015   • Zoster Vaccine Recombinant Adjuvanted (Shingrix) 08/10/2020, 01/13/2021        ASSESSMENT AND OTHER RELEVANT CHRONIC CONDITIONS:   Elida Ricketts is a 72year old male who presents for a Medicare Assessment SCHEDULE   Tests on this list are recommended by your physician but may not be covered, or covered at this frequency, by your insurer. Please check with your insurance carrier before scheduling to verify coverage.    PREVENTATIVE SERVICES FREQUENCY &  COV & [id number]) covered once after 65 Prevnar 13: 10/13/2020    [id number]: 07/16/2019     No recommendations at this time    Hepatitis B One screening covered for patients with certain risk factors   03/18/2016  No recommendations at this time    Tetanus

## 2021-07-12 NOTE — PATIENT INSTRUCTIONS
Bhargav Love's SCREENING SCHEDULE   Tests on this list are recommended by your physician but may not be covered, or covered at this frequency, by your insurer. Please check with your insurance carrier before scheduling to verify coverage.    PREVENT Pneumococcal Each vaccine (Tnbrioh43 & Zadvdnahr37) covered once after 65 Prevnar 13: 10/13/2020    [id number]: 07/16/2019     No recommendations at this time    Hepatitis B One screening covered for patients with certain risk factors   03/18/2016  No rec the different types of Advance Directives. It also has the State forms available on it's website for anyone to review and print using their home computer and printer. (the forms are also available in [place]  [email or web address] [email or web address]. org  This link also has inf

## 2021-07-13 LAB
% FREE PSA: 25 % (CALC)
ALBUMIN/GLOBULIN RATIO: 1.9 (CALC) (ref 1–2.5)
ALBUMIN: 4.5 G/DL (ref 3.6–5.1)
ALKALINE PHOSPHATASE: 54 U/L (ref 35–144)
ALT: 18 U/L (ref 9–46)
AST: 18 U/L (ref 10–35)
BILIRUBIN, TOTAL: 0.6 MG/DL (ref 0.2–1.2)
BUN: 12 MG/DL (ref 7–25)
CALCIUM: 9.2 MG/DL (ref 8.6–10.3)
CARBON DIOXIDE: 25 MMOL/L (ref 20–32)
CHLORIDE: 105 MMOL/L (ref 98–110)
CHOL/HDLC RATIO: 2.5 (CALC)
CHOLESTEROL, TOTAL: 142 MG/DL
CREATININE: 0.73 MG/DL (ref 0.7–1.25)
EGFR IF AFRICN AM: 113 ML/MIN/1.73M2
EGFR IF NONAFRICN AM: 97 ML/MIN/1.73M2
FREE PSA: 0.1 NG/ML
GLOBULIN: 2.4 G/DL (CALC) (ref 1.9–3.7)
GLUCOSE: 107 MG/DL (ref 65–99)
HDL CHOLESTEROL: 56 MG/DL
LDL-CHOLESTEROL: 68 MG/DL (CALC)
NON-HDL CHOLESTEROL: 86 MG/DL (CALC)
POTASSIUM: 4.1 MMOL/L (ref 3.5–5.3)
PROTEIN, TOTAL: 6.9 G/DL (ref 6.1–8.1)
SODIUM: 140 MMOL/L (ref 135–146)
TOTAL PSA: 0.4 NG/ML
TRIGLYCERIDES: 95 MG/DL

## 2021-07-15 PROBLEM — E66.01 SEVERE OBESITY (BMI 35.0-39.9) WITH COMORBIDITY (HCC): Chronic | Status: RESOLVED | Noted: 2020-04-04 | Resolved: 2021-07-15

## 2021-07-15 PROBLEM — Z98.890 HISTORY OF OPEN REDUCTION AND INTERNAL FIXATION (ORIF) PROCEDURE: Status: RESOLVED | Noted: 2018-01-19 | Resolved: 2021-07-15

## 2021-07-15 PROBLEM — E66.811 CLASS 1 OBESITY DUE TO EXCESS CALORIES WITH SERIOUS COMORBIDITY AND BODY MASS INDEX (BMI) OF 34.0 TO 34.9 IN ADULT: Status: ACTIVE | Noted: 2018-01-19

## 2021-07-15 PROBLEM — E66.09 CLASS 1 OBESITY DUE TO EXCESS CALORIES WITH SERIOUS COMORBIDITY AND BODY MASS INDEX (BMI) OF 34.0 TO 34.9 IN ADULT: Status: ACTIVE | Noted: 2018-01-19

## 2021-07-15 PROBLEM — Z12.5 SCREENING FOR PROSTATE CANCER: Status: ACTIVE | Noted: 2021-07-15

## 2021-07-16 PROBLEM — G97.82: Status: RESOLVED | Noted: 2018-06-12 | Resolved: 2021-07-16

## 2021-07-16 PROBLEM — Z87.891 PERSONAL HISTORY OF TOBACCO USE: Status: RESOLVED | Noted: 2018-01-19 | Resolved: 2021-07-16

## 2021-07-16 PROBLEM — G62.9: Status: RESOLVED | Noted: 2018-06-12 | Resolved: 2021-07-16

## 2021-09-14 ENCOUNTER — OFFICE VISIT (OUTPATIENT)
Dept: INTERNAL MEDICINE CLINIC | Facility: CLINIC | Age: 66
End: 2021-09-14
Payer: MEDICARE

## 2021-09-14 VITALS
OXYGEN SATURATION: 98 % | RESPIRATION RATE: 17 BRPM | WEIGHT: 206 LBS | HEART RATE: 69 BPM | BODY MASS INDEX: 34.32 KG/M2 | HEIGHT: 65 IN | DIASTOLIC BLOOD PRESSURE: 64 MMHG | SYSTOLIC BLOOD PRESSURE: 112 MMHG

## 2021-09-14 DIAGNOSIS — E11.9 TYPE 2 DIABETES MELLITUS WITHOUT COMPLICATION, WITHOUT LONG-TERM CURRENT USE OF INSULIN (HCC): ICD-10-CM

## 2021-09-14 PROCEDURE — 3044F HG A1C LEVEL LT 7.0%: CPT | Performed by: FAMILY MEDICINE

## 2021-09-14 PROCEDURE — 99213 OFFICE O/P EST LOW 20 MIN: CPT | Performed by: FAMILY MEDICINE

## 2021-09-14 PROCEDURE — 3008F BODY MASS INDEX DOCD: CPT | Performed by: FAMILY MEDICINE

## 2021-09-14 PROCEDURE — 3078F DIAST BP <80 MM HG: CPT | Performed by: FAMILY MEDICINE

## 2021-09-14 PROCEDURE — 3074F SYST BP LT 130 MM HG: CPT | Performed by: FAMILY MEDICINE

## 2021-09-14 RX ORDER — LOSARTAN POTASSIUM 50 MG/1
50 TABLET ORAL DAILY
Qty: 90 TABLET | Refills: 1 | Status: SHIPPED | OUTPATIENT
Start: 2021-09-14 | End: 2022-01-21

## 2021-09-15 LAB — HEMOGLOBIN A1C: 6.2 % OF TOTAL HGB

## 2021-09-17 NOTE — PROGRESS NOTES
PATIENT IDENTIFICATION  Name: Jess Arreaga  MRN: [id number]    Diagnoses:   Type 2 diabetes mellitus without complication, without long-term current use of insulin ([facility]    SUBJECTIVE:  Jess Arreaga is a 77year old male who presents for DM f/u vis directed.  100 strip 3   • Alcohol Swabs (BD SWAB SINGLE USE REGULAR) Does not apply Pads Use one daily 100 each 3   • Blood Glucose Calibration (ACCU-CHEK REJI) In Vitro Solution Calibrate glucometer as indicated 1 each 3   • ketoconazole 2 % External Cre

## 2021-09-22 RX ORDER — BLOOD SUGAR DIAGNOSTIC
1 STRIP MISCELLANEOUS DAILY
Qty: 100 STRIP | Refills: 3 | Status: SHIPPED | OUTPATIENT
Start: 2021-09-22

## 2021-09-22 RX ORDER — GLUCOSAM/CHON-MSM1/C/MANG/BOSW 500-416.6
1 TABLET ORAL DAILY
Qty: 100 EACH | Refills: 3 | Status: SHIPPED | OUTPATIENT
Start: 2021-09-22 | End: 2022-09-22

## 2021-11-15 ENCOUNTER — TELEPHONE (OUTPATIENT)
Dept: INTERNAL MEDICINE CLINIC | Facility: CLINIC | Age: 66
End: 2021-11-15

## 2022-01-21 ENCOUNTER — OFFICE VISIT (OUTPATIENT)
Dept: INTERNAL MEDICINE CLINIC | Facility: CLINIC | Age: 67
End: 2022-01-21
Payer: MEDICARE

## 2022-01-21 VITALS
WEIGHT: 212 LBS | HEIGHT: 65 IN | BODY MASS INDEX: 35.32 KG/M2 | DIASTOLIC BLOOD PRESSURE: 76 MMHG | HEART RATE: 86 BPM | OXYGEN SATURATION: 98 % | SYSTOLIC BLOOD PRESSURE: 124 MMHG

## 2022-01-21 DIAGNOSIS — Z23 NEED FOR INFLUENZA VACCINATION: ICD-10-CM

## 2022-01-21 DIAGNOSIS — E11.9 TYPE 2 DIABETES MELLITUS WITHOUT COMPLICATION, WITHOUT LONG-TERM CURRENT USE OF INSULIN (HCC): ICD-10-CM

## 2022-01-21 DIAGNOSIS — I10 ESSENTIAL HYPERTENSION: Primary | ICD-10-CM

## 2022-01-21 DIAGNOSIS — N52.9 ERECTILE DYSFUNCTION, UNSPECIFIED ERECTILE DYSFUNCTION TYPE: ICD-10-CM

## 2022-01-21 PROCEDURE — 3074F SYST BP LT 130 MM HG: CPT | Performed by: FAMILY MEDICINE

## 2022-01-21 PROCEDURE — 3078F DIAST BP <80 MM HG: CPT | Performed by: FAMILY MEDICINE

## 2022-01-21 PROCEDURE — 99214 OFFICE O/P EST MOD 30 MIN: CPT | Performed by: FAMILY MEDICINE

## 2022-01-21 PROCEDURE — G0008 ADMIN INFLUENZA VIRUS VAC: HCPCS | Performed by: FAMILY MEDICINE

## 2022-01-21 PROCEDURE — 3051F HG A1C>EQUAL 7.0%<8.0%: CPT | Performed by: FAMILY MEDICINE

## 2022-01-21 PROCEDURE — 3008F BODY MASS INDEX DOCD: CPT | Performed by: FAMILY MEDICINE

## 2022-01-21 PROCEDURE — 3061F NEG MICROALBUMINURIA REV: CPT | Performed by: FAMILY MEDICINE

## 2022-01-21 PROCEDURE — 90662 IIV NO PRSV INCREASED AG IM: CPT | Performed by: FAMILY MEDICINE

## 2022-01-21 RX ORDER — LOSARTAN POTASSIUM 25 MG/1
25 TABLET ORAL DAILY
Qty: 90 TABLET | Refills: 1 | Status: SHIPPED | OUTPATIENT
Start: 2022-01-21

## 2022-01-21 RX ORDER — SILDENAFIL 100 MG/1
100 TABLET, FILM COATED ORAL
Qty: 20 TABLET | Refills: 1 | Status: SHIPPED | OUTPATIENT
Start: 2022-01-21

## 2022-01-22 LAB
CREATININE, RANDOM URINE: 189 MG/DL (ref 20–320)
HEMOGLOBIN A1C: 7.6 % OF TOTAL HGB
MICROALBUMIN/CREATININE RATIO, RANDOM URINE: 4 MCG/MG CREAT
MICROALBUMIN: 0.8 MG/DL

## 2022-01-24 NOTE — PROGRESS NOTES
PATIENT IDENTIFICATION  Name: Cruzito Card  MRN: [id number]    Diagnoses:   Essential hypertension  (primary encounter diagnosis)  Type 2 diabetes mellitus without complication, without long-term current use of insulin (HCC)  Erectile dysfunction, unsp • metFORMIN HCl 1000 MG Oral Tab TAKE 1 TABLET BY MOUTH TWICE DAILY WITH MEALS 180 tablet 2   • Blood Glucose Monitoring Suppl (TRUE METRIX METER) w/Device Does not apply Kit      • Glucose Blood (ACCU-CHEK REJI PLUS) In Vitro Strip Inject 1 Device into mg total) by mouth daily. Dispense: 90 tablet; Refill: 1    3. Erectile dysfunction, unspecified erectile dysfunction type  - Sildenafil Citrate 100 MG Oral Tab; Take 1 tablet (100 mg total) by mouth daily as needed for Erectile Dysfunction.   Dispense: 20

## 2022-02-08 RX ORDER — SIMVASTATIN 20 MG
TABLET ORAL
Qty: 90 TABLET | Refills: 3 | Status: SHIPPED | OUTPATIENT
Start: 2022-02-08

## 2022-02-11 ENCOUNTER — TELEPHONE (OUTPATIENT)
Dept: INTERNAL MEDICINE CLINIC | Facility: CLINIC | Age: 67
End: 2022-02-11

## 2022-03-08 RX ORDER — LOSARTAN POTASSIUM 25 MG/1
25 TABLET ORAL DAILY
Qty: 90 TABLET | Refills: 1 | Status: SHIPPED | OUTPATIENT
Start: 2022-03-08

## 2022-05-09 ENCOUNTER — OFFICE VISIT (OUTPATIENT)
Dept: INTERNAL MEDICINE CLINIC | Facility: CLINIC | Age: 67
End: 2022-05-09
Payer: MEDICARE

## 2022-05-09 VITALS
DIASTOLIC BLOOD PRESSURE: 74 MMHG | WEIGHT: 193 LBS | HEIGHT: 65 IN | BODY MASS INDEX: 32.15 KG/M2 | SYSTOLIC BLOOD PRESSURE: 118 MMHG | RESPIRATION RATE: 14 BRPM | OXYGEN SATURATION: 99 % | HEART RATE: 69 BPM

## 2022-05-09 DIAGNOSIS — E11.9 TYPE 2 DIABETES MELLITUS WITHOUT COMPLICATION, WITHOUT LONG-TERM CURRENT USE OF INSULIN (HCC): Primary | ICD-10-CM

## 2022-05-09 DIAGNOSIS — I10 ESSENTIAL HYPERTENSION: ICD-10-CM

## 2022-05-09 DIAGNOSIS — E78.2 MIXED HYPERLIPIDEMIA: ICD-10-CM

## 2022-05-09 PROBLEM — E11.65 TYPE 2 DIABETES MELLITUS WITH HYPERGLYCEMIA (HCC): Status: ACTIVE | Noted: 2022-05-09

## 2022-05-09 PROBLEM — E11.40 TYPE 2 DIABETES MELLITUS WITH DIABETIC NEUROPATHY (HCC): Status: ACTIVE | Noted: 2022-05-09

## 2022-05-09 LAB
CARTRIDGE LOT#: ABNORMAL NUMERIC
HEMOGLOBIN A1C: 12.8 % (ref 4.3–5.6)

## 2022-05-09 PROCEDURE — 3078F DIAST BP <80 MM HG: CPT | Performed by: FAMILY MEDICINE

## 2022-05-09 PROCEDURE — 99214 OFFICE O/P EST MOD 30 MIN: CPT | Performed by: FAMILY MEDICINE

## 2022-05-09 PROCEDURE — 3074F SYST BP LT 130 MM HG: CPT | Performed by: FAMILY MEDICINE

## 2022-05-09 PROCEDURE — 3046F HEMOGLOBIN A1C LEVEL >9.0%: CPT | Performed by: FAMILY MEDICINE

## 2022-05-09 PROCEDURE — 3008F BODY MASS INDEX DOCD: CPT | Performed by: FAMILY MEDICINE

## 2022-05-09 PROCEDURE — 83036 HEMOGLOBIN GLYCOSYLATED A1C: CPT | Performed by: FAMILY MEDICINE

## 2022-07-13 ENCOUNTER — OFFICE VISIT (OUTPATIENT)
Dept: INTERNAL MEDICINE CLINIC | Facility: CLINIC | Age: 67
End: 2022-07-13
Payer: MEDICARE

## 2022-07-13 ENCOUNTER — HOSPITAL ENCOUNTER (OUTPATIENT)
Dept: GENERAL RADIOLOGY | Facility: HOSPITAL | Age: 67
Discharge: HOME | End: 2022-07-13
Attending: INTERNAL MEDICINE
Payer: MEDICARE

## 2022-07-13 VITALS
SYSTOLIC BLOOD PRESSURE: 104 MMHG | HEIGHT: 65 IN | OXYGEN SATURATION: 98 % | DIASTOLIC BLOOD PRESSURE: 70 MMHG | HEART RATE: 60 BPM | WEIGHT: 191 LBS | BODY MASS INDEX: 31.82 KG/M2 | TEMPERATURE: 98 F

## 2022-07-13 DIAGNOSIS — E11.8 CONTROLLED TYPE 2 DIABETES MELLITUS WITH COMPLICATION, WITHOUT LONG-TERM CURRENT USE OF INSULIN (HCC): ICD-10-CM

## 2022-07-13 DIAGNOSIS — G89.29 HEEL PAIN, CHRONIC, LEFT: ICD-10-CM

## 2022-07-13 DIAGNOSIS — M79.672 HEEL PAIN, CHRONIC, LEFT: ICD-10-CM

## 2022-07-13 DIAGNOSIS — N52.9 ERECTILE DYSFUNCTION, UNSPECIFIED ERECTILE DYSFUNCTION TYPE: ICD-10-CM

## 2022-07-13 DIAGNOSIS — E11.9 TYPE 2 DIABETES MELLITUS WITHOUT COMPLICATION, WITHOUT LONG-TERM CURRENT USE OF INSULIN (HCC): ICD-10-CM

## 2022-07-13 DIAGNOSIS — Z76.89 ENCOUNTER TO ESTABLISH CARE: Primary | ICD-10-CM

## 2022-07-13 PROCEDURE — 3044F HG A1C LEVEL LT 7.0%: CPT | Performed by: INTERNAL MEDICINE

## 2022-07-13 PROCEDURE — 73630 X-RAY EXAM OF FOOT: CPT | Performed by: INTERNAL MEDICINE

## 2022-07-13 RX ORDER — SIMVASTATIN 20 MG
20 TABLET ORAL NIGHTLY
Qty: 90 TABLET | Refills: 3 | Status: SHIPPED | OUTPATIENT
Start: 2022-07-13

## 2022-07-13 RX ORDER — LOSARTAN POTASSIUM 25 MG/1
25 TABLET ORAL DAILY
Qty: 90 TABLET | Refills: 1 | Status: SHIPPED | OUTPATIENT
Start: 2022-07-13

## 2022-07-13 RX ORDER — SILDENAFIL 100 MG/1
100 TABLET, FILM COATED ORAL
Qty: 20 TABLET | Refills: 1 | Status: SHIPPED | OUTPATIENT
Start: 2022-07-13

## 2022-07-14 ENCOUNTER — TELEPHONE (OUTPATIENT)
Dept: INTERNAL MEDICINE CLINIC | Facility: CLINIC | Age: 67
End: 2022-07-14

## 2022-07-14 DIAGNOSIS — M79.672 PAIN OF LEFT HEEL: Primary | ICD-10-CM

## 2022-07-14 DIAGNOSIS — M77.32 CALCANEAL SPUR OF LEFT FOOT: ICD-10-CM

## 2022-07-14 NOTE — TELEPHONE ENCOUNTER
Patient was notified of the results but prefers to wait until he comes to see Dr Arianna Hernadez to further discuss them.      ----- Message from Alexia Burton MD sent at 7/14/2022  1:07 PM CDT -----  Please inform him that xray shows calcaneal spurs (bony growths on plantar side of foot). These can cause sharp like pains. Will likely need custom made orthotics. Recommend follow up with podiatry. Referral placed.

## 2022-08-16 ENCOUNTER — TELEPHONE (OUTPATIENT)
Dept: INTERNAL MEDICINE CLINIC | Facility: CLINIC | Age: 67
End: 2022-08-16

## 2022-08-16 NOTE — TELEPHONE ENCOUNTER
Pt is calling stating that has some bumps  on both hands and its itchy, pt states that look white and looks like they have some liquid.       Please call and advise

## 2022-08-17 ENCOUNTER — OFFICE VISIT (OUTPATIENT)
Dept: INTERNAL MEDICINE CLINIC | Facility: CLINIC | Age: 67
End: 2022-08-17
Payer: MEDICARE

## 2022-08-17 VITALS
WEIGHT: 196 LBS | OXYGEN SATURATION: 99 % | SYSTOLIC BLOOD PRESSURE: 106 MMHG | TEMPERATURE: 97 F | HEART RATE: 71 BPM | DIASTOLIC BLOOD PRESSURE: 64 MMHG | BODY MASS INDEX: 33 KG/M2

## 2022-08-17 DIAGNOSIS — E11.9 TYPE 2 DIABETES MELLITUS WITHOUT COMPLICATION, WITHOUT LONG-TERM CURRENT USE OF INSULIN (HCC): ICD-10-CM

## 2022-08-17 DIAGNOSIS — E78.5 HYPERLIPIDEMIA, UNSPECIFIED HYPERLIPIDEMIA TYPE: ICD-10-CM

## 2022-08-17 DIAGNOSIS — R21 RASH: Primary | ICD-10-CM

## 2022-08-17 PROCEDURE — 3074F SYST BP LT 130 MM HG: CPT | Performed by: INTERNAL MEDICINE

## 2022-08-17 PROCEDURE — 3078F DIAST BP <80 MM HG: CPT | Performed by: INTERNAL MEDICINE

## 2022-08-17 PROCEDURE — 99214 OFFICE O/P EST MOD 30 MIN: CPT | Performed by: INTERNAL MEDICINE

## 2022-08-17 RX ORDER — TRIAMCINOLONE ACETONIDE 1 MG/G
CREAM TOPICAL 2 TIMES DAILY PRN
Qty: 60 G | Refills: 0 | Status: SHIPPED | OUTPATIENT
Start: 2022-08-17 | End: 2022-08-24

## 2022-08-18 LAB
ALBUMIN/GLOBULIN RATIO: 1.7 (CALC) (ref 1–2.5)
ALBUMIN: 4.5 G/DL (ref 3.6–5.1)
ALKALINE PHOSPHATASE: 52 U/L (ref 35–144)
ALT: 13 U/L (ref 9–46)
AST: 15 U/L (ref 10–35)
BILIRUBIN, TOTAL: 0.4 MG/DL (ref 0.2–1.2)
BUN: 17 MG/DL (ref 7–25)
CALCIUM: 9.8 MG/DL (ref 8.6–10.3)
CARBON DIOXIDE: 25 MMOL/L (ref 20–32)
CHLORIDE: 104 MMOL/L (ref 98–110)
CHOL/HDLC RATIO: 2.3 (CALC)
CHOLESTEROL, TOTAL: 130 MG/DL
CREATININE: 0.77 MG/DL (ref 0.7–1.35)
EGFR: 99 ML/MIN/1.73M2
GLOBULIN: 2.7 G/DL (CALC) (ref 1.9–3.7)
GLUCOSE: 87 MG/DL (ref 65–99)
HDL CHOLESTEROL: 57 MG/DL
HEMATOCRIT: 40.9 % (ref 38.5–50)
HEMOGLOBIN A1C: 6.2 % OF TOTAL HGB
HEMOGLOBIN: 13.8 G/DL (ref 13.2–17.1)
LDL-CHOLESTEROL: 51 MG/DL (CALC)
MCH: 30.2 PG (ref 27–33)
MCHC: 33.7 G/DL (ref 32–36)
MCV: 89.5 FL (ref 80–100)
MPV: 11.1 FL (ref 7.5–12.5)
NON-HDL CHOLESTEROL: 73 MG/DL (CALC)
PLATELET COUNT: 200 THOUSAND/UL (ref 140–400)
POTASSIUM: 4.6 MMOL/L (ref 3.5–5.3)
PROTEIN, TOTAL: 7.2 G/DL (ref 6.1–8.1)
RDW: 12.6 % (ref 11–15)
RED BLOOD CELL COUNT: 4.57 MILLION/UL (ref 4.2–5.8)
SODIUM: 140 MMOL/L (ref 135–146)
TRIGLYCERIDES: 138 MG/DL
WHITE BLOOD CELL COUNT: 7.6 THOUSAND/UL (ref 3.8–10.8)

## 2022-08-24 ENCOUNTER — TELEPHONE (OUTPATIENT)
Dept: INTERNAL MEDICINE CLINIC | Facility: CLINIC | Age: 67
End: 2022-08-24

## 2022-08-24 DIAGNOSIS — L98.9 SKIN PROBLEM: Primary | ICD-10-CM

## 2022-08-24 RX ORDER — TRIAMCINOLONE ACETONIDE 5 MG/G
1 CREAM TOPICAL 2 TIMES DAILY
Qty: 15 G | Refills: 0 | Status: SHIPPED | OUTPATIENT
Start: 2022-08-24

## 2022-08-24 NOTE — TELEPHONE ENCOUNTER
Pt called stating that the cream triamcinolone 0.1 % External Cream is not helping at all for the spots that he has  If possible to change it to something else     Please advise

## 2022-08-24 NOTE — TELEPHONE ENCOUNTER
Inform him his A1C improved significantly. Continue same diabetic meds. Continue good work with dietary changes.   Rest of labs were normal.      PQ

## 2022-08-31 ENCOUNTER — TELEPHONE (OUTPATIENT)
Dept: INTERNAL MEDICINE CLINIC | Facility: CLINIC | Age: 67
End: 2022-08-31

## 2022-08-31 DIAGNOSIS — L98.9 SKIN PROBLEM: Primary | ICD-10-CM

## 2022-08-31 NOTE — TELEPHONE ENCOUNTER
Referral entered for new derm in [facility], Dr. Meka Vazquez. Pt was notified with help of MG for translation purposes. Pt requested copy of referral be mailed to him.

## 2022-08-31 NOTE — TELEPHONE ENCOUNTER
Pt is calling stating that he was notified that dr. Francesco Shone does not take his insurance. Pt wants to know if there is someone else he can go to.       Please call and advise

## 2023-01-05 ENCOUNTER — LAB ENCOUNTER (OUTPATIENT)
Dept: LAB | Facility: HOSPITAL | Age: 68
End: 2023-01-05
Attending: INTERNAL MEDICINE
Payer: MEDICARE

## 2023-01-05 ENCOUNTER — OFFICE VISIT (OUTPATIENT)
Dept: INTERNAL MEDICINE CLINIC | Facility: CLINIC | Age: 68
End: 2023-01-05
Payer: MEDICARE

## 2023-01-05 VITALS
TEMPERATURE: 97 F | HEART RATE: 71 BPM | DIASTOLIC BLOOD PRESSURE: 60 MMHG | SYSTOLIC BLOOD PRESSURE: 100 MMHG | WEIGHT: 204 LBS | OXYGEN SATURATION: 98 % | BODY MASS INDEX: 34 KG/M2

## 2023-01-05 DIAGNOSIS — H52.4 HYPEROPIA WITH ASTIGMATISM AND PRESBYOPIA, BILATERAL: ICD-10-CM

## 2023-01-05 DIAGNOSIS — H52.03 HYPEROPIA WITH ASTIGMATISM AND PRESBYOPIA, BILATERAL: ICD-10-CM

## 2023-01-05 DIAGNOSIS — E11.9 TYPE 2 DIABETES MELLITUS WITHOUT COMPLICATION, WITHOUT LONG-TERM CURRENT USE OF INSULIN (HCC): ICD-10-CM

## 2023-01-05 DIAGNOSIS — Z00.00 MEDICARE ANNUAL WELLNESS VISIT, SUBSEQUENT: Primary | ICD-10-CM

## 2023-01-05 DIAGNOSIS — E11.9 DIABETIC EYE EXAM (HCC): ICD-10-CM

## 2023-01-05 DIAGNOSIS — I10 ESSENTIAL HYPERTENSION: ICD-10-CM

## 2023-01-05 DIAGNOSIS — H25.13 AGE-RELATED NUCLEAR CATARACT OF BOTH EYES: ICD-10-CM

## 2023-01-05 DIAGNOSIS — H52.203 HYPEROPIA WITH ASTIGMATISM AND PRESBYOPIA, BILATERAL: ICD-10-CM

## 2023-01-05 DIAGNOSIS — E78.2 MIXED HYPERLIPIDEMIA: ICD-10-CM

## 2023-01-05 DIAGNOSIS — H43.393 FLOATER, VITREOUS, BILATERAL: ICD-10-CM

## 2023-01-05 DIAGNOSIS — Z01.00 DIABETIC EYE EXAM (HCC): ICD-10-CM

## 2023-01-05 DIAGNOSIS — E11.65 TYPE 2 DIABETES MELLITUS WITH HYPERGLYCEMIA, WITHOUT LONG-TERM CURRENT USE OF INSULIN (HCC): ICD-10-CM

## 2023-01-05 DIAGNOSIS — Z86.008 HISTORY OF CARCINOMA IN SITU OF BLADDER: ICD-10-CM

## 2023-01-05 DIAGNOSIS — Z00.00 ENCOUNTER FOR ANNUAL HEALTH EXAMINATION: ICD-10-CM

## 2023-01-05 DIAGNOSIS — Z12.5 ENCOUNTER FOR SCREENING FOR MALIGNANT NEOPLASM OF PROSTATE: ICD-10-CM

## 2023-01-05 DIAGNOSIS — L43.9 LICHEN PLANUS: ICD-10-CM

## 2023-01-05 PROBLEM — N52.9 ERECTILE DYSFUNCTION: Status: RESOLVED | Noted: 2019-02-21 | Resolved: 2023-01-05

## 2023-01-05 PROBLEM — E66.811 CLASS 1 OBESITY DUE TO EXCESS CALORIES WITH SERIOUS COMORBIDITY AND BODY MASS INDEX (BMI) OF 34.0 TO 34.9 IN ADULT: Status: RESOLVED | Noted: 2018-01-19 | Resolved: 2023-01-05

## 2023-01-05 PROBLEM — E66.09 CLASS 1 OBESITY DUE TO EXCESS CALORIES WITH SERIOUS COMORBIDITY AND BODY MASS INDEX (BMI) OF 34.0 TO 34.9 IN ADULT: Status: RESOLVED | Noted: 2018-01-19 | Resolved: 2023-01-05

## 2023-01-05 PROBLEM — K21.9 GASTROESOPHAGEAL REFLUX DISEASE: Status: RESOLVED | Noted: 2018-06-12 | Resolved: 2023-01-05

## 2023-01-05 PROBLEM — E11.40 TYPE 2 DIABETES MELLITUS WITH DIABETIC NEUROPATHY (HCC): Status: RESOLVED | Noted: 2022-05-09 | Resolved: 2023-01-05

## 2023-01-05 LAB
ALBUMIN SERPL-MCNC: 4.3 G/DL (ref 3.4–5)
ALBUMIN/GLOB SERPL: 1.2 {RATIO} (ref 1–2)
ALP LIVER SERPL-CCNC: 61 U/L
ALT SERPL-CCNC: 26 U/L
ANION GAP SERPL CALC-SCNC: 6 MMOL/L (ref 0–18)
AST SERPL-CCNC: 16 U/L (ref 15–37)
BASOPHILS # BLD AUTO: 0.02 X10(3) UL (ref 0–0.2)
BASOPHILS NFR BLD AUTO: 0.3 %
BILIRUB SERPL-MCNC: 0.5 MG/DL (ref 0.1–2)
BUN BLD-MCNC: 16 MG/DL (ref 7–18)
BUN/CREAT SERPL: 16.3 (ref 10–20)
CALCIUM BLD-MCNC: 9.6 MG/DL (ref 8.5–10.1)
CHLORIDE SERPL-SCNC: 103 MMOL/L (ref 98–112)
CHOLEST SERPL-MCNC: 134 MG/DL (ref ?–200)
CO2 SERPL-SCNC: 28 MMOL/L (ref 21–32)
COMPLEXED PSA SERPL-MCNC: 0.61 NG/ML (ref ?–4)
CREAT BLD-MCNC: 0.98 MG/DL
CREAT UR-SCNC: 53.2 MG/DL
DEPRECATED RDW RBC AUTO: 40.7 FL (ref 35.1–46.3)
EOSINOPHIL # BLD AUTO: 0.18 X10(3) UL (ref 0–0.7)
EOSINOPHIL NFR BLD AUTO: 2.4 %
ERYTHROCYTE [DISTWIDTH] IN BLOOD BY AUTOMATED COUNT: 12.4 % (ref 11–15)
EST. AVERAGE GLUCOSE BLD GHB EST-MCNC: 140 MG/DL (ref 68–126)
FASTING PATIENT LIPID ANSWER: NO
FASTING STATUS PATIENT QL REPORTED: NO
GFR SERPLBLD BASED ON 1.73 SQ M-ARVRAT: 85 ML/MIN/1.73M2 (ref 60–?)
GLOBULIN PLAS-MCNC: 3.7 G/DL (ref 2.8–4.4)
GLUCOSE BLD-MCNC: 87 MG/DL (ref 70–99)
HBA1C MFR BLD: 6.5 % (ref ?–5.7)
HCT VFR BLD AUTO: 44 %
HCV AB SERPL QL IA: NONREACTIVE
HDLC SERPL-MCNC: 66 MG/DL (ref 40–59)
HGB BLD-MCNC: 14.9 G/DL
IMM GRANULOCYTES # BLD AUTO: 0.07 X10(3) UL (ref 0–1)
IMM GRANULOCYTES NFR BLD: 0.9 %
LDLC SERPL CALC-MCNC: 48 MG/DL (ref ?–100)
LYMPHOCYTES # BLD AUTO: 2.77 X10(3) UL (ref 1–4)
LYMPHOCYTES NFR BLD AUTO: 36.4 %
MCH RBC QN AUTO: 30.3 PG (ref 26–34)
MCHC RBC AUTO-ENTMCNC: 33.9 G/DL (ref 31–37)
MCV RBC AUTO: 89.4 FL
MICROALBUMIN UR-MCNC: 2.52 MG/DL
MICROALBUMIN/CREAT 24H UR-RTO: 47.4 UG/MG (ref ?–30)
MONOCYTES # BLD AUTO: 0.61 X10(3) UL (ref 0.1–1)
MONOCYTES NFR BLD AUTO: 8 %
NEUTROPHILS # BLD AUTO: 3.95 X10 (3) UL (ref 1.5–7.7)
NEUTROPHILS # BLD AUTO: 3.95 X10(3) UL (ref 1.5–7.7)
NEUTROPHILS NFR BLD AUTO: 52 %
NONHDLC SERPL-MCNC: 68 MG/DL (ref ?–130)
OSMOLALITY SERPL CALC.SUM OF ELEC: 285 MOSM/KG (ref 275–295)
PLATELET # BLD AUTO: 202 10(3)UL (ref 150–450)
POTASSIUM SERPL-SCNC: 3.9 MMOL/L (ref 3.5–5.1)
PROT SERPL-MCNC: 8 G/DL (ref 6.4–8.2)
RBC # BLD AUTO: 4.92 X10(6)UL
SODIUM SERPL-SCNC: 137 MMOL/L (ref 136–145)
TRIGL SERPL-MCNC: 112 MG/DL (ref 30–149)
VLDLC SERPL CALC-MCNC: 16 MG/DL (ref 0–30)
WBC # BLD AUTO: 7.6 X10(3) UL (ref 4–11)

## 2023-01-05 PROCEDURE — 36415 COLL VENOUS BLD VENIPUNCTURE: CPT | Performed by: INTERNAL MEDICINE

## 2023-01-05 PROCEDURE — 80053 COMPREHEN METABOLIC PANEL: CPT | Performed by: INTERNAL MEDICINE

## 2023-01-05 PROCEDURE — 86803 HEPATITIS C AB TEST: CPT | Performed by: INTERNAL MEDICINE

## 2023-01-05 PROCEDURE — 80061 LIPID PANEL: CPT | Performed by: INTERNAL MEDICINE

## 2023-01-05 PROCEDURE — 83036 HEMOGLOBIN GLYCOSYLATED A1C: CPT | Performed by: INTERNAL MEDICINE

## 2023-01-05 PROCEDURE — 82043 UR ALBUMIN QUANTITATIVE: CPT | Performed by: INTERNAL MEDICINE

## 2023-01-05 PROCEDURE — 3060F POS MICROALBUMINURIA REV: CPT | Performed by: INTERNAL MEDICINE

## 2023-01-05 PROCEDURE — 1125F AMNT PAIN NOTED PAIN PRSNT: CPT | Performed by: INTERNAL MEDICINE

## 2023-01-05 PROCEDURE — 99397 PER PM REEVAL EST PAT 65+ YR: CPT | Performed by: INTERNAL MEDICINE

## 2023-01-05 PROCEDURE — 3074F SYST BP LT 130 MM HG: CPT | Performed by: INTERNAL MEDICINE

## 2023-01-05 PROCEDURE — G0439 PPPS, SUBSEQ VISIT: HCPCS | Performed by: INTERNAL MEDICINE

## 2023-01-05 PROCEDURE — 3061F NEG MICROALBUMINURIA REV: CPT | Performed by: INTERNAL MEDICINE

## 2023-01-05 PROCEDURE — 82570 ASSAY OF URINE CREATININE: CPT | Performed by: INTERNAL MEDICINE

## 2023-01-05 PROCEDURE — 3078F DIAST BP <80 MM HG: CPT | Performed by: INTERNAL MEDICINE

## 2023-01-05 PROCEDURE — 85025 COMPLETE CBC W/AUTO DIFF WBC: CPT | Performed by: INTERNAL MEDICINE

## 2023-01-05 PROCEDURE — 96160 PT-FOCUSED HLTH RISK ASSMT: CPT | Performed by: INTERNAL MEDICINE

## 2023-01-05 PROCEDURE — 3044F HG A1C LEVEL LT 7.0%: CPT | Performed by: INTERNAL MEDICINE

## 2023-01-05 RX ORDER — LOSARTAN POTASSIUM 25 MG/1
25 TABLET ORAL DAILY
Qty: 90 TABLET | Refills: 1 | Status: SHIPPED | OUTPATIENT
Start: 2023-01-05

## 2023-01-05 RX ORDER — GLUCOSAM/CHON-MSM1/C/MANG/BOSW 500-416.6
1 TABLET ORAL DAILY
Qty: 100 EACH | Refills: 3 | Status: SHIPPED | OUTPATIENT
Start: 2023-01-05 | End: 2024-01-05

## 2023-01-12 ENCOUNTER — TELEPHONE (OUTPATIENT)
Dept: INTERNAL MEDICINE CLINIC | Facility: CLINIC | Age: 68
End: 2023-01-12

## 2023-01-12 PROBLEM — H10.13 ALLERGIC CONJUNCTIVITIS OF BOTH EYES: Status: RESOLVED | Noted: 2020-04-04 | Resolved: 2023-01-12

## 2023-01-12 NOTE — TELEPHONE ENCOUNTER
Patient was contacted and results were explained he verbalized understanding.        ----- Message from Parish Polo MD sent at 1/12/2023  2:32 PM CST -----  Please inform the labs reviewed. -PSA normal.  -Hepatitis A normal negative.  -Lipid panel well controlled.   -Hemoglobin A1c well controlled at 6.5.  -Kidney and liver function normal.  -Complete blood count normal.

## 2023-03-16 ENCOUNTER — TELEPHONE (OUTPATIENT)
Dept: INTERNAL MEDICINE CLINIC | Facility: CLINIC | Age: 68
End: 2023-03-16

## 2023-03-16 DIAGNOSIS — E11.9 TYPE 2 DIABETES MELLITUS WITHOUT COMPLICATION, WITHOUT LONG-TERM CURRENT USE OF INSULIN (HCC): ICD-10-CM

## 2023-04-05 ENCOUNTER — TELEPHONE (OUTPATIENT)
Dept: INTERNAL MEDICINE CLINIC | Facility: CLINIC | Age: 68
End: 2023-04-05

## 2023-04-05 NOTE — TELEPHONE ENCOUNTER
Received medical records request from [address] [place] [address] requesting all medical records.      Patient VS [employer]    Sending out request to medical records stat

## 2023-07-06 ENCOUNTER — OFFICE VISIT (OUTPATIENT)
Dept: INTERNAL MEDICINE CLINIC | Facility: CLINIC | Age: 68
End: 2023-07-06
Payer: MEDICARE

## 2023-07-06 VITALS
SYSTOLIC BLOOD PRESSURE: 100 MMHG | DIASTOLIC BLOOD PRESSURE: 60 MMHG | HEART RATE: 73 BPM | OXYGEN SATURATION: 96 % | BODY MASS INDEX: 34 KG/M2 | WEIGHT: 204 LBS | TEMPERATURE: 97 F

## 2023-07-06 DIAGNOSIS — N52.9 ERECTILE DYSFUNCTION, UNSPECIFIED ERECTILE DYSFUNCTION TYPE: ICD-10-CM

## 2023-07-06 DIAGNOSIS — E11.8 CONTROLLED TYPE 2 DIABETES MELLITUS WITH COMPLICATION, WITHOUT LONG-TERM CURRENT USE OF INSULIN (HCC): Primary | ICD-10-CM

## 2023-07-06 DIAGNOSIS — E11.9 TYPE 2 DIABETES MELLITUS WITHOUT COMPLICATION, WITHOUT LONG-TERM CURRENT USE OF INSULIN (HCC): ICD-10-CM

## 2023-07-06 LAB
CARTRIDGE LOT#: 77 NUMERIC
HEMOGLOBIN A1C: 6.6 % (ref 4.3–5.6)

## 2023-07-06 PROCEDURE — 1160F RVW MEDS BY RX/DR IN RCRD: CPT | Performed by: INTERNAL MEDICINE

## 2023-07-06 PROCEDURE — 3074F SYST BP LT 130 MM HG: CPT | Performed by: INTERNAL MEDICINE

## 2023-07-06 PROCEDURE — 3078F DIAST BP <80 MM HG: CPT | Performed by: INTERNAL MEDICINE

## 2023-07-06 PROCEDURE — 99214 OFFICE O/P EST MOD 30 MIN: CPT | Performed by: INTERNAL MEDICINE

## 2023-07-06 PROCEDURE — 1159F MED LIST DOCD IN RCRD: CPT | Performed by: INTERNAL MEDICINE

## 2023-07-06 PROCEDURE — 3044F HG A1C LEVEL LT 7.0%: CPT | Performed by: INTERNAL MEDICINE

## 2023-07-06 PROCEDURE — 83036 HEMOGLOBIN GLYCOSYLATED A1C: CPT | Performed by: INTERNAL MEDICINE

## 2023-07-06 RX ORDER — LOSARTAN POTASSIUM 25 MG/1
25 TABLET ORAL DAILY
Qty: 90 TABLET | Refills: 1 | Status: SHIPPED | OUTPATIENT
Start: 2023-07-06

## 2023-07-06 RX ORDER — SIMVASTATIN 20 MG
20 TABLET ORAL NIGHTLY
Qty: 90 TABLET | Refills: 3 | Status: SHIPPED | OUTPATIENT
Start: 2023-07-06

## 2023-07-06 RX ORDER — SILDENAFIL 100 MG/1
100 TABLET, FILM COATED ORAL
Qty: 20 TABLET | Refills: 3 | Status: SHIPPED | OUTPATIENT
Start: 2023-07-06

## 2023-10-16 ENCOUNTER — HOSPITAL ENCOUNTER (EMERGENCY)
Facility: HOSPITAL | Age: 68
Discharge: HOME | End: 2023-10-16
Attending: STUDENT IN AN ORGANIZED HEALTH CARE EDUCATION/TRAINING PROGRAM
Payer: MEDICARE

## 2023-10-16 ENCOUNTER — APPOINTMENT (OUTPATIENT)
Dept: GENERAL RADIOLOGY | Facility: HOSPITAL | Age: 68
End: 2023-10-16
Attending: STUDENT IN AN ORGANIZED HEALTH CARE EDUCATION/TRAINING PROGRAM
Payer: MEDICARE

## 2023-10-16 VITALS
WEIGHT: 210 LBS | HEART RATE: 55 BPM | DIASTOLIC BLOOD PRESSURE: 61 MMHG | RESPIRATION RATE: 18 BRPM | OXYGEN SATURATION: 95 % | BODY MASS INDEX: 33.75 KG/M2 | HEIGHT: 66 IN | SYSTOLIC BLOOD PRESSURE: 110 MMHG | TEMPERATURE: 98 F

## 2023-10-16 DIAGNOSIS — M25.511 ACUTE PAIN OF RIGHT SHOULDER: Primary | ICD-10-CM

## 2023-10-16 PROCEDURE — 99283 EMERGENCY DEPT VISIT LOW MDM: CPT

## 2023-10-16 PROCEDURE — 73030 X-RAY EXAM OF SHOULDER: CPT | Performed by: STUDENT IN AN ORGANIZED HEALTH CARE EDUCATION/TRAINING PROGRAM

## 2023-10-16 PROCEDURE — 99284 EMERGENCY DEPT VISIT MOD MDM: CPT

## 2023-10-16 RX ORDER — HYDROCODONE BITARTRATE AND ACETAMINOPHEN 5; 325 MG/1; MG/1
1 TABLET ORAL ONCE
Status: COMPLETED | OUTPATIENT
Start: 2023-10-16 | End: 2023-10-16

## 2023-10-16 RX ORDER — HYDROCODONE BITARTRATE AND ACETAMINOPHEN 5; 325 MG/1; MG/1
1-2 TABLET ORAL EVERY 6 HOURS PRN
Qty: 10 TABLET | Refills: 0 | Status: SHIPPED | OUTPATIENT
Start: 2023-10-16 | End: 2023-10-21

## 2023-10-16 NOTE — ED QUICK NOTES
.Patient cleared for discharge by ED MD. Patient verbalizes understanding of discharge instructions. Patient ambulatory upon discharge. Patient verbalizes understanding of discharge prescriptions.

## 2023-10-19 ENCOUNTER — TELEPHONE (OUTPATIENT)
Dept: INTERNAL MEDICINE CLINIC | Facility: CLINIC | Age: 68
End: 2023-10-19

## 2023-10-19 ENCOUNTER — PATIENT OUTREACH (OUTPATIENT)
Dept: CASE MANAGEMENT | Age: 68
End: 2023-10-19

## 2023-10-19 NOTE — TELEPHONE ENCOUNTER
Pt is calling stating that on 10/16 went to er due to fall. Pt states was told to follow up with pcp, but pt did mention he is doing better and wants to know if does need to come in for doctor to see him.     Please call and advise

## 2023-10-19 NOTE — TELEPHONE ENCOUNTER
Patient is doing better and was asked to call again if not better or pain comes back or worsening instead of getting better.

## 2023-10-19 NOTE — PROGRESS NOTES
1st attempt ER f/up apt request  PCP -decline, pt doesn't want to schedule at this time  Closing encounter

## 2023-10-31 ENCOUNTER — NURSE TRIAGE (OUTPATIENT)
Dept: INTERNAL MEDICINE CLINIC | Facility: CLINIC | Age: 68
End: 2023-10-31

## 2023-10-31 ENCOUNTER — HOSPITAL ENCOUNTER (OUTPATIENT)
Age: 68
Discharge: HOME | End: 2023-10-31
Payer: MEDICARE

## 2023-10-31 VITALS
SYSTOLIC BLOOD PRESSURE: 128 MMHG | HEART RATE: 67 BPM | TEMPERATURE: 98 F | DIASTOLIC BLOOD PRESSURE: 66 MMHG | OXYGEN SATURATION: 99 % | RESPIRATION RATE: 18 BRPM

## 2023-10-31 DIAGNOSIS — S49.91XA INJURY OF RIGHT SHOULDER, INITIAL ENCOUNTER: Primary | ICD-10-CM

## 2023-10-31 PROCEDURE — 99203 OFFICE O/P NEW LOW 30 MIN: CPT | Performed by: NURSE PRACTITIONER

## 2023-10-31 RX ORDER — NAPROXEN 500 MG/1
500 TABLET ORAL 2 TIMES DAILY PRN
Qty: 20 TABLET | Refills: 0 | Status: SHIPPED | OUTPATIENT
Start: 2023-10-31 | End: 2023-11-07

## 2023-10-31 NOTE — TELEPHONE ENCOUNTER
Paralee Fairburn on 10/3/23. Was seen in the ED. 10/17/23 hydrocodone was given. Was at [address] [place] [address] today. Naproxen given Tylenol does not work for him because he tried it when he was out of [place]. Ortho referral placed. Future Appointments   Date Time Provider Dao Hilli   11/1/2023  9:20 AM Sascha Barba MD [address] 5 [facility] [place]   11/15/2023  1:40 PM Sascha Barba MD [facility] 5 [facility] [place]   1/8/2024 10:00 AM Sascha Barba MD [address] 5 [facility] [place]        Reason for Disposition   SEVERE pain (e.g., excruciating)    Answer Assessment - Initial Assessment Questions  1. MECHANISM: \"How did the injury happen? \"      Slipped and fell  on his right shoulder on a rainy day   2. ONSET: \"When did the injury happen? \" (Minutes or hours ago)       10/3/23  3. APPEARANCE of INJURY: \"What does the injury look like? \"       No   4. SEVERITY: \"Can you move the shoulder normally? \"       Can not lift up   5. SIZE: For cuts, bruises, or swelling, ask: \"How large is it? \" (e.g., inches or centimeters;  entire joint)       No   6. PAIN: \"Is there pain? \" If Yes, ask: \"How bad is the pain? \"   (e.g., Scale 1-10; or mild, moderate, severe)    - MILD (1-3): doesn't interfere with normal activities    - MODERATE (4-7): interferes with normal activities (e.g., work or school) or awakens from sleep    - SEVERE (8-10): excruciating pain, unable to do any normal activities, unable to move arm at all due to pain      8/10 with movement   7. TETANUS: For any breaks in the skin, ask: \"When was the last tetanus booster?\"        8. OTHER SYMPTOMS: \"Do you have any other symptoms? \" (e.g., loss of sensation)      Feel like arm is hot / numbness at night after sleep   9. PREGNANCY: \"Is there any chance you are pregnant? \" \"When was your last menstrual period? \"      NA    Protocols used: Shoulder Injury-A-[place]

## 2023-10-31 NOTE — TELEPHONE ENCOUNTER
Pt is calling stating that went to  for shoulder pain. Pt was told that needs to see orthopedic. Pt asked since he has an hmo he will need a referral and they told him he would need to follow up with pcp. Pt wants to know if could get referral or could be seen sooner since has appointment until 11/15 with dr. Kalia Calderon.        Please call and advise

## 2023-10-31 NOTE — ED INITIAL ASSESSMENT (HPI)
Pt reports continued right shoulder pain after a fall early October. Seen in ER on 10/16, negative shoulder Xray.

## 2023-10-31 NOTE — DISCHARGE INSTRUCTIONS
Ice and elevate the right shoulder. Rest.  Wear your sling the received in the emergency department for comfort. Take the anti-inflammatory medication twice daily with food. Call and see if you can move up your appointment with your primary care doctor. You may need to see orthopedics, but need a referral from your primary care doctor due to your HMO insurance.

## 2023-11-01 ENCOUNTER — TELEPHONE (OUTPATIENT)
Dept: ORTHOPEDICS CLINIC | Facility: CLINIC | Age: 68
End: 2023-11-01

## 2023-11-01 ENCOUNTER — OFFICE VISIT (OUTPATIENT)
Dept: INTERNAL MEDICINE CLINIC | Facility: CLINIC | Age: 68
End: 2023-11-01
Payer: MEDICARE

## 2023-11-01 VITALS
TEMPERATURE: 97 F | WEIGHT: 201 LBS | BODY MASS INDEX: 32 KG/M2 | SYSTOLIC BLOOD PRESSURE: 100 MMHG | HEART RATE: 67 BPM | OXYGEN SATURATION: 99 % | DIASTOLIC BLOOD PRESSURE: 60 MMHG

## 2023-11-01 DIAGNOSIS — M25.511 ACUTE PAIN OF RIGHT SHOULDER: ICD-10-CM

## 2023-11-01 DIAGNOSIS — M25.619 LIMITED RANGE OF MOTION (ROM) OF SHOULDER: ICD-10-CM

## 2023-11-01 DIAGNOSIS — S49.91XA INJURY OF RIGHT SHOULDER, INITIAL ENCOUNTER: Primary | ICD-10-CM

## 2023-11-01 PROCEDURE — 3078F DIAST BP <80 MM HG: CPT | Performed by: INTERNAL MEDICINE

## 2023-11-01 PROCEDURE — 3074F SYST BP LT 130 MM HG: CPT | Performed by: INTERNAL MEDICINE

## 2023-11-01 PROCEDURE — 1160F RVW MEDS BY RX/DR IN RCRD: CPT | Performed by: INTERNAL MEDICINE

## 2023-11-01 PROCEDURE — 99214 OFFICE O/P EST MOD 30 MIN: CPT | Performed by: INTERNAL MEDICINE

## 2023-11-01 PROCEDURE — 1159F MED LIST DOCD IN RCRD: CPT | Performed by: INTERNAL MEDICINE

## 2023-11-01 NOTE — TELEPHONE ENCOUNTER
Patient scheduled with Dr. Isabella Mayfield for right shoulder. Xray in epic, MRI scheduled for 12/8. Please advise if additional imaging needed.      Future Appointments   Date Time Provider Dao Crook   12/8/2023  7:15 PM [facility] MRI RM1 (1.5T WIDE) [facility] MRI  [facility] [place]   12/11/2023 11:00 AM Nidhi Cavazos MD [facility] [place]   1/17/2024 10:20 AM Fahad Rosenthal MD [facility] 5 [facility]

## 2023-11-13 ENCOUNTER — HOSPITAL ENCOUNTER (OUTPATIENT)
Dept: MRI IMAGING | Facility: HOSPITAL | Age: 68
Discharge: HOME | End: 2023-11-13
Attending: INTERNAL MEDICINE
Payer: MEDICARE

## 2023-11-13 DIAGNOSIS — M25.619 LIMITED RANGE OF MOTION (ROM) OF SHOULDER: ICD-10-CM

## 2023-11-13 DIAGNOSIS — S49.91XA INJURY OF RIGHT SHOULDER, INITIAL ENCOUNTER: ICD-10-CM

## 2023-11-13 DIAGNOSIS — M25.511 ACUTE PAIN OF RIGHT SHOULDER: ICD-10-CM

## 2023-11-13 PROCEDURE — 73221 MRI JOINT UPR EXTREM W/O DYE: CPT | Performed by: INTERNAL MEDICINE

## 2023-11-15 ENCOUNTER — TELEPHONE (OUTPATIENT)
Dept: INTERNAL MEDICINE CLINIC | Facility: CLINIC | Age: 68
End: 2023-11-15

## 2023-11-15 RX ORDER — TRAMADOL HYDROCHLORIDE 50 MG/1
50 TABLET ORAL DAILY PRN
Qty: 30 TABLET | Refills: 0 | Status: SHIPPED | OUTPATIENT
Start: 2023-11-15

## 2023-11-15 RX ORDER — NAPROXEN 500 MG/1
500 TABLET ORAL 2 TIMES DAILY PRN
Qty: 60 TABLET | Refills: 1 | Status: SHIPPED | OUTPATIENT
Start: 2023-11-15

## 2023-11-15 NOTE — TELEPHONE ENCOUNTER
Patient contacted and informed of MRI results. MRI Results:      Full-thickness near complete and retracted tears of the supraspinatus tendon. Partial-thickness articular surface tears of the subscapularis and infraspinatus with subacromial subdeltoid bursitis. Diffuse long head biceps tendinosis with partial thickness tearing of the extra-articular long head biceps tendon and biceps tenosynovitis. No full-thickness or retracted tear. Subcoracoid bursitis. Degenerative tearing throughout the labrum. He has upcoming appt with ortho surgery on 11/27. Sent naproxen and tramadol (to be only used for severe pain).       Dr. Joel De Dios

## 2023-11-22 ENCOUNTER — TELEPHONE (OUTPATIENT)
Dept: INTERNAL MEDICINE CLINIC | Facility: CLINIC | Age: 68
End: 2023-11-22

## 2023-11-22 DIAGNOSIS — S49.91XA RIGHT SHOULDER INJURY, INITIAL ENCOUNTER: Primary | ICD-10-CM

## 2023-11-22 NOTE — TELEPHONE ENCOUNTER
Spoke with patient's wife made her aware that referral was placed for Dr Dee Milligan. Message routed to manage care to expedite.

## 2023-11-22 NOTE — TELEPHONE ENCOUNTER
Patients wife called and states they have an appointment 11/27 with Dr. Mina Lei. She says they need referral but we have one for a Dr. Suzi Dakins and they want Dr. Mina Lei. Please advise patient or wife once ready.

## 2023-11-27 ENCOUNTER — TELEPHONE (OUTPATIENT)
Dept: ORTHOPEDICS CLINIC | Facility: CLINIC | Age: 68
End: 2023-11-27

## 2023-11-27 ENCOUNTER — OFFICE VISIT (OUTPATIENT)
Dept: ORTHOPEDICS CLINIC | Facility: CLINIC | Age: 68
End: 2023-11-27
Payer: MEDICARE

## 2023-11-27 VITALS — HEIGHT: 66 IN | WEIGHT: 201 LBS | BODY MASS INDEX: 32.3 KG/M2

## 2023-11-27 DIAGNOSIS — M19.011 ARTHRITIS OF RIGHT ACROMIOCLAVICULAR JOINT: ICD-10-CM

## 2023-11-27 DIAGNOSIS — S46.011A TRAUMATIC COMPLETE TEAR OF RIGHT ROTATOR CUFF, INITIAL ENCOUNTER: Primary | ICD-10-CM

## 2023-11-27 DIAGNOSIS — S43.439A DEGENERATIVE SUPERIOR LABRAL ANTERIOR-TO-POSTERIOR (SLAP) TEAR OF SHOULDER: ICD-10-CM

## 2023-11-27 DIAGNOSIS — M75.41 IMPINGEMENT SYNDROME OF RIGHT SHOULDER: ICD-10-CM

## 2023-11-27 PROCEDURE — 1159F MED LIST DOCD IN RCRD: CPT | Performed by: ORTHOPAEDIC SURGERY

## 2023-11-27 PROCEDURE — 3008F BODY MASS INDEX DOCD: CPT | Performed by: ORTHOPAEDIC SURGERY

## 2023-11-27 PROCEDURE — 1125F AMNT PAIN NOTED PAIN PRSNT: CPT | Performed by: ORTHOPAEDIC SURGERY

## 2023-11-27 PROCEDURE — 1160F RVW MEDS BY RX/DR IN RCRD: CPT | Performed by: ORTHOPAEDIC SURGERY

## 2023-11-27 PROCEDURE — 99205 OFFICE O/P NEW HI 60 MIN: CPT | Performed by: ORTHOPAEDIC SURGERY

## 2023-11-28 ENCOUNTER — TELEPHONE (OUTPATIENT)
Dept: ORTHOPEDICS CLINIC | Facility: CLINIC | Age: 68
End: 2023-11-28

## 2023-11-28 DIAGNOSIS — M19.011 ARTHRITIS OF RIGHT ACROMIOCLAVICULAR JOINT: ICD-10-CM

## 2023-11-28 DIAGNOSIS — S43.439A DEGENERATIVE SUPERIOR LABRAL ANTERIOR-TO-POSTERIOR (SLAP) TEAR OF SHOULDER: ICD-10-CM

## 2023-11-28 DIAGNOSIS — S46.011A TRAUMATIC COMPLETE TEAR OF RIGHT ROTATOR CUFF, INITIAL ENCOUNTER: Primary | ICD-10-CM

## 2023-11-28 DIAGNOSIS — M75.41 IMPINGEMENT SYNDROME OF RIGHT SHOULDER: ICD-10-CM

## 2023-11-28 NOTE — TELEPHONE ENCOUNTER
Called and spoke with [facility] using an  [employer]me- [phone]). I did discuss possible surgical dates with him as he has decided to proceed with surgery on 12/29/23. I did go over the surgical protocol as well as the post op appt date and time. He states understanding with no questions or concerns.

## 2023-11-29 NOTE — TELEPHONE ENCOUNTER
SURGERY SCHEDULING SHEET    Zach Gonsalez  7/28/1955  [id number]    Procedure: Right Shoulder Arthroscopy, Rotator Cuff Repair (36335), Subacromial Decompression (85023), and Distal Clavicle Excision (49056)  Right shoulder arthroscopy with rotator cuff repair, subacromial decompression, debridement, possible distal claviculectomy       Diagnosis:    Traumatic complete tear of right rotator cuff, initial encounter S46.011A    Impingement syndrome of right shoulder M75.41    Arthritis of right acromioclavicular joint M19.011    Degenerative superior labral anterior-to-posterior (SLAP) tear of shoulder S43.439A    Anesthesia: General    Length of Surgery: 2 hrs    Disposition: Outpatient    Special Equipment: Arthrex    Positioning:    Assist: Yes SK PA-C    Pre-op Testing: PER ANESTHESIA GUIDELINES    Clearance: HISTORY AND PHYSICAL     Post op:  Other: 10-14 days    Arelis Jacob MD  [address] Orthopedic Surgery  Phone: [phone]  Fax: [phone]

## 2023-12-12 ENCOUNTER — OFFICE VISIT (OUTPATIENT)
Dept: INTERNAL MEDICINE CLINIC | Facility: CLINIC | Age: 68
End: 2023-12-12
Payer: MEDICARE

## 2023-12-12 ENCOUNTER — EKG ENCOUNTER (OUTPATIENT)
Dept: LAB | Facility: HOSPITAL | Age: 68
End: 2023-12-12
Attending: ORTHOPAEDIC SURGERY
Payer: MEDICARE

## 2023-12-12 VITALS
TEMPERATURE: 99 F | OXYGEN SATURATION: 97 % | SYSTOLIC BLOOD PRESSURE: 100 MMHG | WEIGHT: 206 LBS | DIASTOLIC BLOOD PRESSURE: 60 MMHG | HEART RATE: 80 BPM | BODY MASS INDEX: 33 KG/M2

## 2023-12-12 DIAGNOSIS — Z01.818 PREOP EXAMINATION: Primary | ICD-10-CM

## 2023-12-12 DIAGNOSIS — S46.011A TRAUMATIC COMPLETE TEAR OF RIGHT ROTATOR CUFF, INITIAL ENCOUNTER: ICD-10-CM

## 2023-12-12 LAB
ANION GAP SERPL CALC-SCNC: 5 MMOL/L (ref 0–18)
ATRIAL RATE: 76 BPM
BUN BLD-MCNC: 14 MG/DL (ref 9–23)
BUN/CREAT SERPL: 15.9 (ref 10–20)
CALCIUM BLD-MCNC: 9.8 MG/DL (ref 8.7–10.4)
CHLORIDE SERPL-SCNC: 105 MMOL/L (ref 98–112)
CO2 SERPL-SCNC: 27 MMOL/L (ref 21–32)
CREAT BLD-MCNC: 0.88 MG/DL
EGFRCR SERPLBLD CKD-EPI 2021: 94 ML/MIN/1.73M2 (ref 60–?)
GLUCOSE BLD-MCNC: 120 MG/DL (ref 70–99)
OSMOLALITY SERPL CALC.SUM OF ELEC: 286 MOSM/KG (ref 275–295)
P AXIS: 50 DEGREES
P-R INTERVAL: 168 MS
POTASSIUM SERPL-SCNC: 3.8 MMOL/L (ref 3.5–5.1)
Q-T INTERVAL: 360 MS
QRS DURATION: 78 MS
QTC CALCULATION (BEZET): 405 MS
R AXIS: 18 DEGREES
SODIUM SERPL-SCNC: 137 MMOL/L (ref 136–145)
T AXIS: 25 DEGREES
VENTRICULAR RATE: 76 BPM

## 2023-12-12 PROCEDURE — 93005 ELECTROCARDIOGRAM TRACING: CPT

## 2023-12-12 PROCEDURE — 80048 BASIC METABOLIC PNL TOTAL CA: CPT

## 2023-12-12 PROCEDURE — 99214 OFFICE O/P EST MOD 30 MIN: CPT | Performed by: INTERNAL MEDICINE

## 2023-12-12 PROCEDURE — 1159F MED LIST DOCD IN RCRD: CPT | Performed by: INTERNAL MEDICINE

## 2023-12-12 PROCEDURE — 93010 ELECTROCARDIOGRAM REPORT: CPT | Performed by: INTERNAL MEDICINE

## 2023-12-12 PROCEDURE — 1160F RVW MEDS BY RX/DR IN RCRD: CPT | Performed by: INTERNAL MEDICINE

## 2023-12-12 PROCEDURE — 3074F SYST BP LT 130 MM HG: CPT | Performed by: INTERNAL MEDICINE

## 2023-12-12 PROCEDURE — 3078F DIAST BP <80 MM HG: CPT | Performed by: INTERNAL MEDICINE

## 2023-12-12 PROCEDURE — 36415 COLL VENOUS BLD VENIPUNCTURE: CPT

## 2023-12-18 ENCOUNTER — ANESTHESIA EVENT (OUTPATIENT)
Dept: SURGERY | Facility: HOSPITAL | Age: 68
End: 2023-12-18
Payer: MEDICARE

## 2023-12-19 ENCOUNTER — HOSPITAL ENCOUNTER (OUTPATIENT)
Facility: HOSPITAL | Age: 68
Setting detail: HOSPITAL OUTPATIENT SURGERY
Discharge: HOME | End: 2023-12-19
Attending: ORTHOPAEDIC SURGERY | Admitting: ORTHOPAEDIC SURGERY
Payer: MEDICARE

## 2023-12-19 ENCOUNTER — ANESTHESIA (OUTPATIENT)
Dept: SURGERY | Facility: HOSPITAL | Age: 68
End: 2023-12-19
Payer: MEDICARE

## 2023-12-19 VITALS
DIASTOLIC BLOOD PRESSURE: 76 MMHG | SYSTOLIC BLOOD PRESSURE: 103 MMHG | WEIGHT: 203.38 LBS | OXYGEN SATURATION: 94 % | TEMPERATURE: 98 F | HEART RATE: 70 BPM | RESPIRATION RATE: 18 BRPM | BODY MASS INDEX: 32.68 KG/M2 | HEIGHT: 66 IN

## 2023-12-19 DIAGNOSIS — S46.011A TRAUMATIC COMPLETE TEAR OF RIGHT ROTATOR CUFF, INITIAL ENCOUNTER: Primary | ICD-10-CM

## 2023-12-19 LAB
GLUCOSE BLD-MCNC: 149 MG/DL (ref 70–99)
GLUCOSE BLD-MCNC: 158 MG/DL (ref 70–99)

## 2023-12-19 PROCEDURE — 82962 GLUCOSE BLOOD TEST: CPT

## 2023-12-19 PROCEDURE — 0RNJ4ZZ RELEASE RIGHT SHOULDER JOINT, PERCUTANEOUS ENDOSCOPIC APPROACH: ICD-10-PCS | Performed by: ORTHOPAEDIC SURGERY

## 2023-12-19 PROCEDURE — 0RQJ4ZZ REPAIR RIGHT SHOULDER JOINT, PERCUTANEOUS ENDOSCOPIC APPROACH: ICD-10-PCS | Performed by: ORTHOPAEDIC SURGERY

## 2023-12-19 PROCEDURE — 0PB94ZZ EXCISION OF RIGHT CLAVICLE, PERCUTANEOUS ENDOSCOPIC APPROACH: ICD-10-PCS | Performed by: ORTHOPAEDIC SURGERY

## 2023-12-19 PROCEDURE — 0LM14ZZ REATTACHMENT OF RIGHT SHOULDER TENDON, PERCUTANEOUS ENDOSCOPIC APPROACH: ICD-10-PCS | Performed by: ORTHOPAEDIC SURGERY

## 2023-12-19 PROCEDURE — 76942 ECHO GUIDE FOR BIOPSY: CPT | Performed by: ANESTHESIOLOGY

## 2023-12-19 DEVICE — SUTURE ANCHOR, PUSHLOCK 3.5 X 19.5MM
Type: IMPLANTABLE DEVICE | Site: SHOULDER | Status: FUNCTIONAL
Brand: ARTHREX®

## 2023-12-19 DEVICE — CORKSCREW FT, PEEK, SUTURETAPE, 4.75M
Type: IMPLANTABLE DEVICE | Site: SHOULDER | Status: FUNCTIONAL
Brand: ARTHREX®

## 2023-12-19 RX ORDER — HYDROCODONE BITARTRATE AND ACETAMINOPHEN 5; 325 MG/1; MG/1
1 TABLET ORAL EVERY 4 HOURS PRN
Qty: 30 TABLET | Refills: 0 | Status: SHIPPED | OUTPATIENT
Start: 2023-12-19

## 2023-12-19 RX ORDER — HYDROMORPHONE HYDROCHLORIDE 1 MG/ML
0.2 INJECTION, SOLUTION INTRAMUSCULAR; INTRAVENOUS; SUBCUTANEOUS EVERY 5 MIN PRN
Status: DISCONTINUED | OUTPATIENT
Start: 2023-12-19 | End: 2023-12-19

## 2023-12-19 RX ORDER — HYDROCODONE BITARTRATE AND ACETAMINOPHEN 5; 325 MG/1; MG/1
1 TABLET ORAL ONCE AS NEEDED
Status: DISCONTINUED | OUTPATIENT
Start: 2023-12-19 | End: 2023-12-19

## 2023-12-19 RX ORDER — DEXAMETHASONE SODIUM PHOSPHATE 10 MG/ML
INJECTION, SOLUTION INTRAMUSCULAR; INTRAVENOUS AS NEEDED
Status: DISCONTINUED | OUTPATIENT
Start: 2023-12-19 | End: 2023-12-19 | Stop reason: SURG

## 2023-12-19 RX ORDER — HYDROCODONE BITARTRATE AND ACETAMINOPHEN 5; 325 MG/1; MG/1
2 TABLET ORAL ONCE AS NEEDED
Status: DISCONTINUED | OUTPATIENT
Start: 2023-12-19 | End: 2023-12-19

## 2023-12-19 RX ORDER — LABETALOL HYDROCHLORIDE 5 MG/ML
5 INJECTION, SOLUTION INTRAVENOUS EVERY 5 MIN PRN
Status: DISCONTINUED | OUTPATIENT
Start: 2023-12-19 | End: 2023-12-19

## 2023-12-19 RX ORDER — ACETAMINOPHEN 500 MG
1000 TABLET ORAL ONCE
Status: DISCONTINUED | OUTPATIENT
Start: 2023-12-19 | End: 2023-12-19 | Stop reason: HOSPADM

## 2023-12-19 RX ORDER — KETOROLAC TROMETHAMINE 30 MG/ML
INJECTION, SOLUTION INTRAMUSCULAR; INTRAVENOUS AS NEEDED
Status: DISCONTINUED | OUTPATIENT
Start: 2023-12-19 | End: 2023-12-19 | Stop reason: SURG

## 2023-12-19 RX ORDER — DEXTROSE MONOHYDRATE 25 G/50ML
50 INJECTION, SOLUTION INTRAVENOUS
Status: DISCONTINUED | OUTPATIENT
Start: 2023-12-19 | End: 2023-12-19 | Stop reason: HOSPADM

## 2023-12-19 RX ORDER — NICOTINE POLACRILEX 4 MG
15 LOZENGE BUCCAL
Status: DISCONTINUED | OUTPATIENT
Start: 2023-12-19 | End: 2023-12-19 | Stop reason: HOSPADM

## 2023-12-19 RX ORDER — METOCLOPRAMIDE HYDROCHLORIDE 5 MG/ML
INJECTION INTRAMUSCULAR; INTRAVENOUS AS NEEDED
Status: DISCONTINUED | OUTPATIENT
Start: 2023-12-19 | End: 2023-12-19 | Stop reason: SURG

## 2023-12-19 RX ORDER — ONDANSETRON 2 MG/ML
INJECTION INTRAMUSCULAR; INTRAVENOUS AS NEEDED
Status: DISCONTINUED | OUTPATIENT
Start: 2023-12-19 | End: 2023-12-19 | Stop reason: SURG

## 2023-12-19 RX ORDER — TRANEXAMIC ACID 10 MG/ML
INJECTION, SOLUTION INTRAVENOUS AS NEEDED
Status: DISCONTINUED | OUTPATIENT
Start: 2023-12-19 | End: 2023-12-19 | Stop reason: SURG

## 2023-12-19 RX ORDER — DIPHENHYDRAMINE HYDROCHLORIDE 50 MG/ML
12.5 INJECTION INTRAMUSCULAR; INTRAVENOUS AS NEEDED
Status: DISCONTINUED | OUTPATIENT
Start: 2023-12-19 | End: 2023-12-19

## 2023-12-19 RX ORDER — ACETAMINOPHEN 500 MG
1000 TABLET ORAL ONCE AS NEEDED
Status: DISCONTINUED | OUTPATIENT
Start: 2023-12-19 | End: 2023-12-19

## 2023-12-19 RX ORDER — CEFAZOLIN SODIUM/WATER 2 G/20 ML
2 SYRINGE (ML) INTRAVENOUS ONCE
Status: COMPLETED | OUTPATIENT
Start: 2023-12-19 | End: 2023-12-19

## 2023-12-19 RX ORDER — LIDOCAINE HYDROCHLORIDE 10 MG/ML
INJECTION, SOLUTION EPIDURAL; INFILTRATION; INTRACAUDAL; PERINEURAL AS NEEDED
Status: DISCONTINUED | OUTPATIENT
Start: 2023-12-19 | End: 2023-12-19 | Stop reason: SURG

## 2023-12-19 RX ORDER — HYDROMORPHONE HYDROCHLORIDE 1 MG/ML
0.4 INJECTION, SOLUTION INTRAMUSCULAR; INTRAVENOUS; SUBCUTANEOUS EVERY 5 MIN PRN
Status: DISCONTINUED | OUTPATIENT
Start: 2023-12-19 | End: 2023-12-19

## 2023-12-19 RX ORDER — NICOTINE POLACRILEX 4 MG
30 LOZENGE BUCCAL
Status: DISCONTINUED | OUTPATIENT
Start: 2023-12-19 | End: 2023-12-19 | Stop reason: HOSPADM

## 2023-12-19 RX ORDER — METOCLOPRAMIDE HYDROCHLORIDE 5 MG/ML
10 INJECTION INTRAMUSCULAR; INTRAVENOUS EVERY 8 HOURS PRN
Status: DISCONTINUED | OUTPATIENT
Start: 2023-12-19 | End: 2023-12-19

## 2023-12-19 RX ORDER — ONDANSETRON 2 MG/ML
4 INJECTION INTRAMUSCULAR; INTRAVENOUS EVERY 6 HOURS PRN
Status: DISCONTINUED | OUTPATIENT
Start: 2023-12-19 | End: 2023-12-19

## 2023-12-19 RX ORDER — NALOXONE HYDROCHLORIDE 0.4 MG/ML
80 INJECTION, SOLUTION INTRAMUSCULAR; INTRAVENOUS; SUBCUTANEOUS AS NEEDED
Status: DISCONTINUED | OUTPATIENT
Start: 2023-12-19 | End: 2023-12-19

## 2023-12-19 RX ORDER — MEPERIDINE HYDROCHLORIDE 25 MG/ML
12.5 INJECTION INTRAMUSCULAR; INTRAVENOUS; SUBCUTANEOUS AS NEEDED
Status: DISCONTINUED | OUTPATIENT
Start: 2023-12-19 | End: 2023-12-19

## 2023-12-19 RX ORDER — ROCURONIUM BROMIDE 10 MG/ML
INJECTION, SOLUTION INTRAVENOUS AS NEEDED
Status: DISCONTINUED | OUTPATIENT
Start: 2023-12-19 | End: 2023-12-19 | Stop reason: SURG

## 2023-12-19 RX ORDER — SODIUM CHLORIDE, SODIUM LACTATE, POTASSIUM CHLORIDE, CALCIUM CHLORIDE 600; 310; 30; 20 MG/100ML; MG/100ML; MG/100ML; MG/100ML
INJECTION, SOLUTION INTRAVENOUS CONTINUOUS
Status: DISCONTINUED | OUTPATIENT
Start: 2023-12-19 | End: 2023-12-19

## 2023-12-19 RX ORDER — BUPRENORPHINE HYDROCHLORIDE 0.32 MG/ML
INJECTION INTRAMUSCULAR; INTRAVENOUS AS NEEDED
Status: DISCONTINUED | OUTPATIENT
Start: 2023-12-19 | End: 2023-12-19 | Stop reason: SURG

## 2023-12-19 RX ORDER — HYDROMORPHONE HYDROCHLORIDE 1 MG/ML
0.6 INJECTION, SOLUTION INTRAMUSCULAR; INTRAVENOUS; SUBCUTANEOUS EVERY 5 MIN PRN
Status: DISCONTINUED | OUTPATIENT
Start: 2023-12-19 | End: 2023-12-19

## 2023-12-19 RX ORDER — MIDAZOLAM HYDROCHLORIDE 1 MG/ML
INJECTION INTRAMUSCULAR; INTRAVENOUS AS NEEDED
Status: DISCONTINUED | OUTPATIENT
Start: 2023-12-19 | End: 2023-12-19 | Stop reason: SURG

## 2023-12-19 RX ADMIN — ROCURONIUM BROMIDE 50 MG: 10 INJECTION, SOLUTION INTRAVENOUS at 07:19:00

## 2023-12-19 RX ADMIN — CEFAZOLIN SODIUM/WATER 2 G: 2 G/20 ML SYRINGE (ML) INTRAVENOUS at 07:14:00

## 2023-12-19 RX ADMIN — DEXAMETHASONE SODIUM PHOSPHATE 2 MG: 10 INJECTION, SOLUTION INTRAMUSCULAR; INTRAVENOUS at 07:19:00

## 2023-12-19 RX ADMIN — SODIUM CHLORIDE, SODIUM LACTATE, POTASSIUM CHLORIDE, CALCIUM CHLORIDE: 600; 310; 30; 20 INJECTION, SOLUTION INTRAVENOUS at 07:51:00

## 2023-12-19 RX ADMIN — METOCLOPRAMIDE HYDROCHLORIDE 10 MG: 5 INJECTION INTRAMUSCULAR; INTRAVENOUS at 09:36:00

## 2023-12-19 RX ADMIN — TRANEXAMIC ACID 1000 MG: 10 INJECTION, SOLUTION INTRAVENOUS at 07:14:00

## 2023-12-19 RX ADMIN — MIDAZOLAM HYDROCHLORIDE 2 MG: 1 INJECTION INTRAMUSCULAR; INTRAVENOUS at 07:05:00

## 2023-12-19 RX ADMIN — ROCURONIUM BROMIDE 30 MG: 10 INJECTION, SOLUTION INTRAVENOUS at 08:54:00

## 2023-12-19 RX ADMIN — SODIUM CHLORIDE, SODIUM LACTATE, POTASSIUM CHLORIDE, CALCIUM CHLORIDE: 600; 310; 30; 20 INJECTION, SOLUTION INTRAVENOUS at 07:05:00

## 2023-12-19 RX ADMIN — BUPRENORPHINE HYDROCHLORIDE 150 MCG: 0.32 INJECTION INTRAMUSCULAR; INTRAVENOUS at 07:19:00

## 2023-12-19 RX ADMIN — KETOROLAC TROMETHAMINE 30 MG: 30 INJECTION, SOLUTION INTRAMUSCULAR; INTRAVENOUS at 09:36:00

## 2023-12-19 RX ADMIN — LIDOCAINE HYDROCHLORIDE 100 MG: 10 INJECTION, SOLUTION EPIDURAL; INFILTRATION; INTRACAUDAL; PERINEURAL at 07:19:00

## 2023-12-19 RX ADMIN — ONDANSETRON 4 MG: 2 INJECTION INTRAMUSCULAR; INTRAVENOUS at 09:36:00

## 2023-12-19 NOTE — OPERATIVE REPORT
[facility] OPERATIVE NOTE    PATIENT'S NAME: Ace Copping     ATTENDING PHYSICIAN: Yasmani Anaya MD   OPERATING PHYSICIAN: Hector Cramer MD   [facility]#:   [id number]  MEDICAL RECORD #:   [id number]    YOB: 1955  ADMISSION DATE:       12/19/2023     OPERATION DATE:  12/19/2023    Preoperative Diagnosis: Traumatic complete tear of right rotator cuff, initial encounter [S46.011A]  Impingement syndrome of right shoulder [M75.41]  Arthritis of right acromioclavicular joint [M19.011]  Degenerative superior labral anterior-to-posterior (SLAP) tear of shoulder [S43.439A]    Postoperative Diagnosis: Traumatic complete tear of right rotator cuff, initial encounter [S46.011A]Impingement syndrome of right shoulder [M75.41]Arthritis of right acromioclavicular joint [M19.011]Degenerative superior labral anterior-to-posterior (SLAP) tear of shoulder [S43.4  39A], partial tear proximal biceps tendon    Procedures Performed: Right shoulder arthroscopy with rotator cuff repair, subacromial decompression, distal claviculectomy, extensive debridement proximal biceps tendon, biceps labral complex, superior labral tear anterior to posterior, greater tuberosity bone and subdeltoid bursa    Primary Surgeon: Hector Cramer MD     Assistant: PA: Amil Cheadle, PA-C    Surgical Findings: See post-op    Anesthesia: General    Complications: none    Specimen: none    Drains: none    Condition: stable    Estimated Blood Loss: 25 ml    Implants:  Arthrex 4.75 mm corkscrew FT peek x 2, 3.5 mm peek push lock x 2, free 1.3 mm suture tape margin convergence stitch     INDICATIONS FOR PROCEDURE:  The patient is a 76year old male who was seen in our office for persistent and progressive right shoulder pain and weakness. He sustained an injury when he slipped and fell on wet leaves in mid October. He was suspected to have damaged his rotator cuff.   This was confirmed on MRI which demonstrated full-thickness tearing of the rotator cuff with retraction. Significant subacromial spur formation lateral downslope and AC joint arthritic changes were also noted. We therefore discussed surgical treatment options given poor function and continued pain. My recommendation was for arthroscopic rotator cuff repair with appropriate treatment of associated bony and soft tissue abnormalities. We discussed the nature of the procedure and the typical postoperative course. This included, but was not limited to possible infection, bleeding, neurovascular injury as well as postoperative stiffness, continued pain, no improvement following surgery or retearing. Risks of anesthesia were also reviewed including but not limited to possible cardiac, pulmonary or cerebrovascular accident, any of which could be life threatening. The patient understood these risks and elected to proceed. Appropriate pre-anesthesia clearance was provided by the patient's primary care physician's office. Informed consent was obtained. DESCRIPTION OF PROCEDURE:  The patient was identified in the preoperative holding area where the right shoulder was clipped of excess hairs as needed and initialed by me. Informed consent was confirmed. The patient was then taken to the operating room and placed supine on the operating table with a beanbag underneath. A regional block was administered by anesthesia. The patient was then administered general anesthesia without complication. The patient was then carefully placed in the left lateral decubitus position with an axillary roll, pillows between the knees and all bony prominences well padded. The beanbag was deflated and the patient was then carefully prepped and draped in the usual sterile fashion with the appropriate counterweight to balance the arm on an Acufex shoulder traction boom.      After confirming consent, side and prophylactic antibiotics with an appropriate time-out, bony prominences were marked and standard diagnostic arthroscopy was initiated through a vertical stab incision at the posterior soft spot. Semi-blunt atraumatic entry into the glenohumeral joint revealed normal-appearing cartilage at the humeral head and glenoid fossa. The biceps tendon appeared frayed largely intact with moderate surrounding synovitis and normally anchored at the supraglenoid tubercle. A spinal needle was used to localize an anterior interval portal.  Meticulous probing identified type I degenerative labral tearing at the anterior superior labrum extending posteriorly. This was gently debrided with a 4.0 mm shaver extending posteriorly to a stable margin. Biceps labral complex was also debrided to the chondral labral junction. This was followed by probing which comfirmed no significant peel off. The biceps tendon was probed and delivered into the joint demonstrating no subluxation distal sided fraying with a view into the subacromial space and an overhanging subacromial spur. Superior glenohumeral ligament, middle glenohumeral ligament, subscapularis, anterior inferior glenohumeral ligament and labral tissue were sequentially probed and intact except for degenerative type I tearing at the anterior inferior glenoid labrum extending posterior inferior. This was also gently debrided with the 4-0 synovial resector. Axillary pouch was free of loose bodies or significant synovitis. Posterior inferior labrum was intact. Arthroscope was redirected into the subdeltoid space. Attention was drawn to the rotator cuff. There was a large retracted tear of the supraspinatus and infraspinatus beginning just posterior to the biceps. Cuff mobilization was performed and I felt it was adequate for direct repair.   The irregular margins of the tendon were debrided with a shaver and extensive debridement of the exposed greater tuberosity was required to create a bleeding corticocancellous bone bed just lateral to the humeral articular cartilage extending to the greater tuberosity lateral margin. This was achieved using a combination of a 4.0 esther, shaver and currettes as needed. A prominent anterolateral spur was present with frayed underhanging coracoacromial ligament tissue. Soft tissue was ablated and hemostasis was achieved with the Arthrocare radiofrequency device, followed by bony decompression using a 4.0 barrel-tip esther. Approximately 5 mm of the prominent anterolateral acromion and lateral edge was decompressed using the lateral cutting block technique. This was extended to the [facility] joint. There was no residual articular disk or cartilage but exposed bone at the distal head of the clavicle. Joint space was about 3 mm posteriorly and there is significant inferior dangling osteophytes. In light of the patient's symptoms preoperatively, a distal claviculectomy was carried out to expand the joint space to about 8-9 mm. A 4.0 barrel-tip bur was used initially from the anterolateral portal and then from the anterior portal for circumferential bony debridement. Hemostasis and soft tissue ablation was achieved with the Arthrocare device. Attention was then drawn to the rotator cuff tear. After adequate cuff mobility was confirmed, we placed a posterior apex margin convergence side-to-side suture tape 1.3 mm with the scorpion device. This converted the tear to a large crescent configuration. We then placed two 4.75 mm PEEK Corkscrew FTs loaded with suturetape at the rotator cuff insertion just lateral to the articular cartilage. Horizontal mattress sutures were passed using the Scorpion device. Sliding locking knots were then tied marching from posterior to anterior, reducing the cuff at the articular margin. The lateral edge of the cuff was then compressed into the prepared corticocancellous bed with a lateral row suture bridge.   The suture tape tails were sequentially extended off the posterolateral and anterolateral cortex of the proximal humerus and impacted into a  holes made with the appropriate punch for 3.5 mm PEEK PushLocks. This compressed the cuff nicely into the lateral edge creating an anatomic equivalent repair. Satisfactory compression, fixation and reduction of the cuff was achieved. The repair was probed, felt to be stable and final photographs were obtained. The subacromial space was evacuated of debris. Closure of the portals was performed using 2-0 nylon. A sterile non-adhesive dressing was applied with povidine ointment, adaptic, fluffs, ABDs and microfoam tape. Abduction pilliow and sling was applied. All sponge, needle and instrument counts were correct. The patient tolerated the procedure well with no evidence of intraoperative complications. Dedicated assistance from Ramandeep Moss PA-C was paramount for successful completion of the case, beginning with the patient set up, arthroscope and suture management, arm manipulation during hardware implantation, wound closure and dressing application. The patient was taken to the postanesthesia recovery room in stable condition. Lila Shannon MD, [place] [address] Medicine/Knee and Shoulder  [facility] Department of Orthopaedics  Phone [phone]  Fax [phone]      10:47 AM  12/19/23

## 2023-12-19 NOTE — ANESTHESIA PROCEDURE NOTES
Airway  Date/Time: 12/19/2023 7:20 AM  Urgency: elective    Airway not difficult    General Information and Staff    Patient location during procedure: OR  Anesthesiologist: Jenny Chavez MD  Performed: anesthesiologist   Performed by: Jenny Chavez MD  Authorized by: Jenny Chavez MD      Indications and Patient Condition  Indications for airway management: anesthesia  Spontaneous Ventilation: absent  Sedation level: deep  Preoxygenated: yes  Patient position: sniffing  Mask difficulty assessment: 1 - vent by mask    Final Airway Details  Final airway type: endotracheal airway      Successful airway: ETT  Cuffed: yes   Successful intubation technique: Video laryngoscopy  Facilitating devices/methods: intubating stylet  Endotracheal tube insertion site: oral  Blade: GlideScope  Blade size: #3  ETT size (mm): 7.5    Cormack-Lehane Classification: grade I - full view of glottis  Placement verified by: capnometry   Cuff volume (mL): 11  Measured from: lips  ETT to lips (cm): 23  Number of attempts at approach: 1  Number of other approaches attempted: 0

## 2023-12-19 NOTE — BRIEF OP NOTE
Pre-Operative Diagnosis: Traumatic complete tear of right rotator cuff, initial encounter [S46.011A]  Impingement syndrome of right shoulder [M75.41]  Arthritis of right acromioclavicular joint [M19.011]  Degenerative superior labral anterior-to-posterior (SLAP) tear of shoulder [S43.439A]     Post-Operative Diagnosis: Traumatic complete tear of right rotator cuff, initial encounter [S46.011A]Impingement syndrome of right shoulder [M75.41]Arthritis of right acromioclavicular joint [M19.011]Degenerative superior labral anterior-to-posterior (SLAP) tear of shoulder [S43.439A] partial biceps tendon tear     Procedure Performed:   RIGHT SHOULDER ARTHROSCOPY ROTATOR CUFF REPAIR, SUBACROMIAL DECOMPRESSION, DEBRIDEMENT, DISTAL CLAVICULECTOMY.     Surgeon(s) and Role:     Virgen Mendoza MD - Primary    Assistant(s):  PA: Uzma Gamble PA-C     Surgical Findings: see operative report      Specimen: na      Estimated Blood Loss: Blood Output: 25 mL (12/19/2023  9:31 AM)        Ander Orourke PA-C  12/19/2023  10:08 AM

## 2023-12-19 NOTE — DISCHARGE INSTRUCTIONS
Post-operative Shoulder Arthroscopy    Medication: Before you leave the hospital, you will be given a prescription for a pain reliever. This medication contains a narcotic with acetaminophen (Tylenol), so do not take any additional Tylenol. You may take Tylenol or Ibuprofen instead of the prescription as the pain subsides. If you take a daily Aspirin you may resume taking your Aspirin the evening of surgery. Day of Surgery: When you return home, sleeping upright (such as in a recliner) may be more comfortable for the first few days. Place an ice bag over the dressing for about 20 minutes three or four times a day for the first 5 days while protecting the skin with a sterile dressing and towel. Dressings: The dressing should remain in place for 48 hours. After 48 hours, remove the foam tape and dressings. Apply a small amount of Betadine ointment to sutures and cover with a breathable band-aid. Showering: Before showering, apply waterproof band-aids to sutures to ensure these areas do not get wet. Once finished, remove band-aids, let area air dry, and apply another small amount of Betadine ointment and regular band-aids. Do not immerse or soak the surgical wound (no baths). When changing Band-Aids, look in the mirror and make sure your face or chin does not touch the incisions. Activity:  For shoulder arthroscopy: Flex and extend the hand, wrist, and elbow and begin pendulums as instructed for underarm hygiene and dressing. Follow up: You will be scheduled for a follow up office visit in 7-10 days to have your sutures taken out. Your plan for physical therapy, driving, and returning to work will be discussed at this appointment. Please don't hesitate to contact our office at [phone] if you have any post-operative questions or concerns.      You received a drug called Toradol which is an Anti Inflammatory at: 9:30am  If you are allowed to take Anti inflammatories:    Do not take any Anti Inflammatory like Motrin, Aleve or Ibuprofen until after: 3:30pm  Please report any suspected allergic reactions or bleeding issues to your doctor

## 2023-12-19 NOTE — INTERVAL H&P NOTE
Pre-op Diagnosis: Traumatic complete tear of right rotator cuff, initial encounter [S46.011A]  Impingement syndrome of right shoulder [M75.41]  Arthritis of right acromioclavicular joint [M19.011]  Degenerative superior labral anterior-to-posterior (SLAP) tear of shoulder [S43.439A]    The above referenced H&P was reviewed by Samantha Rose MD on 12/19/2023, the patient was examined and no significant changes have occurred in the patient's condition since the H&P was performed. I discussed with the patient and/or legal representative the potential benefits, risks and side effects of this procedure; the likelihood of the patient achieving goals; and potential problems that might occur during recuperation. I discussed reasonable alternatives to the procedure, including risks, benefits and side effects related to the alternatives and risks related to not receiving this procedure. We will proceed with procedure as planned.

## 2023-12-19 NOTE — ANESTHESIA PROCEDURE NOTES
Regional Block    Date/Time: 12/19/2023 7:15 AM    Performed by: Fozia Sainz MD  Authorized by: Fozia Sainz MD      General Information and Staff    Start Time:  12/19/2023 7:15 AM  End Time:  12/19/2023 7:19 AM  Anesthesiologist:  Fozia Sainz MD  Performed by: Anesthesiologist  Patient Location:  OR      Site Identification: real time ultrasound guided and image stored and retrievable    Block site/laterality marked before start: site marked  Reason for Block: at surgeon's request and post-op pain management    Preanesthetic Checklist: 2 patient identifers, IV checked, site marked, risks and benefits discussed, monitors and equipment checked, pre-op evaluation, timeout performed, anesthesia consent, sterile technique used, no prohibitive neurological deficits and no local skin infection at insertion site      Procedure Details    Patient Position:  Supine  Prep: ChloraPrep    Monitoring:  Cardiac monitor, continuous pulse ox, blood pressure cuff and heart rate  Block Type: Interscalene  Laterality:  Right  Injection Technique:  Single-shot    Needle    Needle Type:  Short-bevel and echogenic  Needle Gauge:  21 G  Needle Length:  50 mm  Needle Localization:  Ultrasound guidance  Reason for Ultrasound Use: appropriate spread of the medication was noted in real time and no ultrasound evidence of intravascular and/or intraneural injection            Assessment    Injection Assessment:  Good spread noted, negative resistance, negative aspiration for heme, incremental injection, low pressure, local visualized surrounding nerve on ultrasound and no pain on injection  Paresthesia Pain:  None  Heart Rate Change: No    - Patient tolerated block procedure well without evidence of immediate block related complications.      Medications  12/19/2023 7:15 AM      Additional Comments    Medication:  Ropivacaine 0.5% 30mL with 1:200,000 epi, PF decadron 2mg, and buprenorphine 150mcg

## 2023-12-28 ENCOUNTER — OFFICE VISIT (OUTPATIENT)
Dept: ORTHOPEDICS CLINIC | Facility: CLINIC | Age: 68
End: 2023-12-28
Payer: MEDICARE

## 2023-12-28 VITALS — HEIGHT: 66 IN | BODY MASS INDEX: 32.62 KG/M2 | WEIGHT: 203 LBS

## 2023-12-28 DIAGNOSIS — Z98.890 STATUS POST RIGHT ROTATOR CUFF REPAIR: ICD-10-CM

## 2023-12-28 DIAGNOSIS — Z48.89 AFTERCARE FOLLOWING SURGERY: Primary | ICD-10-CM

## 2023-12-28 PROCEDURE — 1160F RVW MEDS BY RX/DR IN RCRD: CPT | Performed by: PHYSICIAN ASSISTANT

## 2023-12-28 PROCEDURE — 3008F BODY MASS INDEX DOCD: CPT | Performed by: PHYSICIAN ASSISTANT

## 2023-12-28 PROCEDURE — 99024 POSTOP FOLLOW-UP VISIT: CPT | Performed by: PHYSICIAN ASSISTANT

## 2023-12-28 PROCEDURE — 1159F MED LIST DOCD IN RCRD: CPT | Performed by: PHYSICIAN ASSISTANT

## 2023-12-28 PROCEDURE — 1125F AMNT PAIN NOTED PAIN PRSNT: CPT | Performed by: PHYSICIAN ASSISTANT

## 2023-12-28 NOTE — PROGRESS NOTES
[facility] Post Op Progress Note    Date of Surgery: 12/19/2023      Subjective: Norma Goldberg is a 76year old male who is here for follow-up of his right shoulder. He underwent right shoulder arthroscopy with rotator cuff repair, subacromial decompression distal clavicle excision and debridement of biceps approximately 9 days ago by Dr. Tatiana Bro. He has been tolerating the sling and does have some soreness of the shoulder that is improved by Tylenol. He is here with his wife today who is helping translate. No other complaints. Objective: Exam of the right shoulder and upper extremity reveals that the portals are clean, dry, and intact and healing well. Sutures were removed and Band-Aids were applied. He has full range of motion of the elbow, wrist, and digits. Sensation is present to light touch. Assessment: Status post right shoulder arthroscopy with rotator cuff repair, subacromial decompression, distal clavicle excision and debridement of biceps      Plan: Arthroscopic pictures and surgical findings were discussed with the patient and his wife today. There was a rotator cuff tear that was repaired and a large subacromial spur that was decompressed. He will continue wearing the sling for an additional 3 weeks. In 2 weeks he is able to discontinue the pillow. He will start in formal physical therapy and a PT order was placed. Wound care was discussed and he will avoid submersion of wounds for an additional 2 to 3 weeks. I would like to see him back in 3 weeks for recheck including range of motion assessment. They will follow-up sooner with questions, concerns, or worsening symptoms.       DENVER Sahu, PAVirgenC Orthopedic Surgery   [facility]  [address]   t: [phone]  f: [phone]

## 2024-01-03 ENCOUNTER — TELEPHONE (OUTPATIENT)
Dept: INTERNAL MEDICINE CLINIC | Facility: CLINIC | Age: 69
End: 2024-01-03

## 2024-01-03 DIAGNOSIS — E11.9 TYPE 2 DIABETES MELLITUS WITHOUT COMPLICATION, WITHOUT LONG-TERM CURRENT USE OF INSULIN (HCC): ICD-10-CM

## 2024-01-03 RX ORDER — LOSARTAN POTASSIUM 25 MG/1
25 TABLET ORAL DAILY
Qty: 90 TABLET | Refills: 1 | Status: SHIPPED | OUTPATIENT
Start: 2024-01-03

## 2024-01-03 NOTE — TELEPHONE ENCOUNTER
Mail order pharmacy looking to get refills, we need to make sure with patient if this what he prefers.  Losartan was refilled per patient ok to use mail order

## 2024-01-10 ENCOUNTER — TELEPHONE (OUTPATIENT)
Dept: PHYSICAL THERAPY | Facility: HOSPITAL | Age: 69
End: 2024-01-10

## 2024-01-11 ENCOUNTER — OFFICE VISIT (OUTPATIENT)
Dept: PHYSICAL THERAPY | Age: 69
End: 2024-01-11
Attending: PHYSICIAN ASSISTANT
Payer: MEDICARE

## 2024-01-11 DIAGNOSIS — Z98.890 STATUS POST RIGHT ROTATOR CUFF REPAIR: ICD-10-CM

## 2024-01-11 DIAGNOSIS — Z48.89 AFTERCARE FOLLOWING SURGERY: Primary | ICD-10-CM

## 2024-01-11 PROCEDURE — 97161 PT EVAL LOW COMPLEX 20 MIN: CPT

## 2024-01-11 PROCEDURE — 97110 THERAPEUTIC EXERCISES: CPT

## 2024-01-11 NOTE — PROGRESS NOTES
[facility] EVALUATION:   Referring Physician: Dr. Ansari  Diagnosis:  Aftercare following surgery (Z48.89)  Status post right rotator cuff repair (Z98.890)    Date of Onset: December 19, 2023 Date of Service: 1/11/2024     PATIENT SUMMARY   Bhargav Love is a 68 year old y/o male who presents to therapy today with complaints of right rotator cuff repair surgery  Pt describes pain level 4-5/10  History of current condition: Patient reports he slipped and fell and injured his arm.  He reports he had surgery on 12/19/2023.  Current functional limitations include reaching, lifting, sleeping, ADLs, carrying, pushing, pulling  Bhargav describes prior level of function independent  Pt goals include pain relief and return to prior level of function  Past medical history was reviewed with Bhargav. Patient has a past medical history of Essential hypertension, History of carcinoma in situ of bladder, Hyperlipidemia, Type 2 diabetes mellitus without complication, without long-term current use of insulin (HCC), and Visual impairment.   Social hx: Pt is right handed. He is retired.      ASSESSMENT:   Patient presents to PT clinic s/p right rotator cuff repair surgery on 12/19/2023.  Patient demos decreased R shoulder ROM, decreased RUE strength, and pain.  These impairments are functionally limiting pt's ability to reach, lift, perform ADLs, sleep, carry, push, and pull.  Bhargav would benefit from skilled Physical Therapy to address the above impairments to relieve pain and return to prior level of function.      Precautions:  DM   OBJECTIVE:   Observation: pt ambulates into clinic independently; pt reports sling is in waiting room    Sensation: no complaints of altered sensation    AROM:   Shoulder ROM (supine):  Shoulder flx R 25 deg, L 154 deg  Shoulder abd: R 75 deg, L 179 deg  Shoulder ER: R 20 deg, L 72 deg  Shoulder IR: R 60 deg, L 90 deg    Strength/MMT:   Shoulder flx: R not tested, L 5/5   Shoulder abd: R not tested,  L  5/5  Shoulder ER: R not tested, L 5/5  Biceps: R not tested, L 5/5  Triceps: R not tested, L 5/5    Posture: good sitting posture    Gait: pt ambulates into clinic independently    Today’s Treatment and Response:  Patient education provided on PT eval findings, treatment plan, goals, HEP  Patient received there ex, HEP, ice x 5 minutes to right shoulder at end of session  Charges: PT Eval, TE 2      Total Timed Treatment: 35 min     Total Treatment Time: 40 min      PT Eval Low Complexity     PLAN OF CARE:    Goals:    1- Pt will be I with maintenance and progression of HEP  2- Pt will demo increase in R shoulder ROM to equal to L ease ability for pt to reach  3- Pt will demo increase in RUE strength to 4+/5 or better to ease ability for pt to lift  4- Pt will report decrease in R shoulder pain to 1/10 pain or less to ease ability for pt to perform ADLs    Frequency / Duration: Patient will be seen for 2x/week or a total of 8 visits over a 90 day period. Treatment will include: Modalities prn, manual therapy, therapeutic exercises, therapeutic activity, and instructions on a home program.     Education or treatment limitation: None  Rehab Potential:good    Patient was advised of these findings, precautions, and treatment options and has agreed to actively participate in planning and for this course of care.    Thank you for your referral. Please co-sign or sign and return this letter via fax as soon as possible to [phone]. If you have any questions, please contact me at Dept: [phone]    Sincerely,  Electronically signed by therapist: Marybeth Lopez PT, DPT    Physician's certification required: Yes  I certify the need for these services furnished under this plan of treatment and while under my care.    X___________________________________________________ Date____________________    Certification From: 1/11/2024  To:4/10/2024

## 2024-01-16 ENCOUNTER — OFFICE VISIT (OUTPATIENT)
Dept: PHYSICAL THERAPY | Age: 69
End: 2024-01-16
Attending: PHYSICIAN ASSISTANT
Payer: MEDICARE

## 2024-01-16 ENCOUNTER — APPOINTMENT (OUTPATIENT)
Dept: PHYSICAL THERAPY | Age: 69
End: 2024-01-16
Attending: PHYSICIAN ASSISTANT
Payer: MEDICARE

## 2024-01-16 PROCEDURE — 97110 THERAPEUTIC EXERCISES: CPT

## 2024-01-16 NOTE — PROGRESS NOTES
Diagnosis: Aftercare following surgery (Z48.89)  Status post right rotator cuff repair (Z98.890)          Next MD visit: 1/22/2024    Fall Risk: standard         Precautions: sling  Date of Surgery: 12/14/2023          Medication Changes since last visit?: No    Subjective: Pt reports 4-5/10 shoulder pain today with tylenol.  He reports his shoulder is feeling about the same.        Objective:    1/16/2024:  Shoulder PROM (supine):  Flx: 120 deg  Abd: 90 deg  ER: 30 deg     Date: 1/16/2024  Visit #: 2/8 ([employer]) exp. 4/11/2024   HEP      Therapeutic Exercise   - supine R shoulder PROM into flx/abd/ER & R elbow PROM flx/ext x 15 minutes  - supine R/L c-rotation 10x ea  - supine c-retraction 10x   25 minutes   Manual Therapy   -   Therapeutic Activity   -   Modalities   - heat pad to right shoulder in sitting x 5 minutes at start of session  - ice to right shoulder in supine x 5 minutes             Goals:  1- Pt will be I with maintenance and progression of HEP  2- Pt will demo increase in R shoulder ROM to equal to L ease ability for pt to reach  3- Pt will demo increase in RUE strength to 4+/5 or better to ease ability for pt to lift  4- Pt will report decrease in R shoulder pain to 1/10 pain or less to ease ability for pt to perform ADLs    Assessment: Session focused on gentle PROM R shoulder stretching per protocol.      Plan: continue PT    Charges: Ex 2       Total Timed Treatment: 25 min  Total Treatment Time: 35 min

## 2024-01-17 ENCOUNTER — OFFICE VISIT (OUTPATIENT)
Dept: INTERNAL MEDICINE CLINIC | Facility: CLINIC | Age: 69
End: 2024-01-17
Payer: MEDICARE

## 2024-01-17 ENCOUNTER — LAB ENCOUNTER (OUTPATIENT)
Dept: LAB | Facility: REFERENCE LAB | Age: 69
End: 2024-01-17
Attending: INTERNAL MEDICINE
Payer: MEDICARE

## 2024-01-17 VITALS
TEMPERATURE: 97 F | DIASTOLIC BLOOD PRESSURE: 72 MMHG | BODY MASS INDEX: 32.54 KG/M2 | SYSTOLIC BLOOD PRESSURE: 120 MMHG | WEIGHT: 202.5 LBS | OXYGEN SATURATION: 97 % | HEART RATE: 74 BPM | HEIGHT: 66 IN

## 2024-01-17 DIAGNOSIS — E78.2 MIXED HYPERLIPIDEMIA: ICD-10-CM

## 2024-01-17 DIAGNOSIS — E11.65 TYPE 2 DIABETES MELLITUS WITH HYPERGLYCEMIA, WITHOUT LONG-TERM CURRENT USE OF INSULIN (HCC): ICD-10-CM

## 2024-01-17 DIAGNOSIS — Z12.5 ENCOUNTER FOR SCREENING FOR MALIGNANT NEOPLASM OF PROSTATE: ICD-10-CM

## 2024-01-17 DIAGNOSIS — Z13.89 NEPHROPATHY SCREEN: ICD-10-CM

## 2024-01-17 DIAGNOSIS — H43.393 FLOATER, VITREOUS, BILATERAL: ICD-10-CM

## 2024-01-17 DIAGNOSIS — R31.29 MICROSCOPIC HEMATURIA: ICD-10-CM

## 2024-01-17 DIAGNOSIS — H52.4 HYPEROPIA WITH ASTIGMATISM AND PRESBYOPIA, BILATERAL: ICD-10-CM

## 2024-01-17 DIAGNOSIS — H25.13 AGE-RELATED NUCLEAR CATARACT OF BOTH EYES: ICD-10-CM

## 2024-01-17 DIAGNOSIS — H52.203 HYPEROPIA WITH ASTIGMATISM AND PRESBYOPIA, BILATERAL: ICD-10-CM

## 2024-01-17 DIAGNOSIS — L43.9 LICHEN PLANUS: ICD-10-CM

## 2024-01-17 DIAGNOSIS — I10 ESSENTIAL HYPERTENSION: ICD-10-CM

## 2024-01-17 DIAGNOSIS — Z00.00 ENCOUNTER FOR ANNUAL HEALTH EXAMINATION: ICD-10-CM

## 2024-01-17 DIAGNOSIS — Z86.008 HISTORY OF CARCINOMA IN SITU OF BLADDER: ICD-10-CM

## 2024-01-17 DIAGNOSIS — H52.03 HYPEROPIA WITH ASTIGMATISM AND PRESBYOPIA, BILATERAL: ICD-10-CM

## 2024-01-17 DIAGNOSIS — Z00.00 MEDICARE ANNUAL WELLNESS VISIT, SUBSEQUENT: Primary | ICD-10-CM

## 2024-01-17 LAB
ALBUMIN SERPL-MCNC: 4.5 G/DL (ref 3.2–4.8)
ALBUMIN/GLOB SERPL: 1.6 {RATIO} (ref 1–2)
ALP LIVER SERPL-CCNC: 64 U/L
ALT SERPL-CCNC: 17 U/L
ANION GAP SERPL CALC-SCNC: 7 MMOL/L (ref 0–18)
AST SERPL-CCNC: 16 U/L (ref ?–34)
BASOPHILS # BLD AUTO: 0.03 X10(3) UL (ref 0–0.2)
BASOPHILS NFR BLD AUTO: 0.6 %
BILIRUB SERPL-MCNC: 0.6 MG/DL (ref 0.2–1.1)
BILIRUB UR QL: NEGATIVE
BUN BLD-MCNC: 8 MG/DL (ref 9–23)
BUN/CREAT SERPL: 9.6 (ref 10–20)
CALCIUM BLD-MCNC: 9.7 MG/DL (ref 8.7–10.4)
CHLORIDE SERPL-SCNC: 105 MMOL/L (ref 98–112)
CO2 SERPL-SCNC: 29 MMOL/L (ref 21–32)
COMPLEXED PSA SERPL-MCNC: 0.72 NG/ML (ref ?–4)
CREAT BLD-MCNC: 0.83 MG/DL
DEPRECATED RDW RBC AUTO: 39.6 FL (ref 35.1–46.3)
EGFRCR SERPLBLD CKD-EPI 2021: 95 ML/MIN/1.73M2 (ref 60–?)
EOSINOPHIL # BLD AUTO: 0.13 X10(3) UL (ref 0–0.7)
EOSINOPHIL NFR BLD AUTO: 2.4 %
ERYTHROCYTE [DISTWIDTH] IN BLOOD BY AUTOMATED COUNT: 12.4 % (ref 11–15)
EST. AVERAGE GLUCOSE BLD GHB EST-MCNC: 154 MG/DL (ref 68–126)
FASTING STATUS PATIENT QL REPORTED: YES
GLOBULIN PLAS-MCNC: 2.9 G/DL (ref 2.8–4.4)
GLUCOSE BLD-MCNC: 136 MG/DL (ref 70–99)
GLUCOSE UR-MCNC: NORMAL MG/DL
HBA1C MFR BLD: 7 % (ref ?–5.7)
HCT VFR BLD AUTO: 42.3 %
HGB BLD-MCNC: 14.6 G/DL
HGB UR QL STRIP.AUTO: NEGATIVE
IMM GRANULOCYTES # BLD AUTO: 0.03 X10(3) UL (ref 0–1)
IMM GRANULOCYTES NFR BLD: 0.6 %
KETONES UR-MCNC: NEGATIVE MG/DL
LEUKOCYTE ESTERASE UR QL STRIP.AUTO: NEGATIVE
LYMPHOCYTES # BLD AUTO: 2.16 X10(3) UL (ref 1–4)
LYMPHOCYTES NFR BLD AUTO: 39.8 %
MCH RBC QN AUTO: 30.3 PG (ref 26–34)
MCHC RBC AUTO-ENTMCNC: 34.5 G/DL (ref 31–37)
MCV RBC AUTO: 87.8 FL
MONOCYTES # BLD AUTO: 0.44 X10(3) UL (ref 0.1–1)
MONOCYTES NFR BLD AUTO: 8.1 %
NEUTROPHILS # BLD AUTO: 2.64 X10 (3) UL (ref 1.5–7.7)
NEUTROPHILS # BLD AUTO: 2.64 X10(3) UL (ref 1.5–7.7)
NEUTROPHILS NFR BLD AUTO: 48.5 %
NITRITE UR QL STRIP.AUTO: NEGATIVE
OSMOLALITY SERPL CALC.SUM OF ELEC: 292 MOSM/KG (ref 275–295)
PH UR: 6.5 [PH] (ref 5–8)
PLATELET # BLD AUTO: 187 10(3)UL (ref 150–450)
POTASSIUM SERPL-SCNC: 4.3 MMOL/L (ref 3.5–5.1)
PROT SERPL-MCNC: 7.4 G/DL (ref 5.7–8.2)
PROT UR-MCNC: NEGATIVE MG/DL
RBC # BLD AUTO: 4.82 X10(6)UL
SODIUM SERPL-SCNC: 141 MMOL/L (ref 136–145)
SP GR UR STRIP: 1.02 (ref 1–1.03)
UROBILINOGEN UR STRIP-ACNC: NORMAL
WBC # BLD AUTO: 5.4 X10(3) UL (ref 4–11)

## 2024-01-17 PROCEDURE — 96160 PT-FOCUSED HLTH RISK ASSMT: CPT | Performed by: INTERNAL MEDICINE

## 2024-01-17 PROCEDURE — 1125F AMNT PAIN NOTED PAIN PRSNT: CPT | Performed by: INTERNAL MEDICINE

## 2024-01-17 PROCEDURE — G0439 PPPS, SUBSEQ VISIT: HCPCS | Performed by: INTERNAL MEDICINE

## 2024-01-17 PROCEDURE — 85025 COMPLETE CBC W/AUTO DIFF WBC: CPT | Performed by: INTERNAL MEDICINE

## 2024-01-17 PROCEDURE — 99499 UNLISTED E&M SERVICE: CPT | Performed by: INTERNAL MEDICINE

## 2024-01-17 PROCEDURE — 81001 URINALYSIS AUTO W/SCOPE: CPT

## 2024-01-17 PROCEDURE — 81015 MICROSCOPIC EXAM OF URINE: CPT

## 2024-01-17 PROCEDURE — 3078F DIAST BP <80 MM HG: CPT | Performed by: INTERNAL MEDICINE

## 2024-01-17 PROCEDURE — 3008F BODY MASS INDEX DOCD: CPT | Performed by: INTERNAL MEDICINE

## 2024-01-17 PROCEDURE — 3074F SYST BP LT 130 MM HG: CPT | Performed by: INTERNAL MEDICINE

## 2024-01-17 PROCEDURE — 36415 COLL VENOUS BLD VENIPUNCTURE: CPT

## 2024-01-17 PROCEDURE — 99213 OFFICE O/P EST LOW 20 MIN: CPT | Performed by: INTERNAL MEDICINE

## 2024-01-17 PROCEDURE — 3051F HG A1C>EQUAL 7.0%<8.0%: CPT | Performed by: INTERNAL MEDICINE

## 2024-01-17 PROCEDURE — 1170F FXNL STATUS ASSESSED: CPT | Performed by: INTERNAL MEDICINE

## 2024-01-17 PROCEDURE — 80053 COMPREHEN METABOLIC PANEL: CPT | Performed by: INTERNAL MEDICINE

## 2024-01-17 PROCEDURE — 83036 HEMOGLOBIN GLYCOSYLATED A1C: CPT | Performed by: INTERNAL MEDICINE

## 2024-01-19 ENCOUNTER — OFFICE VISIT (OUTPATIENT)
Dept: PHYSICAL THERAPY | Age: 69
End: 2024-01-19
Attending: PHYSICIAN ASSISTANT
Payer: MEDICARE

## 2024-01-19 PROCEDURE — 97110 THERAPEUTIC EXERCISES: CPT

## 2024-01-19 PROCEDURE — 97140 MANUAL THERAPY 1/> REGIONS: CPT

## 2024-01-19 NOTE — PROGRESS NOTES
Diagnosis: Aftercare following surgery (Z48.89)  Status post right rotator cuff repair (Z98.890)          Next MD visit: 1/22/2024    Fall Risk: standard         Precautions: sling  Date of Surgery: 12/14/2023          Medication Changes since last visit?: No    Subjective: Pt reports 3-4/10 shoulder pain without tylenol today.      Objective:    1/16/2024:  Shoulder PROM (supine):  Flx: 120 deg  Abd: 90 deg  ER: 30 deg    1/19/2023:  Shoulder PROM (supine):  Flx: R 125 deg  Abd:  ER: 50 deg     Date: 1/19/2024  Visit #: 3/8 ([employer]) exp. 4/11/2024 Date: 1/16/2024  Visit #: 2/8 ([employer]) exp. 4/11/2024   HEP       Therapeutic Exercise   - supine R shoulder PROM into flx/abd/ER & R elbow PROM flx/ext x 15 minutes  - standing R shoulder pendulums AP, ML, CW/CCW 30x ea  25 minutes - supine R shoulder PROM into flx/abd/ER & R elbow PROM flx/ext x 15 minutes  - supine R/L c-rotation 10x ea  - supine c-retraction 10x   25 minutes   Manual Therapy   - STM to R upper trap, rhomboids, teres minor/major, surrounding medial/lateral scapular border x 8 minutes -   Therapeutic Activity    -   Modalities   - heat pad to right shoulder in sitting x 5 minutes at start of session  - ice to right shoulder in sitting x 5 minutes - heat pad to right shoulder in sitting x 5 minutes at start of session  - ice to right shoulder in supine x 5 minutes              Goals:  1- Pt will be I with maintenance and progression of HEP  2- Pt will demo increase in R shoulder ROM to equal to L ease ability for pt to reach  3- Pt will demo increase in RUE strength to 4+/5 or better to ease ability for pt to lift  4- Pt will report decrease in R shoulder pain to 1/10 pain or less to ease ability for pt to perform ADLs    Assessment: Patient displaying improved R shoulder PROM again this session.  Initiated manual techniques to reduce any tension throughout scapular/shoulder musculature.       Plan: continue PT    Charges: Ex 2 Man 1       Total Timed Treatment: 33 min  Total Treatment Time: 43 min

## 2024-01-22 ENCOUNTER — OFFICE VISIT (OUTPATIENT)
Dept: ORTHOPEDICS CLINIC | Facility: CLINIC | Age: 69
End: 2024-01-22
Payer: MEDICARE

## 2024-01-22 VITALS — WEIGHT: 202 LBS | HEIGHT: 66 IN | BODY MASS INDEX: 32.47 KG/M2

## 2024-01-22 DIAGNOSIS — Z98.890 STATUS POST RIGHT ROTATOR CUFF REPAIR: ICD-10-CM

## 2024-01-22 DIAGNOSIS — Z48.89 AFTERCARE FOLLOWING SURGERY: Primary | ICD-10-CM

## 2024-01-22 PROCEDURE — 99024 POSTOP FOLLOW-UP VISIT: CPT | Performed by: ORTHOPAEDIC SURGERY

## 2024-01-22 PROCEDURE — 1160F RVW MEDS BY RX/DR IN RCRD: CPT | Performed by: ORTHOPAEDIC SURGERY

## 2024-01-22 PROCEDURE — 3008F BODY MASS INDEX DOCD: CPT | Performed by: ORTHOPAEDIC SURGERY

## 2024-01-22 PROCEDURE — 1159F MED LIST DOCD IN RCRD: CPT | Performed by: ORTHOPAEDIC SURGERY

## 2024-01-22 PROCEDURE — 1125F AMNT PAIN NOTED PAIN PRSNT: CPT | Performed by: ORTHOPAEDIC SURGERY

## 2024-01-22 RX ORDER — ACETAMINOPHEN 500 MG
1000 TABLET ORAL EVERY 6 HOURS PRN
COMMUNITY

## 2024-01-22 NOTE — PROGRESS NOTES
[facility] Post-op Progress Note      Date of Surgery:  12/19/23    History:  The patient is a 68 year old male who presents for postop follow-up after undergoing arthroscopy with rotator cuff repair.  The patient is tolerating physical therapy well with only mild soreness, responsive to icing and over-the-counter anti-inflammatory use.  No new symptoms are reported.  This weaned out of the sling.    Physical Exam:  On examination the patient has a benign appearing post-operative right shoulder.  There is improving passive range of motion with no signs of significant contractures.  Passive flexion is estimated at 100, abduction to 70 and external rotation to 40.  Hand, wrist, and elbow range of motion is within normal limits.  Distal neurovascular exam is intact.    Assessment:  Status post right shoulder arthroscopy with rotator cuff repair, decompression, distal claviculectomy and debridement    Plan:   The patient is recovering well status post arthroscopy.  We recommend continuing physical therapy to achieve terminal passive range of motion over the next 4 to 6 weeks.  The importance of supplementing with a home stretching program was emphasized.  Active assisted range of motion followed by active range can be incorporated once passive range of motion is normalizing.  Rotator cuff strengthening will be addressed once active motion is full, usually at 10-12 weeks postop.  Periscapular and distal strengthening may be incorporated as tolerated.  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  Follow-up check is recommended in about 4 to 6 weeks or sooner as needed.  All questions were answered and the patient and his wife expressed understanding and appreciation.    Lila SCHWARZ  MD Chele, FAAOS  Sports Medicine/Knee and Shoulder  [facility]  Phone [phone]  Fax [phone]      This document was partially prepared using Dragon [employer] voice recognition software.  Although every attempt is made to correct errors during dictation, discrepancies may still exist.

## 2024-01-22 NOTE — PROGRESS NOTES
Subjective:   Bhargav Love is a 68 year old male who presents for a MA (Medicare Advantage) Supervisit (Once per calendar year) and scheduled follow up of multiple significant but stable problems.       History/Other:   Fall Risk Assessment:   He has been screened for Falls and is High Risk. Fall Prevention information provided to patient in After Visit Summary.    Do you feel unsteady when standing or walking?: No  Do you worry about falling?: No  Have you fallen in the past year?: Yes  How many times have you fallen?: 1  Were you injured?: Yes     Cognitive Assessment:   He had a completely normal cognitive assessment - see flowsheet entries     Functional Ability/Status:   Bhargav Love has some abnormal functions as listed below:  He has Dressing and/or Bathing issues based on screening of functional status.  Difficulty dressing or bathing?: Yes  Bathing or Showering: Able without help  Dressing: Need some help  He has Driving difficulties based on screening of functional status. He has Meal Preparation difficulties based on screening of functional status.He has difficulties Managing Money/Bills based on screening of functional status.He has difficulties Shopping for Groceries based on screening of functional status.       Depression Screening (PHQ-2/PHQ-9): PHQ-2 SCORE: 0, done 1/17/2024          Advanced Directives:   He does NOT have a Living Will. [Do you have a living will?: No]  He does NOT have a Power of  for Health Care. [Do you have a healthcare power of ?: No]  Not discussed      Patient Active Problem List   Diagnosis    Lichen planus    History of carcinoma in situ of bladder    Age-related nuclear cataract of both eyes    Floater, vitreous, bilateral    Hyperopia with astigmatism and presbyopia, bilateral    Essential hypertension    Hyperlipidemia    Type 2 diabetes mellitus with hyperglycemia (HCC)     Allergies:  He has No Known Allergies.    Current  Medications:  Outpatient Medications Marked as Taking for the 1/17/24 encounter (Office Visit) with Wali Patricia MD   Medication Sig    losartan 25 MG Oral Tab Take 1 tablet (25 mg total) by mouth daily.    HYDROcodone-acetaminophen (NORCO) 5-325 MG Oral Tab Take 1 tablet by mouth every 4 (four) hours as needed for Pain. (Patient not taking: Reported on 1/22/2024)    metFORMIN HCl 1000 MG Oral Tab TOME 1 TABLETA DOS VECES AL MICHAEL CON LAS COMIDAS    simvastatin 20 MG Oral Tab Take 1 tablet (20 mg total) by mouth nightly.       Medical History:  He  has a past medical history of Essential hypertension, History of carcinoma in situ of bladder, Hyperlipidemia, Type 2 diabetes mellitus without complication, without long-term current use of insulin (HCC), and Visual impairment.  Surgical History:  He  has a past surgical history that includes other and colonoscopy (N/A, 4/19/2018).   Family History:  His family history includes Cancer in his father; Cataracts in his sister; Diabetes in his mother.  Social History:  He  reports that he has never smoked. He has never used smokeless tobacco. He reports current alcohol use. He reports that he does not use drugs.    Tobacco:  He has never smoked tobacco.    CAGE Alcohol Screen:   CAGE screening score of 0 on 1/17/2024, showing low risk of alcohol abuse.      Patient Care Team:  Wali Patricia MD as PCP - General (Internal Medicine)  Lila Elaine MD (SURGERY, ORTHOPEDIC)    Review of Systems  GENERAL: feels well otherwise  SKIN: denies any unusual skin lesions  EYES: denies blurred vision or double vision  HEENT: denies nasal congestion, sinus pain or ST  LUNGS: denies shortness of breath with exertion  CARDIOVASCULAR: denies chest pain on exertion  GI: denies abdominal pain, denies heartburn  : 0 per night nocturia, no complaint of urinary incontinence  MUSCULOSKELETAL: denies back pain  NEURO: denies headaches  PSYCHE: denies depression or anxiety  HEMATOLOGIC:  denies hx of anemia  ENDOCRINE: denies thyroid history  ALL/ASTHMA: denies hx of allergy or asthma    Objective:   Physical Exam  General Appearance:  Alert, cooperative, no distress, appears stated age   Head:  Normocephalic, without obvious abnormality, atraumatic   Eyes:  PERRL, conjunctiva/corneas clear, EOM's intact, both eyes   Ears:  Normal TM's and external ear canals, both ears   Nose: Nares normal, septum midline, mucosa normal, no drainage or sinus tenderness   Throat: Lips, mucosa, and tongue normal; teeth and gums normal   Neck: Supple, symmetrical, trachea midline, no adenopathy, thyroid: not enlarged, symmetric, no tenderness/mass/nodules, no carotid bruit or JVD   Back:   Symmetric, no curvature, ROM normal, no CVA tenderness   Lungs:   Clear to auscultation bilaterally, respirations unlabored   Chest Wall:  No tenderness or deformity   Heart:  Regular rate and rhythm, S1, S2 normal, no murmur, rub or gallop   Abdomen:   Soft, non-tender, bowel sounds active all four quadrants,  no masses, no organomegaly   Genitalia: Normal male   Rectal: Normal tone, normal prostate, no masses or tenderness   Extremities: Extremities normal, atraumatic, no cyanosis or edema   Pulses: 2+ and symmetric   Skin: Skin color, texture, turgor normal, no rashes or lesions   Lymph nodes: Cervical, supraclavicular, and axillary nodes normal   Neurologic: Normal     Bilateral barefoot skin diabetic exam is normal, visualized feet and the appearance is normal.  Bilateral monofilament/sensation of both feet is normal.  Pulsation pedal pulse exam of both lower legs/feet is normal as well.      /72   Pulse 74   Temp 97.4 °F (36.3 °C)   Ht 5' 6\" (1.676 m)   Wt 202 lb 8 oz (91.9 kg)   SpO2 97%   BMI 32.68 kg/m²  Estimated body mass index is 32.68 kg/m² as calculated from the following:    Height as of this encounter: 5' 6\" (1.676 m).    Weight as of this encounter: 202 lb 8 oz (91.9 kg).    Medicare Hearing Assessment:    Hearing Screening    Screening Method: Finger Rub, Whisper Test  Finger Rub Result: Pass  Whisper Test Result: Pass               Visual Acuity:   Right Eye Visual Acuity: Corrected Right Eye Chart Acuity: 20/20   Left Eye Visual Acuity: Corrected Left Eye Chart Acuity: 20/20   Both Eyes Visual Acuity: Corrected Both Eyes Chart Acuity: 20/20   Able To Tolerate Visual Acuity: Yes        Assessment & Plan:   Bhargav Love is a 68 year old male who presents for a Medicare Assessment.     1. Medicare annual wellness visit, subsequent (Primary)  -     CBC With Differential With Platelet  -     Comp Metabolic Panel (14)  -     During the annual physical exam I spent extensive time discussing medical history including existing conditions, past surgeries, allergies and medications.  Lifestyle factors discussed including diet, exercise and substance abuse including alcohol and tobacco if warranted.  Counseled on screening tests based on age and underlying risk factors which may include but not limited to blood pressure measurement, cholesterol screening, diabetes screening and prostate cancer screening.  Preventative screenings discussed.  Reviewed immunizations.  Sexual health discussed if appropriate.  Family history reviewed.  I addressed any specific concerns or symptoms that the patient had.      2. Type 2 diabetes mellitus with hyperglycemia, without long-term current use of insulin (HCC)  -     Hemoglobin A1C  -      On Metformin.      3. Essential hypertension       -    Controlled.  Take losartan.      4. Mixed hyperlipidemia       -   On simvastatin.      5. History of carcinoma in situ of bladder  -     Urinalysis, Routine    6. Age-related nuclear cataract of both eyes       -     Follow with ophthalmology.      7. Floater, vitreous, bilateral       -   Follow with ophthalmology     8. Hyperopia with astigmatism and presbyopia, bilateral       -   Follow with ophthalmology.      9. Lichen planus       -    Stable.      10. Nephropathy screen  -     UA Microscopic only, urine; Future; Expected date: 01/17/2024    11. Encounter for screening for malignant neoplasm of prostate  -     PSA Total, Screen; Future; Expected date: 01/17/2024    12. Encounter for annual health examination    The patient indicates understanding of these issues and agrees to the plan.  Reinforced healthy diet, lifestyle, and exercise.      Return in about 6 months (around 7/17/2024) for Symptom monitoring.     Wali Patricia MD, 1/22/2024     Supplementary Documentation:   General Health:  In the past six months, have you lost more than 10 pounds without trying?: 3 - Don't know  Has your appetite been poor?: No  Type of Diet: Balanced  How does the patient maintain a good energy level?: Daily Walks  How would you describe your daily physical activity?: None  How would you describe your current health state?: Fair  How do you maintain positive mental well-being?: Visiting Friends  On a scale of 0 to 10, with 0 being no pain and 10 being severe pain, what is your pain level?: 4 - (Moderate)  In the past six months, have you experienced urine leakage?: 0-No  At any time do you feel concerned for the safety/well-being of yourself and/or your children, in your home or elsewhere?: Yes  Have you had any immunizations at another office such as Influenza, Hepatitis B, Tetanus, or Pneumococcal?: No        Bhargav Love's SCREENING SCHEDULE   Tests on this list are recommended by your physician but may not be covered, or covered at this frequency, by your insurer.   Please check with your insurance carrier before scheduling to verify coverage.   PREVENTATIVE SERVICES FREQUENCY &  COVERAGE DETAILS LAST COMPLETION DATE   Diabetes Screening    Fasting Blood Sugar / Glucose    One screening every 12 months if never tested or if previously tested but not diagnosed with pre-diabetes   One screening every 6 months if diagnosed with pre-diabetes Lab Results    Component Value Date     (H) 01/17/2024        Cardiovascular Disease Screening    Lipid Panel  Cholesterol  Lipoprotein (HDL)  Triglycerides Covered every 5 years for all Medicare beneficiaries without apparent signs or symptoms of cardiovascular disease Lab Results   Component Value Date    CHOLEST 134 01/05/2023    HDL 66 (H) 01/05/2023    LDL 48 01/05/2023    TRIG 112 01/05/2023         Electrocardiogram (EKG)   Covered if needed at Welcome to Medicare, and non-screening if indicated for medical reasons 12/12/2023      Ultrasound Screening for Abdominal Aortic Aneurysm (AAA) Covered once in a lifetime for one of the following risk factors    Men who are 65-75 years old and have ever smoked    Anyone with a family history -     Colorectal Cancer Screening  Covered for ages 50-85; only need ONE of the following:    Colonoscopy   Covered every 10 years    Covered every 2 years if patient is at high risk or previous colonoscopy was abnormal 04/19/2018    Health Maintenance   Topic Date Due    Colorectal Cancer Screening  04/19/2028       Flexible Sigmoidoscopy   Covered every 4 years -    Fecal Occult Blood Test Covered annually -   Prostate Cancer Screening    Prostate-Specific Antigen (PSA) Annually Lab Results   Component Value Date    PSA 0.6 01/19/2018     Health Maintenance   Topic Date Due    PSA  01/17/2026      Immunizations    Influenza Covered once per flu season  Please get every year -  Influenza Vaccine(1) due on 10/01/2023    Pneumococcal Each vaccine ([id number] & Sgcnogysm00) covered once after 65 Prevnar 13: 10/13/2020    [id number]: 07/16/2019     Pneumococcal Vaccination(3 of 3 - PPSV23 or PCV20) due on 07/16/2024    Hepatitis B One screening covered for patients with certain risk factors   03/18/2016  No recommendations at this time    Tetanus Toxoid Not covered by Medicare Part B unless medically necessary (cut with metal); may be covered with your pharmacy prescription benefits -     Tetanus, Diptheria and Pertusis TD and TDaP Not covered by Medicare Part B -  No recommendations at this time    Zoster Not covered by Medicare Part B; may be covered with your pharmacy  prescription benefits -  No recommendations at this time     Diabetes      Hemoglobin A1C Annually; if last result is elevated, may be asked to retest more frequently.    Medicare covers every 3 months Lab Results   Component Value Date     (H) 01/17/2024    A1C 7.0 (H) 01/17/2024       No recommendations at this time    Creat/alb ratio Annually Lab Results   Component Value Date    MICROALBCREA 47.4 (H) 01/05/2023       LDL Annually Lab Results   Component Value Date    LDL 48 01/05/2023       Dilated Eye Exam Annually Last Diabetic Eye Exam:  Last Dilated Eye Exam: 05/04/23  Eye Exam shows Diabetic Retinopathy?: No       Annual Monitoring of Persistent Medications (ACE/ARB, digoxin diuretics, anticonvulsants)    Potassium Annually Lab Results   Component Value Date    K 4.3 01/17/2024         Creatinine   Annually Lab Results   Component Value Date    CREATSERUM 0.83 01/17/2024         BUN Annually Lab Results   Component Value Date    BUN 8 (L) 01/17/2024       Drug Serum Conc Annually No results found for: \"DIGOXIN\", \"DIG\", \"VALP\"

## 2024-01-22 NOTE — PATIENT INSTRUCTIONS
Bhargav Love's SCREENING SCHEDULE   Tests on this list are recommended by your physician but may not be covered, or covered at this frequency, by your insurer.   Please check with your insurance carrier before scheduling to verify coverage.   PREVENTATIVE SERVICES FREQUENCY &  COVERAGE DETAILS LAST COMPLETION DATE   Diabetes Screening    Fasting Blood Sugar / Glucose    One screening every 12 months if never tested or if previously tested but not diagnosed with pre-diabetes   One screening every 6 months if diagnosed with pre-diabetes Lab Results   Component Value Date     (H) 01/17/2024        Cardiovascular Disease Screening    Lipid Panel  Cholesterol  Lipoprotein (HDL)  Triglycerides Covered every 5 years for all Medicare beneficiaries without apparent signs or symptoms of cardiovascular disease Lab Results   Component Value Date    CHOLEST 134 01/05/2023    HDL 66 (H) 01/05/2023    LDL 48 01/05/2023    TRIG 112 01/05/2023         Electrocardiogram (EKG)   Covered if needed at Welcome to Medicare, and non-screening if indicated for medical reasons 12/12/2023      Ultrasound Screening for Abdominal Aortic Aneurysm (AAA) Covered once in a lifetime for one of the following risk factors   • Men who are 65-75 years old and have ever smoked   • Anyone with a family history -     Colorectal Cancer Screening  Covered for ages 50-85; only need ONE of the following:    Colonoscopy   Covered every 10 years    Covered every 2 years if patient is at high risk or previous colonoscopy was abnormal 04/19/2018    Health Maintenance   Topic Date Due   • Colorectal Cancer Screening  04/19/2028       Flexible Sigmoidoscopy   Covered every 4 years -    Fecal Occult Blood Test Covered annually -   Prostate Cancer Screening    Prostate-Specific Antigen (PSA) Annually Lab Results   Component Value Date    PSA 0.6 01/19/2018     Health Maintenance   Topic Date Due   • PSA  01/17/2026      Immunizations    Influenza Covered  once per flu season  Please get every year -  Influenza Vaccine(1) due on 10/01/2023    Pneumococcal Each vaccine (Ddkhjhq53 & Hqwrasgnb17) covered once after 65 Prevnar 13: 10/13/2020    Wdpsnzghc11: 07/16/2019     Pneumococcal Vaccination(3 of 3 - PPSV23 or PCV20) due on 07/16/2024    Hepatitis B One screening covered for patients with certain risk factors   03/18/2016  No recommendations at this time    Tetanus Toxoid Not covered by Medicare Part B unless medically necessary (cut with metal); may be covered with your pharmacy prescription benefits -    Tetanus, Diptheria and Pertusis TD and TDaP Not covered by Medicare Part B -  No recommendations at this time    Zoster Not covered by Medicare Part B; may be covered with your pharmacy  prescription benefits -  No recommendations at this time     Diabetes      Hemoglobin A1C Annually; if last result is elevated, may be asked to retest more frequently.    Medicare covers every 3 months Lab Results   Component Value Date     (H) 01/17/2024    A1C 7.0 (H) 01/17/2024       No recommendations at this time    Creat/alb ratio Annually Lab Results   Component Value Date    MICROALBCREA 47.4 (H) 01/05/2023       LDL Annually Lab Results   Component Value Date    LDL 48 01/05/2023       Dilated Eye Exam Annually [unfilled]     Annual Monitoring of Persistent Medications (ACE/ARB, digoxin diuretics, anticonvulsants)    Potassium Annually Lab Results   Component Value Date    K 4.3 01/17/2024         Creatinine   Annually Lab Results   Component Value Date    CREATSERUM 0.83 01/17/2024         BUN Annually Lab Results   Component Value Date    BUN 8 (L) 01/17/2024       Drug Serum Conc Annually No results found for: \"DIGOXIN\", \"DIG\", \"VALP\"

## 2024-01-23 ENCOUNTER — OFFICE VISIT (OUTPATIENT)
Dept: PHYSICAL THERAPY | Age: 69
End: 2024-01-23
Attending: PHYSICIAN ASSISTANT
Payer: MEDICARE

## 2024-01-23 PROCEDURE — 97110 THERAPEUTIC EXERCISES: CPT

## 2024-01-23 NOTE — PROGRESS NOTES
Diagnosis: Aftercare following surgery (Z48.89)  Status post right rotator cuff repair (Z98.890)          Next MD visit: unknown    Fall Risk: standard         Precautions: surgical protocol  Date of Surgery: 12/14/2023          Medication Changes since last visit?: No    Subjective: Pt reports 3-4/10 right shoulder pain without medication.  Pt reports he saw the surgeon who discharged him from the sling and recommended continued PT.      Objective:    1/19/2024:  Shoulder PROM (supine):  Flx: R 125 deg  Abd: R 90 deg  ER: R 60 deg    1/23/2024:  Shoulder PROM (supine):  Flx: R 135 deg  Abd: R 120 deg  ER: R 60 deg     Date: 1/23/2024  Visit #: 4/8 ([employer]) exp. 4/11/2024 Date: 1/19/2024  Visit #: 3/8 ([employer]) exp. 4/11/2024 Date: 1/16/2024  Visit #: 2/8 ([employer]) exp. 4/11/2024   HEP        Therapeutic Exercise   - supine R shoulder PROM into flx/abd/ER & R elbow PROM flx/ext x 15 minutes  - standing R shoulder pendulums AP, ML, CW/CCW 30x ea  20 minutes - supine R shoulder PROM into flx/abd/ER & R elbow PROM flx/ext x 15 minutes  - standing R shoulder pendulums AP, ML, CW/CCW 30x ea  25 minutes - supine R shoulder PROM into flx/abd/ER & R elbow PROM flx/ext x 15 minutes  - supine R/L c-rotation 10x ea  - supine c-retraction 10x   25 minutes   Manual Therapy    - STM to R upper trap, rhomboids, teres minor/major, surrounding medial/lateral scapular border x 8 minutes -   Therapeutic Activity     -   Modalities   - heat pad to right shoulder in sitting x 5 minutes at start of session  - ice to right shoulder in sitting x 5 minutes - heat pad to right shoulder in sitting x 5 minutes at start of session  - ice to right shoulder in sitting x 5 minutes - heat pad to right shoulder in sitting x 5 minutes at start of session  - ice to right shoulder in supine x 5 minutes             Goals:  1- Pt will be I with maintenance and progression of HEP  2- Pt will demo increase in R shoulder ROM to  equal to L ease ability for pt to reach  3- Pt will demo increase in RUE strength to 4+/5 or better to ease ability for pt to lift  4- Pt will report decrease in R shoulder pain to 1/10 pain or less to ease ability for pt to perform ADLs    Assessment:  Session focused on continuation of PROM shoulder stretching.  Patient progressing with protocol.       Plan: continue PT - modify one session this week as pt already meeting ROM expectations of protocol    Charges: Ex 1    Total Timed Treatment: 20 min  Total Treatment Time: 30 min

## 2024-01-25 ENCOUNTER — APPOINTMENT (OUTPATIENT)
Dept: PHYSICAL THERAPY | Age: 69
End: 2024-01-25
Attending: PHYSICIAN ASSISTANT
Payer: MEDICARE

## 2024-01-30 ENCOUNTER — OFFICE VISIT (OUTPATIENT)
Dept: PHYSICAL THERAPY | Age: 69
End: 2024-01-30
Attending: PHYSICIAN ASSISTANT
Payer: MEDICARE

## 2024-01-30 PROCEDURE — 97110 THERAPEUTIC EXERCISES: CPT

## 2024-01-30 NOTE — PROGRESS NOTES
Diagnosis: Aftercare following surgery (Z48.89)  Status post right rotator cuff repair (Z98.890)          Next MD visit: unknown    Fall Risk: standard         Precautions: surgical protocol  Date of Surgery: 12/14/2023          Medication Changes since last visit?: No    Subjective: Pt reports 3/10 right shoulder pain without medication.  He reports his shoulder may be doing a little better.     Objective:    1/19/2024:  Shoulder PROM (supine):  Flx: R 125 deg  Abd: R 90 deg  ER: R 60 deg    1/23/2024:  Shoulder PROM (supine):  Flx: R 135 deg  Abd: R 120 deg  ER: R 60 deg     Date: 1/30/2024  Visit #: 5/8 ([employer]) exp. 4/11/2024 Date: 1/23/2024  Visit #: 4/8 ([employer]) exp. 4/11/2024 Date: 1/19/2024  Visit #: 3/8 ([employer]) exp. 4/11/2024   HEP        Therapeutic Exercise   - supine R shoulder PROM into flx/abd/ER & R elbow PROM flx/ext x 20 minutes  - seated cervical extension 10x  - seated R/L c-lateral flexion 10x ea  - seated R/L c-rotation 10x ea  - seated c-retraction 10x - supine R shoulder PROM into flx/abd/ER & R elbow PROM flx/ext x 15 minutes  - standing R shoulder pendulums AP, ML, CW/CCW 30x ea  20 minutes - supine R shoulder PROM into flx/abd/ER & R elbow PROM flx/ext x 15 minutes  - standing R shoulder pendulums AP, ML, CW/CCW 30x ea  25 minutes   Manual Therapy     - STM to R upper trap, rhomboids, teres minor/major, surrounding medial/lateral scapular border x 8 minutes   Therapeutic Activity        Modalities   - heat pad to right shoulder in sitting x 5 minutes at start of session  - declined need for ice  - heat pad to right shoulder in sitting x 5 minutes at start of session  - ice to right shoulder in sitting x 5 minutes - heat pad to right shoulder in sitting x 5 minutes at start of session  - ice to right shoulder in sitting x 5 minutes             Goals:  1- Pt will be I with maintenance and progression of HEP  2- Pt will demo increase in R shoulder ROM to equal to L  ease ability for pt to reach  3- Pt will demo increase in RUE strength to 4+/5 or better to ease ability for pt to lift  4- Pt will report decrease in R shoulder pain to 1/10 pain or less to ease ability for pt to perform ADLs    Assessment:  Session focused on continuation of PROM stretching per protocol and cervical stretches.    Plan: continue PT     Charges: Ex 2    Total Timed Treatment: 30 min  Total Treatment Time: 35 min

## 2024-02-01 ENCOUNTER — OFFICE VISIT (OUTPATIENT)
Dept: PHYSICAL THERAPY | Age: 69
End: 2024-02-01
Attending: PHYSICIAN ASSISTANT
Payer: MEDICARE

## 2024-02-01 PROCEDURE — 97110 THERAPEUTIC EXERCISES: CPT

## 2024-02-01 NOTE — PROGRESS NOTES
Diagnosis: Aftercare following surgery (Z48.89)  Status post right rotator cuff repair (Z98.890)          Next MD visit: unknown    Fall Risk: standard         Precautions: surgical protocol  Date of Surgery: 12/14/2023          Medication Changes since last visit?: No    Subjective: Pt reports 3/10 right shoulder pain without medication.  He reports sleeping is the most uncomfortable/painful.    Objective:    2/1/2024:  Shoulder PROM (supine):  Flx: R 140 deg  Abd: R 120 deg  ER: R 60 deg     Date: 2/1/2024  Visit #: 6/8 ([employer]) exp. 4/11/2024 Date: 1/30/2024  Visit #: 5/8 ([employer]) exp. 4/11/2024 Date: 1/23/2024  Visit #: 4/8 ([employer]) exp. 4/11/2024   HEP        Therapeutic Exercise   - supine R shoulder PROM into flx/abd/ER x 20 minutes - supine R shoulder PROM into flx/abd/ER & R elbow PROM flx/ext x 20 minutes  - seated cervical extension 10x  - seated R/L c-lateral flexion 10x ea  - seated R/L c-rotation 10x ea  - seated c-retraction 10x - supine R shoulder PROM into flx/abd/ER & R elbow PROM flx/ext x 15 minutes  - standing R shoulder pendulums AP, ML, CW/CCW 30x ea  20 minutes   Manual Therapy        Therapeutic Activity        Modalities    - heat pad to right shoulder in sitting x 5 minutes at start of session  - declined need for ice  - heat pad to right shoulder in sitting x 5 minutes at start of session  - ice to right shoulder in sitting x 5 minutes             Goals:  1- Pt will be I with maintenance and progression of HEP  2- Pt will demo increase in R shoulder ROM to equal to L ease ability for pt to reach  3- Pt will demo increase in RUE strength to 4+/5 or better to ease ability for pt to lift  4- Pt will report decrease in R shoulder pain to 1/10 pain or less to ease ability for pt to perform ADLs    Assessment:  Patient progressing well with surgical protocol.    Plan: continue PT - advance to AAROM/AROM per protocol next session    Charges: Ex 1    Total Timed  Treatment: 20 min  Total Treatment Time: 20 min

## 2024-02-06 ENCOUNTER — OFFICE VISIT (OUTPATIENT)
Dept: PHYSICAL THERAPY | Age: 69
End: 2024-02-06
Attending: PHYSICIAN ASSISTANT
Payer: MEDICARE

## 2024-02-06 PROCEDURE — 97110 THERAPEUTIC EXERCISES: CPT

## 2024-02-06 NOTE — PROGRESS NOTES
Diagnosis: Aftercare following surgery (Z48.89)  Status post right rotator cuff repair (Z98.890)          Next MD visit: unknown    Fall Risk: standard         Precautions: surgical protocol  Date of Surgery: 12/14/2023          Medication Changes since last visit?: No    Subjective: Pt reports 3/10 right shoulder pain without medication.     Objective:    2/1/2024:  Shoulder PROM (supine):  Flx: R 140 deg  Abd: R 120 deg  ER: R 60 deg     Date: 2/6/2024  Visit #: 7/8 ([employer]) exp. 4/11/2024 Date: 2/1/2024  Visit #: 6/8 ([employer]) exp. 4/11/2024 Date: 1/30/2024  Visit #: 5/8 ([employer]) exp. 4/11/2024   HEP   - updated HEP - see pt instructions section     Therapeutic Exercise   - supine R shoulder PROM into flx/abd/ER x 20 minutes  - sidelying R scapular elevation and depression 2 x 10  - sidelying R scapular protraction/retraction  - supine B shoulder flexion AAROM with wand 1 x 10  - supine B chest press with wand 1 x 10  - seated B towel slides on table 10x - supine R shoulder PROM into flx/abd/ER x 20 minutes - supine R shoulder PROM into flx/abd/ER & R elbow PROM flx/ext x 20 minutes  - seated cervical extension 10x  - seated R/L c-lateral flexion 10x ea  - seated R/L c-rotation 10x ea  - seated c-retraction 10x   Manual Therapy        Therapeutic Activity        Modalities     - heat pad to right shoulder in sitting x 5 minutes at start of session  - declined need for ice              Goals:  1- Pt will be I with maintenance and progression of HEP  2- Pt will demo increase in R shoulder ROM to equal to L ease ability for pt to reach  3- Pt will demo increase in RUE strength to 4+/5 or better to ease ability for pt to lift  4- Pt will report decrease in R shoulder pain to 1/10 pain or less to ease ability for pt to perform ADLs    Assessment: Initiated AAROM shoulder exercises this session and progressed PROM to full range per protocol.     Plan: continue PT - continue with AAROM and  trial AROM per protocol next session as tolaterated    Charges: Ex 2    Total Timed Treatment: 35 min  Total Treatment Time: 35 min

## 2024-02-08 ENCOUNTER — OFFICE VISIT (OUTPATIENT)
Dept: PHYSICAL THERAPY | Age: 69
End: 2024-02-08
Attending: PHYSICIAN ASSISTANT
Payer: MEDICARE

## 2024-02-08 PROCEDURE — 97110 THERAPEUTIC EXERCISES: CPT

## 2024-02-08 NOTE — PROGRESS NOTES
Diagnosis: Aftercare following surgery (Z48.89)  Status post right rotator cuff repair (Z98.890)          Next MD visit: unknown    Fall Risk: standard         Precautions: surgical protocol  Date of Surgery: 12/14/2023          Medication Changes since last visit?: No    Subjective: Pt reports 2-3/10 right shoulder pain without medication.     Objective:    2/1/2024:  Shoulder PROM (supine):  Flx: R 140 deg  Abd: R 120 deg  ER: R 60 deg     Date: 2/8/2024  Visit #: 8/8 ([employer]) exp. 4/11/2024 Date: 2/6/2024  Visit #: 7/8 ([employer]) exp. 4/11/2024 Date: 2/1/2024  Visit #: 6/8 ([employer]) exp. 4/11/2024   HEP    - updated HEP - see pt instructions section    Therapeutic Exercise   - supine R shoulder PROM into flx/abd/ER x 20 minutes  - sidelying R scapular elevation and depression 2 x 10  - sidelying R scapular protraction/retraction  - sidelying R shoulder ER AROM 10x   - supine B shoulder flexion AAROM with wand 1 x 10  - supine B chest press with wand 1 x 10  - supine B shoulder ER AROM 1 x 10  - sidelying R shoulder ER 1 x 10  - seated B towel slides on table 2 x 10  - standing B shoulder flexion wall wash 10x - supine R shoulder PROM into flx/abd/ER x 20 minutes  - sidelying R scapular elevation and depression 2 x 10  - sidelying R scapular protraction/retraction  - supine B shoulder flexion AAROM with wand 1 x 10  - supine B chest press with wand 1 x 10  - seated B towel slides on table 10x - supine R shoulder PROM into flx/abd/ER x 20 minutes   Manual Therapy        Therapeutic Activity        Modalities                  Goals:  1- Pt will be I with maintenance and progression of HEP  2- Pt will demo increase in R shoulder ROM to equal to L ease ability for pt to reach  3- Pt will demo increase in RUE strength to 4+/5 or better to ease ability for pt to lift  4- Pt will report decrease in R shoulder pain to 1/10 pain or less to ease ability for pt to perform ADLs    Assessment:   Continued with AAROM shoulder flexion exercises and initiated AROM therapeutic exercises.     Plan: continue PT     Charges: Ex 2    Total Timed Treatment: 35 min  Total Treatment Time: 35 min

## 2024-02-13 ENCOUNTER — OFFICE VISIT (OUTPATIENT)
Dept: PHYSICAL THERAPY | Age: 69
End: 2024-02-13
Attending: PHYSICIAN ASSISTANT
Payer: MEDICARE

## 2024-02-13 PROCEDURE — 97110 THERAPEUTIC EXERCISES: CPT

## 2024-02-13 NOTE — PROGRESS NOTES
Diagnosis: Aftercare following surgery (Z48.89)  Status post right rotator cuff repair (Z98.890)          Next MD visit: unknown    Fall Risk: standard         Precautions: surgical protocol  Date of Surgery: 12/14/2023          Medication Changes since last visit?: No    Subjective: Pt reports 2-3/10 right shoulder pain.      Objective:    2/1/2024:  Shoulder PROM (supine):  Flx: R 140 deg  Abd: R 120 deg  ER: R 60 deg     Date: 2/13/2024  Visit #: 9/16 ([employer]) exp. 5/9/2024 Date: 2/8/2024  Visit #: 8/8 ([employer]) exp. 4/11/2024 Date: 2/6/2024  Visit #: 7/8 ([employer]) exp. 4/11/2024   HEP     - updated HEP - see pt instructions section   Therapeutic Exercise   - supine R shoulder PROM into flx/abd/ER x 12 minutes  - supine B shoulder flexion AAROM with wand 2 x 10  - supine B chest press with wand 2 x 10  - supine B shoulder ER AROM 10x   - supine B shoulder flexion AROM 10x  - supine B shoulder flexion AROM 10x  - sidelying R shoulder ER AROM 10x  - sidelying R shoulder flexion to 90 deg 10x  - standing B shoulder extension with wand 10x  - standing R shoulder flexion wall wash 10x  - standing B scap squeezes 10x     - supine R shoulder PROM into flx/abd/ER x 20 minutes  - sidelying R scapular elevation and depression 2 x 10  - sidelying R scapular protraction/retraction  - sidelying R shoulder ER AROM 10x   - supine B shoulder flexion AAROM with wand 1 x 10  - supine B chest press with wand 1 x 10  - supine B shoulder ER AROM 1 x 10  - sidelying R shoulder ER 1 x 10  - seated B towel slides on table 2 x 10  - standing B shoulder flexion wall wash 10x - supine R shoulder PROM into flx/abd/ER x 20 minutes  - sidelying R scapular elevation and depression 2 x 10  - sidelying R scapular protraction/retraction  - supine B shoulder flexion AAROM with wand 1 x 10  - supine B chest press with wand 1 x 10  - seated B towel slides on table 10x   Manual Therapy        Therapeutic Activity         Modalities                  Goals:  1- Pt will be I with maintenance and progression of HEP  2- Pt will demo increase in R shoulder ROM to equal to L ease ability for pt to reach  3- Pt will demo increase in RUE strength to 4+/5 or better to ease ability for pt to lift  4- Pt will report decrease in R shoulder pain to 1/10 pain or less to ease ability for pt to perform ADLs    Assessment:  Continued with transition to AROM therapeutic exercises.     Plan: continue PT     Charges: Ex 3    Total Timed Treatment: 38 min  Total Treatment Time: 38 min

## 2024-02-15 ENCOUNTER — OFFICE VISIT (OUTPATIENT)
Dept: PHYSICAL THERAPY | Age: 69
End: 2024-02-15
Attending: PHYSICIAN ASSISTANT
Payer: MEDICARE

## 2024-02-15 PROCEDURE — 97110 THERAPEUTIC EXERCISES: CPT

## 2024-02-15 NOTE — PROGRESS NOTES
Diagnosis: Aftercare following surgery (Z48.89)  Status post right rotator cuff repair (Z98.890)          Next MD visit: March 4, 2024    Fall Risk: standard         Precautions: surgical protocol  Date of Surgery: 12/14/2023          Medication Changes since last visit?: No    Subjective: Pt reports 2-3/10 right shoulder pain today.      Objective:    2/15/2024:  Shoulder PROM (supine):  Flx: R 150 deg  Abd: 155 deg  ER: R 65 deg     Date: 2/15/2024  Visit #: 10/16 ([employer]) exp. 5/9/2024 Date: 2/13/2024  Visit #: 9/16 ([employer]) exp. 5/9/2024 Date: 2/8/2024  Visit #: 8/8 ([employer]) exp. 4/11/2024   HEP        Therapeutic Exercise   - supine B shoulder flexion AAROM with wand 3 x 10  - supine B shoulder chest press with wand 2 x 10  - supine R shoulder PROM into flx/abd/ER x 12 minutes  - supine B shoulder flexion AROM 2 x 10  - supine B shoulder ER AROM 2 x 10  - sidelying R shoulder ER AROM 10x  - standing B shoulder extension with wand 2 x 10  - standing R shoulder flexion wall wash 10x  - seated OH pulleys into flx x 2 minutes - supine R shoulder PROM into flx/abd/ER x 12 minutes  - supine B shoulder flexion AAROM with wand 2 x 10  - supine B chest press with wand 2 x 10  - supine B shoulder ER AROM 10x   - supine B shoulder flexion AROM 10x  - supine B shoulder flexion AROM 10x  - sidelying R shoulder ER AROM 10x  - sidelying R shoulder flexion to 90 deg 10x  - standing B shoulder extension with wand 10x  - standing R shoulder flexion wall wash 10x  - standing B scap squeezes 10x     - supine R shoulder PROM into flx/abd/ER x 20 minutes  - sidelying R scapular elevation and depression 2 x 10  - sidelying R scapular protraction/retraction  - sidelying R shoulder ER AROM 10x   - supine B shoulder flexion AAROM with wand 1 x 10  - supine B chest press with wand 1 x 10  - supine B shoulder ER AROM 1 x 10  - sidelying R shoulder ER 1 x 10  - seated B towel slides on table 2 x 10  - standing B  shoulder flexion wall wash 10x   Manual Therapy        Therapeutic Activity        Modalities                  Goals:  1- Pt will be I with maintenance and progression of HEP  2- Pt will demo increase in R shoulder ROM to equal to L ease ability for pt to reach  3- Pt will demo increase in RUE strength to 4+/5 or better to ease ability for pt to lift  4- Pt will report decrease in R shoulder pain to 1/10 pain or less to ease ability for pt to perform ADLs    Assessment:  Patient displaying near full shoulder ROM with passive stretching.     Plan: continue PT     Charges: Ex 2    Total Timed Treatment: 35 min  Total Treatment Time: 35 min

## 2024-02-20 ENCOUNTER — OFFICE VISIT (OUTPATIENT)
Dept: PHYSICAL THERAPY | Age: 69
End: 2024-02-20
Attending: PHYSICIAN ASSISTANT
Payer: MEDICARE

## 2024-02-20 PROCEDURE — 97110 THERAPEUTIC EXERCISES: CPT

## 2024-02-20 NOTE — PROGRESS NOTES
Diagnosis: Aftercare following surgery (Z48.89)  Status post right rotator cuff repair (Z98.890)          Next MD visit: March 4, 2024    Fall Risk: standard         Precautions: surgical protocol  Date of Surgery: 12/14/2023          Medication Changes since last visit?: No    Subjective: Pt reports 2-3/10 right shoulder pain today.      Objective:    2/20/2024:  Shoulder PROM (supine):  Flx: R 150 deg  Abd: 155 deg  ER: R 65 deg     Date: 2/20/2024  Visit #: 11/16 ([employer]) exp. 5/9/2024 Date: 2/15/2024  Visit #: 10/16 ([employer]) exp. 5/9/2024 Date: 2/13/2024  Visit #: 9/16 ([employer]) exp. 5/9/2024   HEP   - updated HEP - see pt instructions section; TB provided (yellow, red, green)     Therapeutic Exercise   - supine B shoulder flexion AAROM with wand 3 x 10  - supine B shoulder chest press with wand 3 x 10  - supine B shoulder flexion AROM 2 x 10  - supine B shoulder horizontal abd/add 0# 2 x 10   - supine B shoulder ER AROM 2 x 10  - sidelying R shoulder ER AROM 2 x 10  - sidelying R shoulder abduction 2 x 10  - prone R shoulder extension 2 x 10  - prone R rows 10x  - standing B shoulder extension with wand 2 x 10  - standing R shoulder flexion wall wash 2 x 10  - standing B high scap rows with GSC 10x   - standing B shoulder extension with GSC 10x  - standing R shoulder ER/IR with YTB 10x ea  - standing R shoulder AROM IR/HBB 10x     - supine B shoulder flexion AAROM with wand 3 x 10  - supine B shoulder chest press with wand 2 x 10  - supine R shoulder PROM into flx/abd/ER x 12 minutes  - supine B shoulder flexion AROM 2 x 10  - supine B shoulder ER AROM 2 x 10  - sidelying R shoulder ER AROM 10x  - standing B shoulder extension with wand 2 x 10  - standing R shoulder flexion wall wash 10x  - seated OH pulleys into flx x 2 minutes - supine R shoulder PROM into flx/abd/ER x 12 minutes  - supine B shoulder flexion AAROM with wand 2 x 10  - supine B chest press with wand 2 x 10  - supine B  shoulder ER AROM 10x   - supine B shoulder flexion AROM 10x  - supine B shoulder flexion AROM 10x  - sidelying R shoulder ER AROM 10x  - sidelying R shoulder flexion to 90 deg 10x  - standing B shoulder extension with wand 10x  - standing R shoulder flexion wall wash 10x  - standing B scap squeezes 10x       Manual Therapy        Therapeutic Activity        Modalities                  Goals:  1- Pt will be I with maintenance and progression of HEP   2- Pt will demo increase in R shoulder ROM to equal to L ease ability for pt to reach  3- Pt will demo increase in RUE strength to 4+/5 or better to ease ability for pt to lift  4- Pt will report decrease in R shoulder pain to 1/10 pain or less to ease ability for pt to perform ADLs    Assessment: Continued with active ROM therapeutic exercises and initiated light strengthening interventions per surgical protocol.      Plan: continue PT     Charges: Ex 3    Total Timed Treatment: 45 min  Total Treatment Time: 45 min

## 2024-02-22 ENCOUNTER — OFFICE VISIT (OUTPATIENT)
Dept: PHYSICAL THERAPY | Age: 69
End: 2024-02-22
Attending: PHYSICIAN ASSISTANT
Payer: MEDICARE

## 2024-02-22 PROCEDURE — 97110 THERAPEUTIC EXERCISES: CPT | Performed by: PHYSICAL THERAPIST

## 2024-02-22 PROCEDURE — 97112 NEUROMUSCULAR REEDUCATION: CPT | Performed by: PHYSICAL THERAPIST

## 2024-02-22 NOTE — PROGRESS NOTES
Diagnosis: Aftercare following surgery (Z48.89)  Status post right rotator cuff repair (Z98.890)          Next MD visit: March 4, 2024    Fall Risk: standard         Precautions: surgical protocol  Date of Surgery: 12/14/2023          Medication Changes since last visit?: No    Subjective: Pt reports 2/10 right shoulder pain today.      Objective:  Noted with loss of trunk and scapular bracing with (R) shoulder rhythmic stabilization with minimal perturbations; able to demonstrate improvement with max cueing and repetition. Decreased trunk and scapulothoracic mobility noted with diagonal movements and complex UE movement patterns.    2/22/2024:  Shoulder AROM (supine):  Flx: R 155 deg  scaption: 132 deg (limited by pain)     Date: 2/22/2024  Visit #: 12/16 ([employer]) exp. 5/9/2024 Date: 2/20/2024  Visit #: 11/16 ([employer]) exp. 5/9/2024 Date: 2/15/2024  Visit #: 10/16 ([employer]) exp. 5/9/2024 Date: 2/13/2024  Visit #: 9/16 ([employer]) exp. 5/9/2024   HEP    - updated HEP - see pt instructions section; TB provided (yellow, red, green)     Therapeutic Exercise   45 mins  - (B) shoulder wand into flexion, chest press and ER (R) 3x10 3a  - (B) horizontal abd/add with wand 3x10  - open book SL 3x10 ea  - prone (R) shoulder flexion/ext 3x10  - prone LT 3x10  - prone MT/rhomboid 3x10 ea  - lat pull downs green cord 3x10  - low rows with ER green cord 3x10  - scarecrows yellow cord 3x10  - 1lb ball behind the back pass x 15 ea CW/CCW - supine B shoulder flexion AAROM with wand 3 x 10  - supine B shoulder chest press with wand 3 x 10  - supine B shoulder flexion AROM 2 x 10  - supine B shoulder horizontal abd/add 0# 2 x 10   - supine B shoulder ER AROM 2 x 10  - sidelying R shoulder ER AROM 2 x 10  - sidelying R shoulder abduction 2 x 10  - prone R shoulder extension 2 x 10  - prone R rows 10x  - standing B shoulder extension with wand 2 x 10  - standing R shoulder flexion wall wash 2 x 10  -  standing B high scap rows with GSC 10x   - standing B shoulder extension with GSC 10x  - standing R shoulder ER/IR with YTB 10x ea  - standing R shoulder AROM IR/HBB 10x     - supine B shoulder flexion AAROM with wand 3 x 10  - supine B shoulder chest press with wand 2 x 10  - supine R shoulder PROM into flx/abd/ER x 12 minutes  - supine B shoulder flexion AROM 2 x 10  - supine B shoulder ER AROM 2 x 10  - sidelying R shoulder ER AROM 10x  - standing B shoulder extension with wand 2 x 10  - standing R shoulder flexion wall wash 10x  - seated OH pulleys into flx x 2 minutes - supine R shoulder PROM into flx/abd/ER x 12 minutes  - supine B shoulder flexion AAROM with wand 2 x 10  - supine B chest press with wand 2 x 10  - supine B shoulder ER AROM 10x   - supine B shoulder flexion AROM 10x  - supine B shoulder flexion AROM 10x  - sidelying R shoulder ER AROM 10x  - sidelying R shoulder flexion to 90 deg 10x  - standing B shoulder extension with wand 10x  - standing R shoulder flexion wall wash 10x  - standing B scap squeezes 10x       Neuromuscular re-education 8 mins  - supine (R) static holds 2 x 1 min flexion to 90 and 120  - supine (R) shoulder rhythmic stabs 90 and 120 with min perturbations 2 x 1 min  - (R) shoulder D1/D2 3x10 ea      Manual Therapy         Therapeutic Activity         Modalities                    Goals:  1- Pt will be I with maintenance and progression of HEP   2- Pt will demo increase in R shoulder ROM to equal to L ease ability for pt to reach  3- Pt will demo increase in RUE strength to 4+/5 or better to ease ability for pt to lift  4- Pt will report decrease in R shoulder pain to 1/10 pain or less to ease ability for pt to perform ADLs    Assessment: Decreased thoracic spine, scapulohumeral mobility promoting decreased GH mobility and impairing functional movement patterns in (R)UE    Plan: Consider addressing thoracic spine and scapulohumeral mobility along with GH mobility with manual  interventions if active exercises inadequate in restoring scapulohumeral mechanics.    Charges: Ex 3; Neuromuscular re-education.    Total Timed Treatment: 53 min  Total Treatment Time: 53 min

## 2024-03-04 ENCOUNTER — OFFICE VISIT (OUTPATIENT)
Dept: ORTHOPEDICS CLINIC | Facility: CLINIC | Age: 69
End: 2024-03-04
Payer: MEDICARE

## 2024-03-04 VITALS — HEIGHT: 66 IN | WEIGHT: 202 LBS | BODY MASS INDEX: 32.47 KG/M2

## 2024-03-04 DIAGNOSIS — Z98.890 STATUS POST RIGHT ROTATOR CUFF REPAIR: ICD-10-CM

## 2024-03-04 DIAGNOSIS — Z48.89 AFTERCARE FOLLOWING SURGERY: Primary | ICD-10-CM

## 2024-03-04 PROCEDURE — 99024 POSTOP FOLLOW-UP VISIT: CPT | Performed by: ORTHOPAEDIC SURGERY

## 2024-03-04 PROCEDURE — 3008F BODY MASS INDEX DOCD: CPT | Performed by: ORTHOPAEDIC SURGERY

## 2024-03-04 PROCEDURE — 1160F RVW MEDS BY RX/DR IN RCRD: CPT | Performed by: ORTHOPAEDIC SURGERY

## 2024-03-04 PROCEDURE — 1159F MED LIST DOCD IN RCRD: CPT | Performed by: ORTHOPAEDIC SURGERY

## 2024-03-04 PROCEDURE — 1125F AMNT PAIN NOTED PAIN PRSNT: CPT | Performed by: ORTHOPAEDIC SURGERY

## 2024-03-04 NOTE — PROGRESS NOTES
[facility] Post-op Progress Note      Date of Surgery:  12/19/23    History:  The patient is a 68 year old male who presents with his wife for postop follow-up after undergoing arthroscopy with rotator cuff repair.  The patient is tolerating physical therapy well with only mild soreness, improving function and motion as well as strength.  He inquires as to whether he needs to continue with physical therapy which comes at a cost.    Physical Exam:  On examination the patient has a benign appearing post-operative right shoulder.  There is improving and near normal active range of motion with no signs of significant contractures.  Strength is also returning to near normal on resisted abduction and external rotation.  Distal exam remains intact.    Assessment:  Status post right shoulder arthroscopy with rotator cuff repair, decompression, distal claviculectomy and debridement    Plan:   The patient is recovering well status post arthroscopy.  We recommend continuing physical therapy with gradual transition to home program.  Unfortunately there is an associated co-pay cost and they wonder if transition to home program would be reasonable.  I feel that this is acceptable given that he has near normal motion and should only be working on light periscapular and rotator cuff strengthening at this point.  I did caution the patient that he is only 2-1/2 months status post repair and it may be 6 to 12 months before full strength, complete pain relief and endurance returns.  At the importance of avoiding activities that put him at risk for reinjury or retear were reemphasized.  All questions were answered and he verbalized understanding and appreciation.  Follow-up with me if he has any new symptoms or concerns.  He may transition to home program at this time.    Lila Elaine MD, FAAOS  Sports Medicine/Knee and Shoulder  [facility]  Phone [phone]  Fax [phone]      This document  was partially prepared using Dragon Medical voice recognition software.  Although every attempt is made to correct errors during dictation, discrepancies may still exist.

## 2024-05-14 ENCOUNTER — HOSPITAL ENCOUNTER (OUTPATIENT)
Dept: GENERAL RADIOLOGY | Facility: HOSPITAL | Age: 69
Discharge: HOME | End: 2024-05-14
Attending: INTERNAL MEDICINE

## 2024-05-14 ENCOUNTER — OFFICE VISIT (OUTPATIENT)
Dept: INTERNAL MEDICINE CLINIC | Facility: CLINIC | Age: 69
End: 2024-05-14

## 2024-05-14 ENCOUNTER — LAB ENCOUNTER (OUTPATIENT)
Dept: LAB | Facility: HOSPITAL | Age: 69
End: 2024-05-14
Attending: INTERNAL MEDICINE

## 2024-05-14 VITALS
TEMPERATURE: 97 F | HEART RATE: 75 BPM | HEIGHT: 66 IN | SYSTOLIC BLOOD PRESSURE: 110 MMHG | DIASTOLIC BLOOD PRESSURE: 70 MMHG | WEIGHT: 203.19 LBS | BODY MASS INDEX: 32.66 KG/M2 | OXYGEN SATURATION: 99 %

## 2024-05-14 DIAGNOSIS — R31.29 MICROSCOPIC HEMATURIA: Primary | ICD-10-CM

## 2024-05-14 DIAGNOSIS — K21.9 GASTROESOPHAGEAL REFLUX DISEASE, UNSPECIFIED WHETHER ESOPHAGITIS PRESENT: Primary | ICD-10-CM

## 2024-05-14 DIAGNOSIS — R31.29 MICROSCOPIC HEMATURIA: ICD-10-CM

## 2024-05-14 DIAGNOSIS — M54.12 CERVICAL RADICULOPATHY: ICD-10-CM

## 2024-05-14 DIAGNOSIS — E11.9 TYPE 2 DIABETES MELLITUS WITHOUT COMPLICATION, WITHOUT LONG-TERM CURRENT USE OF INSULIN (HCC): ICD-10-CM

## 2024-05-14 DIAGNOSIS — Z13.89 NEPHROPATHY SCREEN: ICD-10-CM

## 2024-05-14 DIAGNOSIS — K21.9 GASTROESOPHAGEAL REFLUX DISEASE, UNSPECIFIED WHETHER ESOPHAGITIS PRESENT: ICD-10-CM

## 2024-05-14 LAB
ALBUMIN SERPL-MCNC: 4.5 G/DL (ref 3.2–4.8)
ALBUMIN/GLOB SERPL: 1.7 {RATIO} (ref 1–2)
ALP LIVER SERPL-CCNC: 65 U/L
ALT SERPL-CCNC: 14 U/L
ANION GAP SERPL CALC-SCNC: 9 MMOL/L (ref 0–18)
AST SERPL-CCNC: 17 U/L (ref ?–34)
BASOPHILS # BLD AUTO: 0.03 X10(3) UL (ref 0–0.2)
BASOPHILS NFR BLD AUTO: 0.5 %
BILIRUB SERPL-MCNC: 0.4 MG/DL (ref 0.2–1.1)
BILIRUB UR QL: NEGATIVE
BUN BLD-MCNC: 13 MG/DL (ref 9–23)
BUN/CREAT SERPL: 15.5 (ref 10–20)
CALCIUM BLD-MCNC: 9.8 MG/DL (ref 8.7–10.4)
CHLORIDE SERPL-SCNC: 105 MMOL/L (ref 98–112)
CLARITY UR: CLEAR
CO2 SERPL-SCNC: 25 MMOL/L (ref 21–32)
CREAT BLD-MCNC: 0.84 MG/DL
CREAT UR-SCNC: 58 MG/DL
DEPRECATED RDW RBC AUTO: 39.7 FL (ref 35.1–46.3)
EGFRCR SERPLBLD CKD-EPI 2021: 95 ML/MIN/1.73M2 (ref 60–?)
EOSINOPHIL # BLD AUTO: 0.1 X10(3) UL (ref 0–0.7)
EOSINOPHIL NFR BLD AUTO: 1.7 %
ERYTHROCYTE [DISTWIDTH] IN BLOOD BY AUTOMATED COUNT: 12.5 % (ref 11–15)
EST. AVERAGE GLUCOSE BLD GHB EST-MCNC: 171 MG/DL (ref 68–126)
FASTING STATUS PATIENT QL REPORTED: YES
GLOBULIN PLAS-MCNC: 2.7 G/DL (ref 2–3.5)
GLUCOSE BLD-MCNC: 108 MG/DL (ref 70–99)
GLUCOSE UR-MCNC: NORMAL MG/DL
HBA1C MFR BLD: 7.6 % (ref ?–5.7)
HCT VFR BLD AUTO: 39.6 %
HGB BLD-MCNC: 14.1 G/DL
HGB UR QL STRIP.AUTO: NEGATIVE
IMM GRANULOCYTES # BLD AUTO: 0.03 X10(3) UL (ref 0–1)
IMM GRANULOCYTES NFR BLD: 0.5 %
KETONES UR-MCNC: NEGATIVE MG/DL
LEUKOCYTE ESTERASE UR QL STRIP.AUTO: NEGATIVE
LYMPHOCYTES # BLD AUTO: 2.17 X10(3) UL (ref 1–4)
LYMPHOCYTES NFR BLD AUTO: 35.8 %
MCH RBC QN AUTO: 30.9 PG (ref 26–34)
MCHC RBC AUTO-ENTMCNC: 35.6 G/DL (ref 31–37)
MCV RBC AUTO: 86.8 FL
MICROALBUMIN UR-MCNC: <0.3 MG/DL
MONOCYTES # BLD AUTO: 0.51 X10(3) UL (ref 0.1–1)
MONOCYTES NFR BLD AUTO: 8.4 %
NEUTROPHILS # BLD AUTO: 3.22 X10 (3) UL (ref 1.5–7.7)
NEUTROPHILS # BLD AUTO: 3.22 X10(3) UL (ref 1.5–7.7)
NEUTROPHILS NFR BLD AUTO: 53.1 %
NITRITE UR QL STRIP.AUTO: NEGATIVE
OSMOLALITY SERPL CALC.SUM OF ELEC: 289 MOSM/KG (ref 275–295)
PH UR: 5.5 [PH] (ref 5–8)
PLATELET # BLD AUTO: 203 10(3)UL (ref 150–450)
POTASSIUM SERPL-SCNC: 3.9 MMOL/L (ref 3.5–5.1)
PROT SERPL-MCNC: 7.2 G/DL (ref 5.7–8.2)
PROT UR-MCNC: NEGATIVE MG/DL
RBC # BLD AUTO: 4.56 X10(6)UL
SODIUM SERPL-SCNC: 139 MMOL/L (ref 136–145)
SP GR UR STRIP: 1.01 (ref 1–1.03)
UROBILINOGEN UR STRIP-ACNC: NORMAL
WBC # BLD AUTO: 6.1 X10(3) UL (ref 4–11)

## 2024-05-14 PROCEDURE — 3008F BODY MASS INDEX DOCD: CPT | Performed by: INTERNAL MEDICINE

## 2024-05-14 PROCEDURE — 85025 COMPLETE CBC W/AUTO DIFF WBC: CPT | Performed by: INTERNAL MEDICINE

## 2024-05-14 PROCEDURE — 72052 X-RAY EXAM NECK SPINE 6/>VWS: CPT | Performed by: INTERNAL MEDICINE

## 2024-05-14 PROCEDURE — 82570 ASSAY OF URINE CREATININE: CPT | Performed by: INTERNAL MEDICINE

## 2024-05-14 PROCEDURE — 3078F DIAST BP <80 MM HG: CPT | Performed by: INTERNAL MEDICINE

## 2024-05-14 PROCEDURE — 83036 HEMOGLOBIN GLYCOSYLATED A1C: CPT | Performed by: INTERNAL MEDICINE

## 2024-05-14 PROCEDURE — 1159F MED LIST DOCD IN RCRD: CPT | Performed by: INTERNAL MEDICINE

## 2024-05-14 PROCEDURE — 82043 UR ALBUMIN QUANTITATIVE: CPT | Performed by: INTERNAL MEDICINE

## 2024-05-14 PROCEDURE — 3061F NEG MICROALBUMINURIA REV: CPT | Performed by: INTERNAL MEDICINE

## 2024-05-14 PROCEDURE — 80053 COMPREHEN METABOLIC PANEL: CPT | Performed by: INTERNAL MEDICINE

## 2024-05-14 PROCEDURE — 1160F RVW MEDS BY RX/DR IN RCRD: CPT | Performed by: INTERNAL MEDICINE

## 2024-05-14 PROCEDURE — 36415 COLL VENOUS BLD VENIPUNCTURE: CPT | Performed by: INTERNAL MEDICINE

## 2024-05-14 PROCEDURE — 81001 URINALYSIS AUTO W/SCOPE: CPT

## 2024-05-14 PROCEDURE — 83013 H PYLORI (C-13) BREATH: CPT

## 2024-05-14 PROCEDURE — 3051F HG A1C>EQUAL 7.0%<8.0%: CPT | Performed by: INTERNAL MEDICINE

## 2024-05-14 PROCEDURE — 3074F SYST BP LT 130 MM HG: CPT | Performed by: INTERNAL MEDICINE

## 2024-05-14 PROCEDURE — 99214 OFFICE O/P EST MOD 30 MIN: CPT | Performed by: INTERNAL MEDICINE

## 2024-05-14 PROCEDURE — 81015 MICROSCOPIC EXAM OF URINE: CPT

## 2024-05-14 RX ORDER — SILDENAFIL 100 MG/1
100 TABLET, FILM COATED ORAL DAILY PRN
COMMUNITY
Start: 2024-04-29

## 2024-05-15 ENCOUNTER — PATIENT OUTREACH (OUTPATIENT)
Dept: CASE MANAGEMENT | Age: 69
End: 2024-05-15

## 2024-05-15 NOTE — PROGRESS NOTES
Patient identified with a potential need for Chronic Care Management services.  Called patient to introduce self and availability of Chronic Care Management services.  Patient informed about the following service elements:  Health information sharing - complying with all laws related to privacy and security of their health information  Patient/Insurance Cost sharing - includes deductible and any ongoing copays and/or coinsurance  Only one provider can bill for this service monthly  Patient right to discontinue services at any time for any reason effective last day of current month  Patient verbally accepts to participate in Chronic Care Management services and any associated charges.

## 2024-05-16 LAB — H PYLORI BREATH TEST: NEGATIVE

## 2024-05-17 ENCOUNTER — TELEPHONE (OUTPATIENT)
Dept: INTERNAL MEDICINE CLINIC | Facility: CLINIC | Age: 69
End: 2024-05-17

## 2024-05-17 ENCOUNTER — PATIENT OUTREACH (OUTPATIENT)
Dept: CASE MANAGEMENT | Age: 69
End: 2024-05-17

## 2024-05-17 DIAGNOSIS — I10 ESSENTIAL HYPERTENSION: Primary | ICD-10-CM

## 2024-05-17 DIAGNOSIS — E78.2 MIXED HYPERLIPIDEMIA: ICD-10-CM

## 2024-05-17 DIAGNOSIS — E11.9 TYPE 2 DIABETES MELLITUS WITHOUT COMPLICATION, WITHOUT LONG-TERM CURRENT USE OF INSULIN (HCC): Primary | ICD-10-CM

## 2024-05-17 DIAGNOSIS — E11.65 TYPE 2 DIABETES MELLITUS WITH HYPERGLYCEMIA, WITHOUT LONG-TERM CURRENT USE OF INSULIN (HCC): ICD-10-CM

## 2024-05-17 DIAGNOSIS — Z86.008 HISTORY OF CARCINOMA IN SITU OF BLADDER: ICD-10-CM

## 2024-05-17 NOTE — PROGRESS NOTES
Patient contacted regarding results.  A1C slightly increased.  Dietary recommendations provided.      H pylori negative.      Rest of labs with no abnormality.      Cervical neck xray with no significant degenerative joint disease.  He reports pain has improved significantly.     Dr. Patricia

## 2024-05-17 NOTE — TELEPHONE ENCOUNTER
Patient was contacted for [facility] welcome assessment and was wondering about his test results (labs and xray done 05/14/2024)    Patient is also due for eye exam, referral pended for PCP to sign.

## 2024-05-17 NOTE — PROGRESS NOTES
Spoke to Bhargav at length about CCM, current care plan and performed CCM call with Bhargav reviewed meds and compliance.     Interventions for following categories based on CCM initial call.      I want to know a little about you.  What do you do in your spare time? Patient has a park across the street from his house and like to go for daily walks, he likes to go to [employer] a lot  Are you retired or what do you do for a living? Patient is retired    Social support:  Do you live with someone? Patient lives with his wife  Do you have someone you can turn to when you are very stressed and or need help? yes  Who? His family but mostly his daughter and wife  Do you have someone you check in with or talk to on a regular basis? yes  Are you responsible for anyone's care? no    Do you feel you take time for yourself too? Yes all the time   Do you have any pets at home? No    Let's talk about stress.  How much stress do you feel you have in your life? minimal  What stress's you out? His cancer history   How do you manage this stress? By seeing the doctors to stay on top of tests he may need to get done and getting support form [employer] groups.    Nutrition: Let's talk about eating habits  Any special diet you are put on?  diabetic  Do you have any barriers to keeping with this diet? no  Do you eat three small meals a day? Mostly 2 with a snack at night  Do you eat a variety of fruits and veggies? Not really  How do you stay hydrated? Drinking water mostly when he goes out for walks  Daily Menu? Wife cooks a balance meal daily, twice  year receives diabetic meals from [employer].     Exercise   Do you exercise? What do you do? Exercise daily by walking more than 20 mins daily  How often? daily  How long? For 20 mins      Medication review:  Update medications with meds from other providers as well, patient wanted me to discontinue pain medications, norco and tramadaol was removed from his meds list    Do you take them every day on time?  Yes as prescribed    Do you have any questions about your medications? Side effects? Etc.? Not a the moment    Meds: Detailed medication review with Bhargav. Discussed with Bhargav if any potential barriers are present that could prevent compliance with medication regimen. At this time, no barriers reported. Bhargav reports is stable on all medications and denies experiencing any side effects.    Care Team:  Review specialists and add to care team.    Disease specific:   Do you feel you have a good understanding of your Hypertension and Diabetes? yes  Do you have chronic pain: no, was nimco to improve it after shoulder surgery and Physical Therapy.    Provider Goals:  What would you say is your biggest concerns about your health? Stay Cancer free  Have any of your providers set forth a goal they wanted you to achieve? yes   What steps have you taken? Get outpatient labs done   Any barriers? no   Example: eating breakfast daily, walk around the block once a week, cut down on portions for one meal a day.  Etc.    Follow up:  You can call me anytime you have a question or need help with achieving your goals.  I will follow up with you in a few weeks to see how you are doing and if we need to change anything to help you meet your needs.  Remember: what works for one person may not always work for another. We will continue to work on what will help you meet your needs.    Care Plan: Reviewed and updated where needed.  Care manager f/up: in a month  Time Spent This Encounter Total: 22 min medical record review, telephone communication, care plan updates where needed, and education. Provided acknowledgment and validation to patient's concerns.   Monthly Minute Total including today: 22    Physical assessment, complete health history, and need for CCM established by Wali Patricia MD.

## 2024-05-20 NOTE — TELEPHONE ENCOUNTER
Wali Patricia MD  5/17/2024  2:04 PM CDT Back to Top      Patient contacted regarding results.  A1C slightly increased.  Dietary recommendations provided.       H pylori negative.       Rest of labs with no abnormality.       Cervical neck xray with no significant degenerative joint disease.  He reports pain has improved significantly.     Dr. Patricia

## 2024-05-21 NOTE — PROGRESS NOTES
[facility]  Return Patient      HPI:     Chief Complaint   Patient presents with    Abdominal Pain     Loss of appetite, nausea.        Bhargav Love is a 68 year old male presenting for:  Follow up.  Has  has a past medical history of Essential hypertension, History of carcinoma in situ of bladder, Hyperlipidemia, Type 2 diabetes mellitus without complication, without long-term current use of insulin (HCC), and Visual impairment.     Abdominal pain reported.  GERD.      Cervical neck pain reported.  No injury.       Labs:   Complete Metabolic Panel:  Lab Results   Component Value Date/Time     05/14/2024 03:59 PM    K 3.9 05/14/2024 03:59 PM     05/14/2024 03:59 PM    CO2 25.0 05/14/2024 03:59 PM    CREATSERUM 0.84 05/14/2024 03:59 PM    CA 9.8 05/14/2024 03:59 PM     (H) 05/14/2024 03:59 PM    TP 7.2 05/14/2024 03:59 PM    ALB 4.5 05/14/2024 03:59 PM    ALKPHO 65 05/14/2024 03:59 PM    AST 17 05/14/2024 03:59 PM    ALT 14 05/14/2024 03:59 PM    BILT 0.4 05/14/2024 03:59 PM        Hemoglobin A1C, Microalbumin  Lab Results   Component Value Date/Time    A1C 7.6 (H) 05/14/2024 03:59 PM        Lipid panel  Lab Results   Component Value Date/Time    CHOLEST 134 01/05/2023 04:03 PM    HDL 66 (H) 01/05/2023 04:03 PM    TRIG 112 01/05/2023 04:03 PM    LDL 48 01/05/2023 04:03 PM    NONHDLC 68 01/05/2023 04:03 PM     The 10-year ASCVD risk score (Karthik YANG, et al., 2019) is: 18.2%    Values used to calculate the score:      Age: 68 years      Sex: Male      Is Non- : No      Diabetic: Yes      Tobacco smoker: No      Systolic Blood Pressure: 110 mmHg      Is BP treated: Yes      HDL Cholesterol: 66 mg/dL      Total Cholesterol: 134 mg/dL       Medications:  Current Outpatient Medications   Medication Sig Dispense Refill    Sildenafil Citrate 100 MG Oral Tab Take 1 tablet (100 mg total) by mouth daily as needed for Erectile Dysfunction.      metFORMIN HCl  1000 MG Oral Tab TOME 1 TABLETA DOS VECES AL MICHAEL CON LAS COMIDAS 180 tablet 1    losartan 25 MG Oral Tab Take 1 tablet (25 mg total) by mouth daily. 90 tablet 1    simvastatin 20 MG Oral Tab Take 1 tablet (20 mg total) by mouth nightly. 90 tablet 0    acetaminophen 500 MG Oral Tab Take 2 tablets (1,000 mg total) by mouth every 6 (six) hours as needed for Pain. (Patient not taking: Reported on 3/4/2024)      HYDROcodone-acetaminophen (NORCO) 5-325 MG Oral Tab Take 1 tablet by mouth every 4 (four) hours as needed for Pain. (Patient not taking: Reported on 1/22/2024) 30 tablet 0    traMADol 50 MG Oral Tab Take 1 tablet (50 mg total) by mouth daily as needed for Pain. (Patient not taking: Reported on 1/17/2024) 30 tablet 0      PMH:  Past Medical History:    Essential hypertension    History of carcinoma in situ of bladder    20+ years ago    Hyperlipidemia    Type 2 diabetes mellitus without complication, without long-term current use of insulin (HCC)    Visual impairment    glasses         PSH:  Past Surgical History:   Procedure Laterality Date    Colonoscopy N/A 4/19/2018    Procedure: COLONOSCOPY;  Surgeon: Todd Yarbrough MD;  Location: [facility] ENDOSCOPY    Other      left arm fracture repair       Allergies:  No Known Allergies   Social History:  Social History     Socioeconomic History    Marital status:    Tobacco Use    Smoking status: Never    Smokeless tobacco: Never   Vaping Use    Vaping status: Never Used   Substance and Sexual Activity    Alcohol use: Yes     Comment: occasional    Drug use: Never          Family History:  Family History   Problem Relation Age of Onset    Cancer Father     Diabetes Mother     Cataracts Sister     Glaucoma Neg     Macular degeneration Neg           REVIEW OF SYSTEMS:   Review of Systems   Constitutional:  Negative for chills, fatigue, fever and unexpected weight change.   HENT:  Negative for congestion, ear pain, hearing loss, rhinorrhea, sinus pain and sore throat.     Eyes:  Negative for pain, redness and visual disturbance.   Respiratory:  Negative for apnea, cough, chest tightness, shortness of breath and wheezing.    Cardiovascular:  Negative for chest pain, palpitations and leg swelling.   Gastrointestinal:  Positive for abdominal pain. Negative for abdominal distention, blood in stool, constipation and nausea.   Endocrine: Negative for cold intolerance, heat intolerance and polyuria.   Genitourinary:  Negative for dysuria, hematuria and urgency.   Musculoskeletal:  Positive for joint swelling. Negative for arthralgias, back pain, gait problem, myalgias and neck pain.   Skin:  Negative for rash and wound.   Allergic/Immunologic: Negative for food allergies and immunocompromised state.   Neurological:  Negative for dizziness, seizures, facial asymmetry, speech difficulty, weakness, light-headedness, numbness and headaches.   Hematological:  Negative for adenopathy. Does not bruise/bleed easily.   Psychiatric/Behavioral:  Negative for behavioral problems, sleep disturbance and suicidal ideas. The patient is not nervous/anxious.             PHYSICAL EXAM:   /70   Pulse 75   Temp 97.2 °F (36.2 °C)   Ht 5' 6\" (1.676 m)   Wt 203 lb 3.2 oz (92.2 kg)   SpO2 99%   BMI 32.80 kg/m²  Estimated body mass index is 32.8 kg/m² as calculated from the following:    Height as of this encounter: 5' 6\" (1.676 m).    Weight as of this encounter: 203 lb 3.2 oz (92.2 kg).     Wt Readings from Last 3 Encounters:   05/14/24 203 lb 3.2 oz (92.2 kg)   03/04/24 202 lb (91.6 kg)   01/22/24 202 lb (91.6 kg)       Physical Exam  Vitals reviewed.   Constitutional:       General: He is not in acute distress.     Appearance: He is well-developed.   HENT:      Head: Normocephalic and atraumatic.   Eyes:      Conjunctiva/sclera: Conjunctivae normal.      Pupils: Pupils are equal, round, and reactive to light.   Neck:      Thyroid: No thyromegaly.   Cardiovascular:      Rate and Rhythm: Normal rate  and regular rhythm.      Heart sounds: Normal heart sounds, S1 normal and S2 normal. No murmur heard.     No friction rub. No gallop.   Pulmonary:      Effort: Pulmonary effort is normal. No respiratory distress.      Breath sounds: Normal breath sounds. No wheezing or rales.   Chest:      Chest wall: No tenderness.   Abdominal:      General: Bowel sounds are normal. There is no distension.      Palpations: Abdomen is soft. There is no mass.      Tenderness: There is no abdominal tenderness. There is no guarding or rebound.   Musculoskeletal:         General: No tenderness. Normal range of motion.      Cervical back: Normal range of motion.   Lymphadenopathy:      Cervical: No cervical adenopathy.   Skin:     General: Skin is warm.      Findings: No erythema or rash.   Neurological:      Mental Status: He is alert and oriented to person, place, and time.      Cranial Nerves: No cranial nerve deficit.      Deep Tendon Reflexes: Reflexes are normal and symmetric.   Psychiatric:         Behavior: Behavior normal.         Thought Content: Thought content normal.         Judgment: Judgment normal.         Bilateral barefoot skin diabetic exam is normal, visualized feet and the appearance is normal.  Bilateral monofilament/sensation of both feet is normal.  Pulsation pedal pulse exam of both lower legs/feet is normal as well.        ASSESSMENT AND PLAN:   Patient is a 68 year old male who presents primarily presents for:    (R31.29) Microscopic hematuria  (primary encounter diagnosis)  Plan: Urinalysis, Routine, CBC With Differential With        Platelet, Comp Metabolic Panel (14) [E], UA         Microscopic only, urine [E]      (K21.9) Gastroesophageal reflux disease, unspecified whether esophagitis present  Plan:Helicobacter Pylori Breath Test,         Adult            (M54.12) Cervical radiculopathy  Plan: XR CERVICAL SPINE COMPLETE W/FLEX + EXT         (CPT=72052)          (Z13.89) Nephropathy screen  Plan:  Microalb/Creat Ratio, Random Urine            (E11.9) Type 2 diabetes mellitus without complication, without long-term current use of insulin ([facility])  Plan: metFORMIN HCl 1000 MG Oral Tab, Hemoglobin A1C         (Glycohemoglobin) [E]                Wali Patricia MD     Return in about 3 months (around 8/14/2024) for Symptom monitoring.  Important issues to follow up on next visit      Patient indicates understanding of the above recommendations and agrees to the above plan.  Reasurrance and education provided. All questions answered.  Notified to call with any questions, complications, allergies, or worsening or changing symptoms as well as any side effects or complications from the treatments .  Red flags/ ER precautions discussed.    Total time spent was 30 minutes in addition to annual which includes: Preparation to see patient including chart review, reviewing appropriate medical history, counseling and education (diet and exercise), discussing treatment options, ordering appropriate diagnostic tests and documentation.    Wali Patricia MD  Internal Medicine/Primary Care  [facility]

## 2024-05-30 DIAGNOSIS — E11.9 TYPE 2 DIABETES MELLITUS WITHOUT COMPLICATION, WITHOUT LONG-TERM CURRENT USE OF INSULIN (HCC): ICD-10-CM

## 2024-05-31 RX ORDER — LOSARTAN POTASSIUM 25 MG/1
25 TABLET ORAL DAILY
Qty: 90 TABLET | Refills: 1 | Status: SHIPPED | OUTPATIENT
Start: 2024-05-31

## 2024-07-29 DIAGNOSIS — E11.9 TYPE 2 DIABETES MELLITUS WITHOUT COMPLICATION, WITHOUT LONG-TERM CURRENT USE OF INSULIN (HCC): ICD-10-CM

## 2024-07-30 RX ORDER — SIMVASTATIN 20 MG
20 TABLET ORAL NIGHTLY
Qty: 90 TABLET | Refills: 1 | Status: SHIPPED | OUTPATIENT
Start: 2024-07-30

## 2024-09-25 ENCOUNTER — PATIENT OUTREACH (OUTPATIENT)
Dept: CASE MANAGEMENT | Age: 69
End: 2024-09-25

## 2024-09-25 DIAGNOSIS — H52.4 HYPEROPIA WITH ASTIGMATISM AND PRESBYOPIA, BILATERAL: ICD-10-CM

## 2024-09-25 DIAGNOSIS — H52.203 HYPEROPIA WITH ASTIGMATISM AND PRESBYOPIA, BILATERAL: ICD-10-CM

## 2024-09-25 DIAGNOSIS — H52.03 HYPEROPIA WITH ASTIGMATISM AND PRESBYOPIA, BILATERAL: ICD-10-CM

## 2024-09-25 DIAGNOSIS — Z86.008 HISTORY OF CARCINOMA IN SITU OF BLADDER: ICD-10-CM

## 2024-09-25 DIAGNOSIS — I10 ESSENTIAL HYPERTENSION: ICD-10-CM

## 2024-09-25 DIAGNOSIS — H25.13 AGE-RELATED NUCLEAR CATARACT OF BOTH EYES: ICD-10-CM

## 2024-09-25 DIAGNOSIS — E11.65 TYPE 2 DIABETES MELLITUS WITH HYPERGLYCEMIA, WITHOUT LONG-TERM CURRENT USE OF INSULIN (HCC): Primary | ICD-10-CM

## 2024-09-25 DIAGNOSIS — H43.393 FLOATER, VITREOUS, BILATERAL: ICD-10-CM

## 2024-09-25 NOTE — PROGRESS NOTES
9/25/2024  Spoke to Bhargav for CCM.      Updates to patient care team/comments:  UTD/Reviewed with patient  Patient reported changes in medications: None   Med Adherence  Comment: the patient is taking medications as prescribed.     Health Maintenance: UTD/Reviewed with patient  Health Maintenance   Topic Date Due    Pneumococcal Vaccine: 65+ Years (3 of 3 - PPSV23 or PCV20) 07/16/2024 reminded to get from PCP's office    COVID-19 Vaccine (4 - 2023-24 season) 09/01/2024 reminded to get from local pharmacy    Diabetes Care Dilated Eye Exam  12/31/2024 will schedule for December    Influenza Vaccine (1) 10/01/2024    Diabetes Care A1C  11/14/2024    Diabetes Care Foot Exam  01/17/2025    Diabetes Care: GFR  05/14/2025    Diabetes Care: Microalb/Creat Ratio  05/14/2025    PSA  01/17/2026    Colorectal Cancer Screening  04/19/2028    MA Annual Health Assessment  Completed    Annual Depression Screening  Completed    Fall Risk Screening (Annual)  Completed    Zoster Vaccines  Completed       Patient updates/concerns:      The patient had an appointment to be seen by PCP on 09/09 but had to be rescheduled due to provider being out of the office, the patient was rescheduled and will be seen on 09/30/2024.    The patient reported to be doing well and has no concerns.    The patient was offered by CCM to have PCP's office enter lab orders for him to get them done prior to his visit but he prefers to wait until he's seen by PCP.    Goals/Action Plan:    Active goal from previous outreach: continue to control his diabetes to stay healthy    Patient reported progress towards goals: ongoing and working towards goal               - What: diabetes control           - Where/When/How: be more conscious about his meals and the portions, stay active  Patient Reported Barriers and Concerns: None                   - Plan for overcoming barriers: N/A    Care Managers Interventions:      SDOH completed today.    CCM reminded patient to  schedule his diabetic eye exam for the month of December.    [facility] opened referral to get approval from [facility] for patient to see Dr Pierce and referral is now approved and expires 12/25/2024.    CCM encouraged patient to take the time to enjoy the things he likes to do.    CCM motivates patient to stay active and be as productive as possible to stay healthy and prevent further health complications.    Reconciled the patient's chart using care everywhere.    Updated health maintenance: Recommended patient about getting pneumonia vaccine form his pcp's office and covid vaccine from local pharmacy.     Immunization Query completed: No updates available.    Encouraged patient to call as needed.       Future Appointments:   Future Appointments   Date Time Provider Department Center   9/30/2024 12:40 PM Wali Patricia MD [id number]         Next Care Manager Follow Up Date:  in a month.    Reason For Follow Up: review progress and or barriers towards patient's goals.     Time Spent This Encounter Total: 20 min medical record review, telephone communication, care plan updates where needed, education, goals, and action plan recreation/update. Provided acknowledgment and validation to patient's concerns.   Monthly Minute Total including today: 20  Physical assessment, complete health history, and need for CCM established by Wali Patricia MD.

## 2024-09-30 ENCOUNTER — OFFICE VISIT (OUTPATIENT)
Dept: INTERNAL MEDICINE CLINIC | Facility: CLINIC | Age: 69
End: 2024-09-30
Payer: MEDICARE

## 2024-09-30 ENCOUNTER — LAB ENCOUNTER (OUTPATIENT)
Dept: LAB | Facility: REFERENCE LAB | Age: 69
End: 2024-09-30
Attending: INTERNAL MEDICINE
Payer: MEDICARE

## 2024-09-30 VITALS
BODY MASS INDEX: 34.74 KG/M2 | HEIGHT: 66 IN | DIASTOLIC BLOOD PRESSURE: 64 MMHG | OXYGEN SATURATION: 98 % | HEART RATE: 72 BPM | SYSTOLIC BLOOD PRESSURE: 118 MMHG | RESPIRATION RATE: 18 BRPM | WEIGHT: 216.19 LBS

## 2024-09-30 DIAGNOSIS — M54.2 NECK PAIN: Primary | ICD-10-CM

## 2024-09-30 DIAGNOSIS — E11.9 CONTROLLED TYPE 2 DIABETES MELLITUS WITHOUT COMPLICATION, WITHOUT LONG-TERM CURRENT USE OF INSULIN (HCC): ICD-10-CM

## 2024-09-30 DIAGNOSIS — R31.29 MICROSCOPIC HEMATURIA: ICD-10-CM

## 2024-09-30 DIAGNOSIS — E78.5 HYPERLIPIDEMIA, UNSPECIFIED HYPERLIPIDEMIA TYPE: ICD-10-CM

## 2024-09-30 DIAGNOSIS — Z13.89 NEPHROPATHY SCREEN: ICD-10-CM

## 2024-09-30 DIAGNOSIS — M54.12 CERVICAL RADICULOPATHY: ICD-10-CM

## 2024-09-30 LAB
CHOLEST SERPL-MCNC: 132 MG/DL (ref ?–200)
CREAT UR-SCNC: 55.5 MG/DL
EST. AVERAGE GLUCOSE BLD GHB EST-MCNC: 166 MG/DL (ref 68–126)
FASTING PATIENT LIPID ANSWER: YES
HBA1C MFR BLD: 7.4 % (ref ?–5.7)
HDLC SERPL-MCNC: 51 MG/DL (ref 40–59)
LDLC SERPL CALC-MCNC: 63 MG/DL (ref ?–100)
MICROALBUMIN UR-MCNC: <0.3 MG/DL
NONHDLC SERPL-MCNC: 81 MG/DL (ref ?–130)
TRIGL SERPL-MCNC: 95 MG/DL (ref 30–149)
VLDLC SERPL CALC-MCNC: 14 MG/DL (ref 0–30)

## 2024-09-30 PROCEDURE — 36415 COLL VENOUS BLD VENIPUNCTURE: CPT | Performed by: INTERNAL MEDICINE

## 2024-09-30 PROCEDURE — 82043 UR ALBUMIN QUANTITATIVE: CPT | Performed by: INTERNAL MEDICINE

## 2024-09-30 PROCEDURE — 81015 MICROSCOPIC EXAM OF URINE: CPT

## 2024-09-30 PROCEDURE — 82570 ASSAY OF URINE CREATININE: CPT | Performed by: INTERNAL MEDICINE

## 2024-09-30 PROCEDURE — 80061 LIPID PANEL: CPT | Performed by: INTERNAL MEDICINE

## 2024-09-30 PROCEDURE — 83036 HEMOGLOBIN GLYCOSYLATED A1C: CPT | Performed by: INTERNAL MEDICINE

## 2024-09-30 NOTE — PROGRESS NOTES
[facility]  Return Patient Progress Note      HPI:     Chief Complaint   Patient presents with    Follow - Up     Pt states that he is still having cervical neck pain.        Bhargav Love is a 69 year old male presenting for:    Has a significant  has a past medical history of Essential hypertension, History of carcinoma in situ of bladder, Hyperlipidemia, Type 2 diabetes mellitus without complication, without long-term current use of insulin (HCC), and Visual impairment.    Patient continues to report that he is having cervical neck pain.  X-ray was done previously and he tried supportive treatment for over 6 months.  No improvement if anything his symptoms are worsening.      Labs:   CMP:  Lab Results   Component Value Date/Time     05/14/2024 03:59 PM    K 3.9 05/14/2024 03:59 PM     05/14/2024 03:59 PM    CO2 25.0 05/14/2024 03:59 PM    CREATSERUM 0.84 05/14/2024 03:59 PM    CA 9.8 05/14/2024 03:59 PM     (H) 05/14/2024 03:59 PM    TP 7.2 05/14/2024 03:59 PM    ALB 4.5 05/14/2024 03:59 PM    ALKPHO 65 05/14/2024 03:59 PM    AST 17 05/14/2024 03:59 PM    ALT 14 05/14/2024 03:59 PM    BILT 0.4 05/14/2024 03:59 PM        CBC:  Lab Results   Component Value Date    WBC 6.1 05/14/2024    HGB 14.1 05/14/2024    HCT 39.6 05/14/2024    .0 05/14/2024    NEPERCENT 53.1 05/14/2024    LYPERCENT 35.8 05/14/2024    MOPERCENT 8.4 05/14/2024    EOPERCENT 1.7 05/14/2024    BAPERCENT 0.5 05/14/2024    NE 3.22 05/14/2024    LYMABS 2.17 05/14/2024    MOABSO 0.51 05/14/2024    EOABSO 0.10 05/14/2024    BAABSO 0.03 05/14/2024          Hemoglobin A1C, Microalbumin  Lab Results   Component Value Date/Time    A1C 7.6 (H) 05/14/2024 03:59 PM        Lipid panel  Lab Results   Component Value Date/Time    CHOLEST 134 01/05/2023 04:03 PM    HDL 66 (H) 01/05/2023 04:03 PM    TRIG 112 01/05/2023 04:03 PM    LDL 48 01/05/2023 04:03 PM    NONHDLC 68 01/05/2023 04:03 PM         Medications:  Current Outpatient Medications   Medication Sig Dispense Refill    simvastatin 20 MG Oral Tab Take 1 tablet (20 mg total) by mouth nightly. 90 tablet 1    losartan 25 MG Oral Tab Take 1 tablet (25 mg total) by mouth daily. 90 tablet 1    Sildenafil Citrate 100 MG Oral Tab Take 1 tablet (100 mg total) by mouth daily as needed for Erectile Dysfunction.      metFORMIN HCl 1000 MG Oral Tab TOME 1 TABLETA DOS VECES AL MICHAEL CON LAS COMIDAS 180 tablet 1      PMH:  Past Medical History:    Essential hypertension    History of carcinoma in situ of bladder    20+ years ago    Hyperlipidemia    Type 2 diabetes mellitus without complication, without long-term current use of insulin (HCC)    Visual impairment    glasses         PSH:  Past Surgical History:   Procedure Laterality Date    Colonoscopy N/A 4/19/2018    Procedure: COLONOSCOPY;  Surgeon: Todd Yarbrough MD;  Location: [facility] ENDOSCOPY    Other      left arm fracture repair       Allergies:  No Known Allergies   Social History:  Social History     Socioeconomic History    Marital status:    Tobacco Use    Smoking status: Never    Smokeless tobacco: Never   Vaping Use    Vaping status: Never Used   Substance and Sexual Activity    Alcohol use: Yes     Comment: occasional    Drug use: Never     Social Determinants of Health     Financial Resource Strain: Low Risk  (9/25/2024)    Financial Resource Strain     Difficulty of Paying Living Expenses: Not hard at all     Med Affordability: No   Food Insecurity: No Food Insecurity (9/25/2024)    Food Insecurity     Food Insecurity: Never true   Transportation Needs: No Transportation Needs (9/25/2024)    Transportation Needs     Lack of Transportation: No   Physical Activity: Sufficiently Active (9/25/2024)    Exercise Vital Sign     Days of Exercise per Week: 6 days     Minutes of Exercise per Session: 40 min   Stress: No Stress Concern Present (9/25/2024)    Stress     Feeling of Stress : No   Social  Connections: Socially Integrated (9/25/2024)    Social Connections     Frequency of Socialization with Friends and Family: 3   Housing Stability: Low Risk  (9/25/2024)    Housing Stability     Housing Instability: No      Family History:  Family History   Problem Relation Age of Onset    Cancer Father     Diabetes Mother     Cataracts Sister     Glaucoma Neg     Macular degeneration Neg               REVIEW OF SYSTEMS:   Review of Systems   Constitutional:  Negative for chills, fatigue, fever and unexpected weight change.   HENT:  Negative for congestion, ear pain, hearing loss, rhinorrhea, sinus pain and sore throat.    Eyes:  Negative for pain, redness and visual disturbance.   Respiratory:  Negative for apnea, cough, chest tightness, shortness of breath and wheezing.    Cardiovascular:  Negative for chest pain, palpitations and leg swelling.   Gastrointestinal:  Negative for abdominal distention, abdominal pain, blood in stool, constipation and nausea.   Endocrine: Negative for cold intolerance, heat intolerance and polyuria.   Genitourinary:  Negative for dysuria, hematuria and urgency.   Musculoskeletal:  Positive for neck pain. Negative for arthralgias, back pain, gait problem, joint swelling and myalgias.   Skin:  Negative for rash and wound.   Allergic/Immunologic: Negative for food allergies and immunocompromised state.   Neurological:  Negative for dizziness, seizures, facial asymmetry, speech difficulty, weakness, light-headedness, numbness and headaches.   Hematological:  Negative for adenopathy. Does not bruise/bleed easily.   Psychiatric/Behavioral:  Negative for behavioral problems, sleep disturbance and suicidal ideas. The patient is not nervous/anxious.             PHYSICAL EXAM:   /64   Pulse 72   Resp 18   Ht 5' 6\" (1.676 m)   Wt 216 lb 3.2 oz (98.1 kg)   SpO2 98%   BMI 34.90 kg/m²  Estimated body mass index is 34.9 kg/m² as calculated from the following:    Height as of this  encounter: 5' 6\" (1.676 m).    Weight as of this encounter: 216 lb 3.2 oz (98.1 kg).     Wt Readings from Last 3 Encounters:   09/30/24 216 lb 3.2 oz (98.1 kg)   05/14/24 203 lb 3.2 oz (92.2 kg)   03/04/24 202 lb (91.6 kg)       Physical Exam  Vitals reviewed.   Constitutional:       General: He is not in acute distress.     Appearance: He is well-developed.   HENT:      Head: Normocephalic and atraumatic.   Eyes:      Conjunctiva/sclera: Conjunctivae normal.      Pupils: Pupils are equal, round, and reactive to light.   Neck:      Thyroid: No thyromegaly.   Cardiovascular:      Rate and Rhythm: Normal rate and regular rhythm.      Heart sounds: Normal heart sounds, S1 normal and S2 normal. No murmur heard.     No friction rub. No gallop.   Pulmonary:      Effort: Pulmonary effort is normal. No respiratory distress.      Breath sounds: Normal breath sounds. No wheezing or rales.   Chest:      Chest wall: No tenderness.   Abdominal:      General: Bowel sounds are normal. There is no distension.      Palpations: Abdomen is soft. There is no mass.      Tenderness: There is no abdominal tenderness. There is no guarding or rebound.   Musculoskeletal:         General: No tenderness. Normal range of motion.      Cervical back: Normal range of motion.   Lymphadenopathy:      Cervical: No cervical adenopathy.   Skin:     General: Skin is warm.      Findings: No erythema or rash.   Neurological:      Mental Status: He is alert and oriented to person, place, and time.      Cranial Nerves: No cranial nerve deficit.      Deep Tendon Reflexes: Reflexes are normal and symmetric.   Psychiatric:         Behavior: Behavior normal.         Thought Content: Thought content normal.         Judgment: Judgment normal.               ASSESSMENT AND PLAN:   Patient is a 69 year old male who presents primarily presents for:    (M54.2) Neck pain  (primary encounter diagnosis)  Plan: MRI SPINE CERVICAL (CPT=72141)        General Symptoms will  proceed with MRI    (M54.12) Cervical radiculopathy  Plan: MRI SPINE CERVICAL (CPT=72141)            (Z13.89) Nephropathy screen  Plan: Microalb/Creat Ratio, Random Urine            (E78.5) Hyperlipidemia, unspecified hyperlipidemia type  Plan: Lipid Panel            (E11.9) Controlled type 2 diabetes mellitus without complication, without long-term current use of insulin (HCC)  Plan: Hemoglobin A1C (Glycohemoglobin) [E]          He is on metformin.  On ARB (losartan 25 mg daily).        Health Maintenance:    Health Maintenance   Topic Date Due    Pneumococcal Vaccine: 65+ Years (3 of 3 - PPSV23 or PCV20) 07/16/2024    COVID-19 Vaccine (4 - 2023-24 season) 09/01/2024    Diabetes Care Dilated Eye Exam  12/31/2024 (Originally 5/4/2024)    Influenza Vaccine (1) 10/01/2024    Diabetes Care A1C  11/14/2024    Diabetes Care Foot Exam  01/17/2025    Diabetes Care: GFR  05/14/2025    Diabetes Care: Microalb/Creat Ratio  05/14/2025    PSA  01/17/2026    Colorectal Cancer Screening  04/19/2028    MA Annual Health Assessment  Completed    Annual Depression Screening  Completed    Fall Risk Screening (Annual)  Completed    Zoster Vaccines  Completed         Meds & Refills for this Visit:  Requested Prescriptions      No prescriptions requested or ordered in this encounter       No orders of the defined types were placed in this encounter.      Imaging & Consults:  None          No follow-ups on file.  Important follow up notes/labs for next visit      Patient indicates understanding of the above recommendations and agrees to the above plan.  Reasurrance and education provided. All questions answered.    Notified to call with any questions, complications, allergies, or worsening or changing symptoms as well as any side effects or complications from the treatments .  Red flags/ ER precautions discussed.    If diagnostic labs or imaging ordered advised patient to contact my office for results  24-48 hours after completion    This  note was dictated using dragon speech recognition transcription software.  Typographical and transcription errors may be present.  Please call if any questions.             Wali Patricia MD  [facility]

## 2024-10-07 ENCOUNTER — PATIENT OUTREACH (OUTPATIENT)
Dept: CASE MANAGEMENT | Age: 69
End: 2024-10-07

## 2024-10-08 NOTE — PROGRESS NOTES
The patient needed the number to his ophthalmologist, CCM offered to schedule his appointment and he accepted it.  1min with patient    Called made to the office of tolu Benedictt he is now scheduled for 11/11 at 1120 in the [place] office.  3 mins with eye dr office    Called patient to inform of his appointment in the new location in [place] but he prefers [place].  1min     Called made again to the eye center and their first available is not until January, the patient was notified of this and wanted to just keep the scheduled appointment in [place] for November.  3 mins with eye center and 1 with patient.    Total time - 10 min  Total Monthly time- 10 min    Referral was requested on 10/8 to have PCP's office mail to patient along with address to his new location and per PATTY Carlisle it will be mailed today.

## 2024-10-30 ENCOUNTER — TELEPHONE (OUTPATIENT)
Dept: INTERNAL MEDICINE CLINIC | Facility: CLINIC | Age: 69
End: 2024-10-30

## 2024-10-30 DIAGNOSIS — E11.9 TYPE 2 DIABETES MELLITUS WITHOUT COMPLICATION, WITHOUT LONG-TERM CURRENT USE OF INSULIN (HCC): ICD-10-CM

## 2024-10-30 RX ORDER — LOSARTAN POTASSIUM 25 MG/1
25 TABLET ORAL DAILY
Qty: 90 TABLET | Refills: 1 | Status: SHIPPED | OUTPATIENT
Start: 2024-10-30

## 2024-10-31 ENCOUNTER — HOSPITAL ENCOUNTER (OUTPATIENT)
Dept: MRI IMAGING | Facility: HOSPITAL | Age: 69
Discharge: HOME | End: 2024-10-31
Attending: INTERNAL MEDICINE
Payer: MEDICARE

## 2024-10-31 DIAGNOSIS — M54.12 CERVICAL RADICULOPATHY: ICD-10-CM

## 2024-10-31 DIAGNOSIS — M54.2 NECK PAIN: ICD-10-CM

## 2024-10-31 PROCEDURE — 72141 MRI NECK SPINE W/O DYE: CPT | Performed by: INTERNAL MEDICINE

## 2024-11-04 RX ORDER — SILDENAFIL 100 MG/1
100 TABLET, FILM COATED ORAL DAILY PRN
Qty: 20 TABLET | Refills: 0 | Status: SHIPPED | OUTPATIENT
Start: 2024-11-04

## 2024-11-05 ENCOUNTER — TELEPHONE (OUTPATIENT)
Dept: INTERNAL MEDICINE CLINIC | Facility: CLINIC | Age: 69
End: 2024-11-05

## 2024-11-05 ENCOUNTER — SPINE CENTER NAVIGATION (OUTPATIENT)
Age: 69
End: 2024-11-05

## 2024-11-05 DIAGNOSIS — M50.90 CERVICAL NECK PAIN WITH EVIDENCE OF DISC DISEASE: Primary | ICD-10-CM

## 2024-11-05 DIAGNOSIS — M54.2 CERVICAL PAIN (NECK): Primary | ICD-10-CM

## 2024-11-05 NOTE — PROGRESS NOTES
Spine Center Referral Navigation Encounter Note    Referred by: Wali Patricia MD     Imaging: MRI Cervical Spine   If imaging done at an external facility, instructed patient to bring disc of MRI to appointment.     Previously Seen Spine Care Providers: No    Referred to: Angela Talavera DO in Physiatry     Information below is patient reported.      [facility]  Are you currently experiencing any of the following symptoms?      No    Is your condition due to an injury that occurred at work or is your care for this condition being coordinated by Worker's Compensation?      No    Are you looking for a second surgical opinion?      No    Have you had surgery on your spine (neck or back) in the last 12 months?      No    In the last several weeks, have you experienced weakness or balance issues that have caused falls or difficulty lifting your legs or feet (lower body) and/or weakness that has caused you to drop items or caused changes in handwriting (upper body)?      No    In the last 3 months, have you had spine injections AND physical therapy for your back or neck that did not improve your symptoms?      No    : Patient needs to be seen by non-surgical team. Does patient require a new visit or established visit?      New patient visit    Are you closer to [facility] or [facility]?      [facility]

## 2024-11-05 NOTE — TELEPHONE ENCOUNTER
Inform him that C spine shows arthrosis which is causing discomfort.  Referral for spine clinic entered.  They will contact him.     Dr. Patricia

## 2024-11-06 NOTE — TELEPHONE ENCOUNTER
Spoke to  Jam # [id number]  to Bhargav informed of Dr. Patricia's interpretation of MRI and submitted a referral for the spine clinic. Bhargav informed someone from the spine clinic. Patient stated the spine clinic already contacted him and scheduled an appointment.

## 2024-11-13 ENCOUNTER — OFFICE VISIT (OUTPATIENT)
Dept: PHYSICAL MEDICINE AND REHAB | Facility: CLINIC | Age: 69
End: 2024-11-13
Payer: MEDICARE

## 2024-11-13 VITALS — BODY MASS INDEX: 33 KG/M2 | OXYGEN SATURATION: 98 % | HEART RATE: 76 BPM | WEIGHT: 206 LBS

## 2024-11-13 DIAGNOSIS — M47.812 CERVICAL FACET JOINT SYNDROME: Primary | ICD-10-CM

## 2024-11-13 DIAGNOSIS — M79.18 CERVICAL MYOFASCIAL PAIN SYNDROME: ICD-10-CM

## 2024-11-13 PROCEDURE — 1159F MED LIST DOCD IN RCRD: CPT | Performed by: PHYSICAL MEDICINE & REHABILITATION

## 2024-11-13 PROCEDURE — 1160F RVW MEDS BY RX/DR IN RCRD: CPT | Performed by: PHYSICAL MEDICINE & REHABILITATION

## 2024-11-13 PROCEDURE — 1126F AMNT PAIN NOTED NONE PRSNT: CPT | Performed by: PHYSICAL MEDICINE & REHABILITATION

## 2024-11-13 PROCEDURE — 99204 OFFICE O/P NEW MOD 45 MIN: CPT | Performed by: PHYSICAL MEDICINE & REHABILITATION

## 2024-11-13 RX ORDER — MELOXICAM 15 MG/1
15 TABLET ORAL DAILY
Qty: 14 TABLET | Refills: 0 | Status: SHIPPED | OUTPATIENT
Start: 2024-11-13 | End: 2024-11-27

## 2024-11-13 NOTE — PROGRESS NOTES
[facility]  NEW PATIENT EVALUATION    Consultation as a request of Dr. Patricia      HISTORY OF PRESENT ILLNESS:     Chief Complaint   Patient presents with    New Patient     New R handed patient is here for b/l neck pain. Denies injury. He had b/l rotator cuff surgery in the past. Noticed neck pain in 2016. T/n in base of skull. No pain. Pain when rotating head to sides. No pain meds. 10/31/24 MRI. No PT or injections.        The patient is a 69 year old male with significant past medical history of hypertension, hyperlipidemia, type 2 diabetes, rotator cuff tear s/p repair who presents with neck pain.  Patient denies any specific injury or trauma.  He does have a work-related injury where he had a shoulder injury in 2016 he had rotator cuff repair and ever since has had neck pain.  Pain is located lies in the axial cervical spine.  He denies any radiating symptoms in the arms any numbness tingling or weakness.  Pain is more of a aching throbbing sensation.  He rates it to be 10.  He is not take any medication.  He has had MRI imaging of the cervical spine as noted below.  He has not any dedicated PT or any other treatment.  He is not currently working.      PHYSICAL EXAM:   Pulse 76   Wt 206 lb (93.4 kg)   SpO2 98%   BMI 33.25 kg/m²     Gait: Normal    CERVICAL SPINE:  Inspection: no erythema, swelling, or obvious deformity  Palpation: no ttp over spinous process tenderness to palpation of the cervical paraspinals, upper trap and levator scapulae bilaterally with trigger points noted  ROM: intact to all planes of motion of cervical spine including side-bend bilaterally, rotation bilaterally, flexion, and extension   Strength: 5/5 in upper extremities bilaterally  Sensation: Intact to light touch in all dermatomes of the bilateral upper extremities  Reflexes: 2/4 at C5, C6, C7 bilaterally  Spurling Test: negative for radicular symptoms down either extremity  bilaterally  Jeronimo's sign: negative bilaterally      IMAGING:     MRI Cervical spine completed 10/31/2024 was personally reviewed which is notable for small central disc protrusion at C2-3 and facet arthrosis with small central disc protrusion at C3-4 and C4-5 with a disc osteophyte complex and C5-6 similarly and a small central disc protrusion at C6-7 with no significant spinal or foraminal narrowing at any level.    All imaging results were reviewed and discussed with patient.      ASSESSMENT/PLAN:     1. Cervical facet joint syndrome    2. Cervical myofascial pain syndrome        Bhargav Love a 69-year-old male presenting today for evaluation of neck pain with cervical facet joint syndrome and cervical myofascial symptoms.  He does have small disc protrusions at multiple levels without any significant spinal or foraminal narrowing.  I recommended starting a PT program with home exercises.  I recommend ice and heat as tolerated as well as meloxicam 15 mg daily for the next 7 to 10 days.  Recommend follow-up in 4 weeks if pain is no better we will consider cervical facet joint injections under fluoroscopy guidance.      The patient verbalized understanding with the plan and was in agreement. All questions/concerns were addressed and there were no barriers to learning.  Please note Dragon dictation software was used to dictate this note and can result in inadvertent typos.    Angela Talavera D.O. 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+ years ago    Hyperlipidemia    Type 2 diabetes mellitus without complication, without long-term current use of insulin (HCC)    Visual impairment    glasses         PAST SURGICAL HISTORY:     Past Surgical History:   Procedure Laterality Date    Colonoscopy N/A 4/19/2018    Procedure: COLONOSCOPY;  Surgeon: Zenon  Todd GARCIA MD;  [facility] END[facility]COPY    Other      left arm fracture repair         CURRENT MEDICATIONS:     Current Outpatient Medications   Medication Sig Dispense Refill    Meloxicam (MOBIC) 15 MG Oral Tab Take 1 tablet (15 mg total) by mouth daily for 14 days. 14 tablet 0    SILDENAFIL CITRATE 100 MG Oral Tab Take 1 tablet (100 mg total) by mouth daily as needed for Erectile Dysfunction. 20 tablet 0    losartan 25 MG Oral Tab Take 1 tablet (25 mg total) by mouth daily. 90 tablet 1    metFORMIN HCl 1000 MG Oral Tab TOME 1 TABLETA DOS VECES AL MICHAEL CON LAS COMIDAS 180 tablet 1    simvastatin 20 MG Oral Tab Take 1 tablet (20 mg total) by mouth nightly. 90 tablet 1         ALLERGIES:   Allergies[1]      FAMILY HISTORY:     Family History   Problem Relation Age of Onset    Cancer Father     Diabetes Mother     Cataracts Sister     Glaucoma Neg     Macular degeneration Neg           SOCIAL HISTORY:     Social History     Socioeconomic History    Marital status:    Tobacco Use    Smoking status: Never    Smokeless tobacco: Never   Vaping Use    Vaping status: Never Used   Substance and Sexual Activity    Alcohol use: Yes     Comment: occasional    Drug use: Never     Social Drivers of Health     Financial Resource Strain: Low Risk  (9/25/2024)    Financial Resource Strain     Difficulty of Paying Living Expenses: Not hard at all     Med Affordability: No   Food Insecurity: No Food Insecurity (9/25/2024)    Food Insecurity     Food Insecurity: Never true   Transportation Needs: No Transportation Needs (9/25/2024)    Transportation Needs     Lack of Transportation: No   Physical Activity: Sufficiently Active (9/25/2024)    Exercise Vital Sign     Days of Exercise per Week: 6 days     Minutes of Exercise per Session: 40 min   Stress: No Stress Concern Present (9/25/2024)    Stress     Feeling of Stress : No   Social Connections: Socially Integrated (9/25/2024)    Social Connections     Frequency of Socialization  with Friends and Family: 3   Housing Stability: Low Risk  (9/25/2024)    Housing Stability     Housing Instability: No          REVIEW OF SYSTEMS:   Patient-reported ROS  Constitutional  Sleep Disturbance: admits  Chills: denies  Fever: denies  Weight Gain: denies  Weight Loss: denies   Cardiovascular  Chest Pain: denies  Irregular Heartbeat: denies   Respiratory  Painful Breathing: denies  Wheezing: denies   Gastrointestinal  Bowel Incontinence: denies  Heartburn: denies  Abdominal Pain: denies  Blood in Stool : denies  Rectal Pain: denies   Hematology  Easy Bruising: denies  Easy Bleeding: denies   Genitourinary  Difficulty Urinating: denies  Pelvic Pain: denies  Painful Urination: denies   Musculoskeletal  Joint Stiffness: admits  Painful Joints: admits  Tailbone Pain: denies  Swollen Joints: denies   Peripheral Vascular  Swelling of Legs/Feet: denies  Cold Extremities: denies   Skin  Open Sores: denies  Nodules or Lumps: denies  Rash: denies   Neurological  Loss of Strength Since last Visit: denies  Tingling/Numbness: admits  Balance: denies   Psychiatric  Anxiety: denies  Depressed Mood: denies        PHYSICAL EXAM:     General: No immediate distress  Head: Normocephalic/ Atraumatic  Eyes: Extra-occular movements intact.   Ears: No auricular hematoma or deformities  Mouth: No lesions or ulcerations  Heart: peripheral pulses intact. Normal capillary refill.   Lungs: Non-labored respirations  Abdomen: No abdominal guarding  Extremities: No lower extremity edema bilaterally   Skin: No lesions noted   Cognition: alert & oriented x 3, attentive, able to follow 2 step commands, comprehention intact, spontaneous speech intact  Psychiatric: Mood and affect appropriate    LABS:     Lab Results   Component Value Date     (H) 09/30/2024    A1C 7.4 (H) 09/30/2024     Lab Results   Component Value Date    WBC 6.1 05/14/2024    RBC 4.56 05/14/2024    HGB 14.1 05/14/2024    HCT 39.6 05/14/2024    MCV 86.8 05/14/2024     MCH 30.9 05/14/2024    MCHC 35.6 05/14/2024    RDW 12.5 05/14/2024    .0 05/14/2024     Lab Results   Component Value Date     (H) 05/14/2024    BUN 13 05/14/2024    BUNCREA 15.5 05/14/2024    CREATSERUM 0.84 05/14/2024    ANIONGAP 9 05/14/2024    GFRNAA 97 07/12/2021    GFRAA 113 07/12/2021    CA 9.8 05/14/2024    OSMOCALC 289 05/14/2024    ALKPHO 65 05/14/2024    AST 17 05/14/2024    ALT 14 05/14/2024    BILT 0.4 05/14/2024    TP 7.2 05/14/2024    ALB 4.5 05/14/2024    GLOBULIN 2.7 05/14/2024    AGRATIO 1.7 07/13/2022     05/14/2024    K 3.9 05/14/2024     05/14/2024    CO2 25.0 05/14/2024     No results found for: \"PTP\", \"PT\", \"INR\"  No results found for: \"VITD\", \"QVITD\", \"[id number]\"           [1] No Known Allergies

## 2024-12-10 ENCOUNTER — MED REC SCAN ONLY (OUTPATIENT)
Dept: PHYSICAL MEDICINE AND REHAB | Facility: CLINIC | Age: 69
End: 2024-12-10

## 2024-12-11 ENCOUNTER — OFFICE VISIT (OUTPATIENT)
Dept: PHYSICAL MEDICINE AND REHAB | Facility: CLINIC | Age: 69
End: 2024-12-11
Payer: MEDICARE

## 2024-12-11 VITALS — BODY MASS INDEX: 34.72 KG/M2 | HEIGHT: 66 IN | WEIGHT: 216 LBS

## 2024-12-11 DIAGNOSIS — M79.18 CERVICAL MYOFASCIAL PAIN SYNDROME: ICD-10-CM

## 2024-12-11 DIAGNOSIS — M47.812 CERVICAL FACET JOINT SYNDROME: Primary | ICD-10-CM

## 2024-12-11 PROCEDURE — 99213 OFFICE O/P EST LOW 20 MIN: CPT | Performed by: PHYSICAL MEDICINE & REHABILITATION

## 2024-12-11 PROCEDURE — 3008F BODY MASS INDEX DOCD: CPT | Performed by: PHYSICAL MEDICINE & REHABILITATION

## 2024-12-11 PROCEDURE — 1159F MED LIST DOCD IN RCRD: CPT | Performed by: PHYSICAL MEDICINE & REHABILITATION

## 2024-12-11 PROCEDURE — 1160F RVW MEDS BY RX/DR IN RCRD: CPT | Performed by: PHYSICAL MEDICINE & REHABILITATION

## 2024-12-28 NOTE — PROGRESS NOTES
[facility]  FOLLOW UP EVALUATION        HISTORY OF PRESENT ILLNESS:     Chief Complaint   Patient presents with    Follow - Up     LOV 11/13/24 pt is here for a follow up on neck pain. No N/T. Completed the meloxicam but states he didn't get any relief. Currently in physical therapy and is getting relief. Pain 1/10        The patient is a 69 year old male with significant past medical history of hypertension, hyperlipidemia, type 2 diabetes, rotator cuff tear s/p repair who presents for follow-up evaluation of neck pain.  Since last visit he has noted great improvement with physical therapy and home exercises.  He rates pain to be a mild discomfort 1 out of 10.  He denies any changes in strength or bowel bladder.  Overall he is happy with his progress.      PHYSICAL EXAM:   Ht 66\"   Wt 216 lb (98 kg)   BMI 34.86 kg/m²     Gait: Normal    CERVICAL SPINE:  Inspection: no erythema, swelling, or obvious deformity  Palpation: no ttp over spinous process tenderness to palpation of the cervical paraspinals, upper trap and levator scapulae bilaterally with trigger points noted  ROM: intact to all planes of motion of cervical spine including side-bend bilaterally, rotation bilaterally, flexion, and extension   Strength: 5/5 in upper extremities bilaterally  Sensation: Intact to light touch in all dermatomes of the bilateral upper extremities  Reflexes: 2/4 at C5, C6, C7 bilaterally  Spurling Test: negative for radicular symptoms down either extremity bilaterally  Jeronimo's sign: negative bilaterally      IMAGING:     MRI Cervical spine completed 10/31/2024 was personally reviewed which is notable for small central disc protrusion at C2-3 and facet arthrosis with small central disc protrusion at C3-4 and C4-5 with a disc osteophyte complex and C5-6 similarly and a small central disc protrusion at C6-7 with no significant spinal or foraminal narrowing at any level.    All  imaging results were reviewed and discussed with patient.      ASSESSMENT/PLAN:     1. Cervical facet joint syndrome    2. Cervical myofascial pain syndrome        Bhargav Love a 69-year-old male presenting today for follow-up evaluation of neck pain with cervical facet joint syndrome and cervical myofascial symptoms.  I recommended that he follow-up as needed in the future and continue and finish his PT program with home exercises.  He will continue topical treatment and Tylenol as needed for pain.    The patient verbalized understanding with the plan and was in agreement. All questions/concerns were addressed and there were no barriers to learning.  Please note Dragon dictation software was used to dictate this note and can result in inadvertent typos.    Angela Talavera D.O. 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+ years ago    Hyperlipidemia    Type 2 diabetes mellitus without complication, without long-term current use of insulin (HCC)    Visual impairment    glasses         PAST SURGICAL HISTORY:     Past Surgical History:   Procedure Laterality Date    Colonoscopy N/A 4/19/2018    Procedure: COLONOSCOPY;  Surgeon: Todd Yarbrough MD;  Location: [facility] ENDOSCOPY    Other      left arm fracture repair         CURRENT MEDICATIONS:     Current Outpatient Medications   Medication Sig Dispense Refill    SILDENAFIL CITRATE 100 MG Oral Tab Take 1 tablet (100 mg total) by mouth daily as needed for Erectile Dysfunction. 20 tablet 0    losartan 25 MG Oral Tab Take 1 tablet (25 mg total) by mouth daily. 90 tablet 1    metFORMIN HCl 1000 MG Oral Tab TOME 1 TABLETA DOS VECES AL MICHAEL CON LAS COMIDAS 180 tablet 1    simvastatin 20 MG Oral Tab Take 1 tablet (20 mg total) by mouth nightly. 90 tablet 1         ALLERGIES:   Allergies[1]      FAMILY HISTORY:      Family History   Problem Relation Age of Onset    Cancer Father     Diabetes Mother     Cataracts Sister     Glaucoma Neg     Macular degeneration Neg           SOCIAL HISTORY:     Social History     Socioeconomic History    Marital status:    Tobacco Use    Smoking status: Never    Smokeless tobacco: Never   Vaping Use    Vaping status: Never Used   Substance and Sexual Activity    Alcohol use: Yes     Comment: occasional    Drug use: Never     Social Drivers of Health     Financial Resource Strain: Low Risk  (9/25/2024)    Financial Resource Strain     Difficulty of Paying Living Expenses: Not hard at all     Med Affordability: No   Food Insecurity: No Food Insecurity (9/25/2024)    Food Insecurity     Food Insecurity: Never true   Transportation Needs: No Transportation Needs (9/25/2024)    Transportation Needs     Lack of Transportation: No   Physical Activity: Sufficiently Active (9/25/2024)    Exercise Vital Sign     Days of Exercise per Week: 6 days     Minutes of Exercise per Session: 40 min   Stress: No Stress Concern Present (9/25/2024)    Stress     Feeling of Stress : No   Social Connections: Socially Integrated (9/25/2024)    Social Connections     Frequency of Socialization with Friends and Family: 3   Housing Stability: Low Risk  (9/25/2024)    Housing Stability     Housing Instability: No          REVIEW OF SYSTEMS:   Patient-reported ROS  Constitutional  Sleep Disturbance: admits  Chills: denies  Fever: denies  Weight Gain: denies  Weight Loss: denies   Cardiovascular  Chest Pain: denies  Irregular Heartbeat: denies   Respiratory  Painful Breathing: denies  Wheezing: denies   Gastrointestinal  Bowel Incontinence: denies  Heartburn: denies  Abdominal Pain: denies  Blood in Stool : denies  Rectal Pain: denies   Hematology  Easy Bruising: denies  Easy Bleeding: denies   Genitourinary  Difficulty Urinating: denies  Pelvic Pain: denies  Painful Urination: denies   Musculoskeletal  Joint  Stiffness: admits  Painful Joints: admits  Tailbone Pain: denies  Swollen Joints: denies   Peripheral Vascular  Swelling of Legs/Feet: denies  Cold Extremities: denies   Skin  Open Sores: denies  Nodules or Lumps: denies  Rash: denies   Neurological  Loss of Strength Since last Visit: denies  Tingling/Numbness: admits  Balance: denies   Psychiatric  Anxiety: denies  Depressed Mood: denies        PHYSICAL EXAM:     General: No immediate distress  Head: Normocephalic/ Atraumatic  Eyes: Extra-occular movements intact.   Ears: No auricular hematoma or deformities  Mouth: No lesions or ulcerations  Heart: peripheral pulses intact. Normal capillary refill.   Lungs: Non-labored respirations  Abdomen: No abdominal guarding  Extremities: No lower extremity edema bilaterally   Skin: No lesions noted   Cognition: alert & oriented x 3, attentive, able to follow 2 step commands, comprehention intact, spontaneous speech intact  Psychiatric: Mood and affect appropriate    LABS:     Lab Results   Component Value Date     (H) 09/30/2024    A1C 7.4 (H) 09/30/2024     Lab Results   Component Value Date    WBC 6.1 05/14/2024    RBC 4.56 05/14/2024    HGB 14.1 05/14/2024    HCT 39.6 05/14/2024    MCV 86.8 05/14/2024    MCH 30.9 05/14/2024    MCHC 35.6 05/14/2024    RDW 12.5 05/14/2024    .0 05/14/2024     Lab Results   Component Value Date     (H) 05/14/2024    BUN 13 05/14/2024    BUNCREA 15.5 05/14/2024    CREATSERUM 0.84 05/14/2024    ANIONGAP 9 05/14/2024    GFRNAA 97 07/12/2021    GFRAA 113 07/12/2021    CA 9.8 05/14/2024    OSMOCALC 289 05/14/2024    ALKPHO 65 05/14/2024    AST 17 05/14/2024    ALT 14 05/14/2024    BILT 0.4 05/14/2024    TP 7.2 05/14/2024    ALB 4.5 05/14/2024    GLOBULIN 2.7 05/14/2024    AGRATIO 1.7 07/13/2022     05/14/2024    K 3.9 05/14/2024     05/14/2024    CO2 25.0 05/14/2024     No results found for: \"PTP\", \"PT\", \"INR\"  No results found for: \"VITD\", \"QVITD\",  \"[id number]\"           [1] No Known Allergies

## 2024-12-30 ENCOUNTER — TELEPHONE (OUTPATIENT)
Dept: INTERNAL MEDICINE CLINIC | Facility: CLINIC | Age: 69
End: 2024-12-30

## 2024-12-30 DIAGNOSIS — E11.9 TYPE 2 DIABETES MELLITUS WITHOUT COMPLICATION, WITHOUT LONG-TERM CURRENT USE OF INSULIN (HCC): ICD-10-CM

## 2024-12-30 RX ORDER — SIMVASTATIN 20 MG
20 TABLET ORAL NIGHTLY
Qty: 90 TABLET | Refills: 1 | Status: SHIPPED | OUTPATIENT
Start: 2024-12-30

## 2025-01-02 ENCOUNTER — PATIENT OUTREACH (OUTPATIENT)
Dept: CASE MANAGEMENT | Age: 70
End: 2025-01-02

## 2025-01-02 DIAGNOSIS — I10 ESSENTIAL HYPERTENSION: ICD-10-CM

## 2025-01-02 DIAGNOSIS — E11.65 TYPE 2 DIABETES MELLITUS WITH HYPERGLYCEMIA, WITHOUT LONG-TERM CURRENT USE OF INSULIN (HCC): Primary | ICD-10-CM

## 2025-01-02 DIAGNOSIS — E11.9 TYPE 2 DIABETES MELLITUS WITHOUT COMPLICATION, WITHOUT LONG-TERM CURRENT USE OF INSULIN (HCC): ICD-10-CM

## 2025-01-02 DIAGNOSIS — E78.2 MIXED HYPERLIPIDEMIA: ICD-10-CM

## 2025-01-02 DIAGNOSIS — Z86.008 HISTORY OF CARCINOMA IN SITU OF BLADDER: ICD-10-CM

## 2025-01-02 NOTE — TELEPHONE ENCOUNTER
Refills denied, please schedule patient for March for medicare annual physical.     Last refill for metformin 10/2024 qty 180 + 1 refill. Spoke with patient and he needs to contact his pharmacy again. Call dropped.

## 2025-01-02 NOTE — PROGRESS NOTES
1/2/2025  Spoke to Bhargav for CCM.      Updates to patient care team/comments:  UTD/Reviewed with patient  Patient reported changes in medications: None   Med Adherence  Comment: taking as directed     Health Maintenance: UTD/Reviewed with patient  Health Maintenance   Topic Date Due    COVID-19 Vaccine (4 - 2024-25 season) 09/01/2024 reminded to get from local pharmacy    Influenza Vaccine (1) 10/01/2024 reminded to get from local pharmacy     Annual Well Visit  01/01/2025 done today    Annual Depression Screening  01/01/2025    Fall Risk Screening (Annual)  01/01/2025 done today    Diabetes Care: Foot Exam (Annual)  01/01/2025    Diabetes Care: Microalb/Creat Ratio (Annual)  01/01/2025    Diabetes Care A1C  03/30/2025    Diabetes Care: GFR  05/14/2025    PSA  01/17/2026    Diabetes Care Dilated Eye Exam  11/11/2026    Colorectal Cancer Screening  04/19/2028    Pneumococcal Vaccine: 65+ Years  Completed    Zoster Vaccines  Completed       Patient updates/concerns:     The patient was seen by Dr Talavera on 12/11/2024 as a follow up from his visit on 11/13/2024 with him, he was referred to that office by his PCP.  He was referred to see Dr Talavera by Dr Patricia after having an MRI done on 10/31/2024. The patient started physical therapy as a recommendation from Dr Talavera, he did do it at an outside facility and he has been finished with it already. The patient reports to [facility] to feel better and pain has improved. See Dr Talavera's recommendations form his visit on 12/11/24:    Instructions      Return if symptoms worsen or fail to improve.  -Follow up as needed  -Finish PT and continue home exercises        The patient was also evaluated by ophthalmologist, Dr Akers in the month of November, he was found to not have diabetic retinopathy, no other concerns were noted in his consult.    The patient reports to [facility] to be doing well and feeling fine and has no concerns at the moment.    The patient needed a refill on his  Metformin to be sent to his local pharmacy and not to the mail order, he will make sure his prescription gets transferred to [facility] from [facility].       Goals/Action Plan:    Active goal from previous outreach:  to feel good, continue to improve health     Patient reported progress towards goals: ongoing and working towards goal               - What: feel better, improve his health           - Where/When/How: eat healthier, take medications as prescribed, he is to do home exercises on his neck  Patient Reported Barriers and Concerns: None                   - Plan for overcoming barriers: N/A    Care Managers Interventions:     Advised patient to focus on eating healthier meals, keep doctor's appointment, and increase exercise.     Continued to provide education on how to better manage and cope with chronic conditions. Informed of the importance on reporting any new symptoms to prevent any health complications    CCM encouraged patient to take the time to enjoy the things he  likes to do.    CCM motivates patient to stay active and be as productive as possible to stay healthy and prevent further health complications.    Reconciled the patient's chart using care everywhere.    Updated health maintenance: Recommended patient about getting shingrix and covid vaccine from local pharmacy.     Immunization Query completed: No updates available.    Encouraged patient to call as needed.     Future Appointments:   Future Appointments   Date Time Provider Department Center   4/14/2025 11:00 AM Wali Patricia MD [facility]       Next Care Manager Follow Up Date: in a month or sooner if needed.    Reason For Follow Up: review progress and or barriers towards patient's goals.     Time Spent This Encounter Total: 20 min medical record review, telephone communication, care plan updates where needed, education, goals, and action plan recreation/update. Provided acknowledgment and validation to patient's concerns.    Monthly Minute Total including today: 20  Physical assessment, complete health history, and need for CCM established by Wali Patricia MD.

## 2025-03-03 ENCOUNTER — PATIENT OUTREACH (OUTPATIENT)
Dept: CASE MANAGEMENT | Age: 70
End: 2025-03-03

## 2025-03-03 NOTE — PROGRESS NOTES
CCM contacted the patient for monthly call, he is currently out of the country and will be back around 03/27. CCM to call patient back once he returns to [place].    Total time - 4 min  Total Monthly time- 4 min

## 2025-03-17 DIAGNOSIS — E11.9 TYPE 2 DIABETES MELLITUS WITHOUT COMPLICATION, WITHOUT LONG-TERM CURRENT USE OF INSULIN (HCC): ICD-10-CM

## 2025-03-17 RX ORDER — LOSARTAN POTASSIUM 25 MG/1
25 TABLET ORAL DAILY
Qty: 90 TABLET | Refills: 1 | Status: SHIPPED | OUTPATIENT
Start: 2025-03-17

## 2025-03-26 DIAGNOSIS — N52.9 VASCULOGENIC ERECTILE DYSFUNCTION, UNSPECIFIED VASCULOGENIC ERECTILE DYSFUNCTION TYPE: Primary | ICD-10-CM

## 2025-03-27 RX ORDER — SILDENAFIL 100 MG/1
100 TABLET, FILM COATED ORAL DAILY PRN
Qty: 20 TABLET | Refills: 0 | Status: SHIPPED | OUTPATIENT
Start: 2025-03-27

## 2025-04-01 ENCOUNTER — OFFICE VISIT (OUTPATIENT)
Age: 70
End: 2025-04-01
Payer: MEDICARE

## 2025-04-01 ENCOUNTER — LAB ENCOUNTER (OUTPATIENT)
Dept: LAB | Age: 70
End: 2025-04-01
Attending: INTERNAL MEDICINE
Payer: MEDICARE

## 2025-04-01 VITALS
DIASTOLIC BLOOD PRESSURE: 70 MMHG | BODY MASS INDEX: 31.5 KG/M2 | SYSTOLIC BLOOD PRESSURE: 100 MMHG | HEIGHT: 66 IN | HEART RATE: 73 BPM | TEMPERATURE: 97 F | OXYGEN SATURATION: 98 % | WEIGHT: 196 LBS

## 2025-04-01 DIAGNOSIS — L43.9 LICHEN PLANUS: ICD-10-CM

## 2025-04-01 DIAGNOSIS — E78.2 MIXED HYPERLIPIDEMIA: ICD-10-CM

## 2025-04-01 DIAGNOSIS — E11.65 TYPE 2 DIABETES MELLITUS WITH HYPERGLYCEMIA, WITHOUT LONG-TERM CURRENT USE OF INSULIN (HCC): ICD-10-CM

## 2025-04-01 DIAGNOSIS — M25.522 LEFT ELBOW PAIN: ICD-10-CM

## 2025-04-01 DIAGNOSIS — I10 ESSENTIAL HYPERTENSION: ICD-10-CM

## 2025-04-01 DIAGNOSIS — H52.4 HYPEROPIA WITH ASTIGMATISM AND PRESBYOPIA, BILATERAL: ICD-10-CM

## 2025-04-01 DIAGNOSIS — Z86.008 HISTORY OF CARCINOMA IN SITU OF BLADDER: ICD-10-CM

## 2025-04-01 DIAGNOSIS — S59.902S ELBOW INJURY, LEFT, SEQUELA: ICD-10-CM

## 2025-04-01 DIAGNOSIS — H25.13 AGE-RELATED NUCLEAR CATARACT OF BOTH EYES: ICD-10-CM

## 2025-04-01 DIAGNOSIS — Z00.00 MEDICARE ANNUAL WELLNESS VISIT, SUBSEQUENT: Primary | ICD-10-CM

## 2025-04-01 DIAGNOSIS — H43.393 FLOATER, VITREOUS, BILATERAL: ICD-10-CM

## 2025-04-01 DIAGNOSIS — Z00.00 ENCOUNTER FOR ANNUAL HEALTH EXAMINATION: ICD-10-CM

## 2025-04-01 DIAGNOSIS — H52.03 HYPEROPIA WITH ASTIGMATISM AND PRESBYOPIA, BILATERAL: ICD-10-CM

## 2025-04-01 DIAGNOSIS — N52.9 VASCULOGENIC ERECTILE DYSFUNCTION, UNSPECIFIED VASCULOGENIC ERECTILE DYSFUNCTION TYPE: ICD-10-CM

## 2025-04-01 DIAGNOSIS — H52.203 HYPEROPIA WITH ASTIGMATISM AND PRESBYOPIA, BILATERAL: ICD-10-CM

## 2025-04-01 LAB
CARTRIDGE EXPIRATION DATE: ABNORMAL DATE
CREAT UR-SCNC: 16.7 MG/DL
HEMOGLOBIN A1C: 9 % (ref 4.3–5.6)
MICROALBUMIN UR-MCNC: <0.3 MG/DL

## 2025-04-01 PROCEDURE — 3008F BODY MASS INDEX DOCD: CPT | Performed by: INTERNAL MEDICINE

## 2025-04-01 PROCEDURE — 82570 ASSAY OF URINE CREATININE: CPT | Performed by: INTERNAL MEDICINE

## 2025-04-01 PROCEDURE — 99213 OFFICE O/P EST LOW 20 MIN: CPT | Performed by: INTERNAL MEDICINE

## 2025-04-01 PROCEDURE — 3061F NEG MICROALBUMINURIA REV: CPT | Performed by: INTERNAL MEDICINE

## 2025-04-01 PROCEDURE — 1125F AMNT PAIN NOTED PAIN PRSNT: CPT | Performed by: INTERNAL MEDICINE

## 2025-04-01 PROCEDURE — 1159F MED LIST DOCD IN RCRD: CPT | Performed by: INTERNAL MEDICINE

## 2025-04-01 PROCEDURE — 83036 HEMOGLOBIN GLYCOSYLATED A1C: CPT | Performed by: INTERNAL MEDICINE

## 2025-04-01 PROCEDURE — 1160F RVW MEDS BY RX/DR IN RCRD: CPT | Performed by: INTERNAL MEDICINE

## 2025-04-01 PROCEDURE — 1170F FXNL STATUS ASSESSED: CPT | Performed by: INTERNAL MEDICINE

## 2025-04-01 PROCEDURE — 82043 UR ALBUMIN QUANTITATIVE: CPT | Performed by: INTERNAL MEDICINE

## 2025-04-01 PROCEDURE — 3078F DIAST BP <80 MM HG: CPT | Performed by: INTERNAL MEDICINE

## 2025-04-01 PROCEDURE — 3074F SYST BP LT 130 MM HG: CPT | Performed by: INTERNAL MEDICINE

## 2025-04-01 PROCEDURE — 99499 UNLISTED E&M SERVICE: CPT | Performed by: INTERNAL MEDICINE

## 2025-04-01 PROCEDURE — G0439 PPPS, SUBSEQ VISIT: HCPCS | Performed by: INTERNAL MEDICINE

## 2025-04-01 PROCEDURE — 3052F HG A1C>EQUAL 8.0%<EQUAL 9.0%: CPT | Performed by: INTERNAL MEDICINE

## 2025-04-01 PROCEDURE — 96160 PT-FOCUSED HLTH RISK ASSMT: CPT | Performed by: INTERNAL MEDICINE

## 2025-04-01 RX ORDER — SILDENAFIL 100 MG/1
100 TABLET, FILM COATED ORAL DAILY PRN
Qty: 20 TABLET | Refills: 3 | Status: SHIPPED | OUTPATIENT
Start: 2025-04-01

## 2025-04-02 DIAGNOSIS — E11.65 TYPE 2 DIABETES MELLITUS WITH HYPERGLYCEMIA, WITHOUT LONG-TERM CURRENT USE OF INSULIN (HCC): Primary | ICD-10-CM

## 2025-04-02 RX ORDER — LANCETS
1 EACH MISCELLANEOUS 3 TIMES DAILY
Qty: 100 EACH | Refills: 2 | Status: SHIPPED | OUTPATIENT
Start: 2025-04-02 | End: 2026-04-02

## 2025-04-02 RX ORDER — BLOOD SUGAR DIAGNOSTIC
STRIP MISCELLANEOUS
Qty: 300 EACH | Refills: 3 | Status: SHIPPED | OUTPATIENT
Start: 2025-04-02

## 2025-04-02 RX ORDER — BLOOD-GLUCOSE METER
1 EACH MISCELLANEOUS 3 TIMES DAILY
Qty: 1 KIT | Refills: 1 | Status: SHIPPED | OUTPATIENT
Start: 2025-04-02

## 2025-04-02 NOTE — TELEPHONE ENCOUNTER
Pt is calling stating that on yesterday's visit forgot to requesting strips to check blood sugar.       [facility]   [address] [place] [address]

## 2025-04-03 ENCOUNTER — TELEPHONE (OUTPATIENT)
Age: 70
End: 2025-04-03

## 2025-04-03 NOTE — TELEPHONE ENCOUNTER
Markyo from [facility] has call to request prior imaging results. Xray, CT or MRI  physical findings and physical notes to be uploaded via the portal or faxed to [phone]. Peer to peer at [phone]

## 2025-04-03 NOTE — PROGRESS NOTES
Subjective:   Bhargav Love is a 69 year old male who presents for a MA AHA (Medicare Advantage Annual Health Assessment) and scheduled follow up of multiple significant but stable problems.       History/Other:   Fall Risk Assessment:   He has been screened for Falls and is low risk.      Cognitive Assessment:   He had a completely normal cognitive assessment - see flowsheet entries     Functional Ability/Status:   Bhargav Love has a completely normal functional assessment. See flowsheet for details.        Depression Screening (PHQ):  PHQ-2 SCORE: 0  , done 4/1/2025             Advanced Directives:   He does NOT have a Living Will. [Do you have a living will?: No]  He does NOT have a Power of  for Health Care. [Do you have a healthcare power of ?: No]  Not discussed      Patient Active Problem List   Diagnosis    Lichen planus    History of carcinoma in situ of bladder    Age-related nuclear cataract of both eyes    Floater, vitreous, bilateral    Hyperopia with astigmatism and presbyopia, bilateral    Essential hypertension    Hyperlipidemia    Type 2 diabetes mellitus with hyperglycemia (HCC)     Allergies:  He has No Known Allergies.    Current Medications:  Outpatient Medications Marked as Taking for the 4/1/25 encounter (Office Visit) with Wali Patricia MD   Medication Sig    Sildenafil Citrate 100 MG Oral Tab Take 1 tablet (100 mg total) by mouth daily as needed for Erectile Dysfunction.       Medical History:  He  has a past medical history of Essential hypertension, History of carcinoma in situ of bladder, Hyperlipidemia, Type 2 diabetes mellitus without complication, without long-term current use of insulin (HCC), and Visual impairment.  Surgical History:  He  has a past surgical history that includes other and colonoscopy (N/A, 4/19/2018).   Family History:  His family history includes Cancer in his father; Cataracts in his sister; Diabetes in his mother.  Social History:  He   reports that he has never smoked. He has never used smokeless tobacco. He reports current alcohol use. He reports that he does not use drugs.    Tobacco:  He has never smoked tobacco.    CAGE Alcohol Screen:   CAGE screening score of 0 on 4/1/2025, showing low risk of alcohol abuse.      Patient Care Team:  Wali Patricia MD as PCP - General (Internal Medicine)  Lucia Castro RMA as [facility] Care Manager  Duy Akers MD as Referring Physician (OPHTHALMOLOGY)    Review of Systems  GENERAL: feels well otherwise  SKIN: denies any unusual skin lesions  EYES: denies blurred vision or double vision  HEENT: denies nasal congestion, sinus pain or ST  LUNGS: denies shortness of breath with exertion  CARDIOVASCULAR: denies chest pain on exertion  GI: denies abdominal pain, denies heartburn  : 0 per night nocturia, no complaint of urinary incontinence  MUSCULOSKELETAL: elbow pain.     NEURO: denies headaches  PSYCHE: denies depression or anxiety  HEMATOLOGIC: denies hx of anemia  ENDOCRINE: denies thyroid history  ALL/ASTHMA: denies hx of allergy or asthma    Objective:   Physical Exam  General Appearance:  Alert, cooperative, no distress, appears stated age   Head:  Normocephalic, without obvious abnormality, atraumatic   Eyes:  PERRL, conjunctiva/corneas clear, EOM's intact, both eyes   Ears:  Normal TM's and external ear canals, both ears   Nose: Nares normal, septum midline, mucosa normal, no drainage or sinus tenderness   Throat: Lips, mucosa, and tongue normal; teeth and gums normal   Neck: Supple, symmetrical, trachea midline, no adenopathy, thyroid: not enlarged, symmetric, no tenderness/mass/nodules, no carotid bruit or JVD   Back:   Symmetric, no curvature, ROM normal, no CVA tenderness   Lungs:   Clear to auscultation bilaterally, respirations unlabored   Chest Wall:  No tenderness or deformity   Heart:  Regular rate and rhythm, S1, S2 normal, no murmur, rub or gallop   Abdomen:   Soft, non-tender, bowel  sounds active all four quadrants,  no masses, no organomegaly   Genitalia: Normal male   Rectal: Normal tone, normal prostate, no masses or tenderness   Extremities: Extremities normal, atraumatic, no cyanosis or edema   Pulses: 2+ and symmetric   Skin: Skin color, texture, turgor normal, no rashes or lesions   Lymph nodes: Cervical, supraclavicular, and axillary nodes normal   Neurologic: Normal     /70   Pulse 73   Temp 97.3 °F (36.3 °C)   Ht 5' 6\" (1.676 m)   Wt 196 lb (88.9 kg)   SpO2 98%   BMI 31.64 kg/m²  Estimated body mass index is 31.64 kg/m² as calculated from the following:    Height as of this encounter: 5' 6\" (1.676 m).    Weight as of this encounter: 196 lb (88.9 kg).    Medicare Hearing Assessment:   Hearing Screening    Screening Method: Whisper Test  Whisper Test Result: Pass         Visual Acuity:   Right Eye Visual Acuity: Corrected Right Eye Chart Acuity: 20/20   Left Eye Visual Acuity: Corrected Left Eye Chart Acuity: 20/20   Both Eyes Visual Acuity: Corrected Both Eyes Chart Acuity: 20/20   Able To Tolerate Visual Acuity: Yes        Assessment & Plan:   Bhargav Love is a 69 year old male who presents for a Medicare Assessment.     1. Medicare annual wellness visit, subsequent (Primary)  -  During the annual physical exam I spent extensive time discussing medical history including existing conditions, past surgeries, allergies and medications.  Lifestyle factors discussed including diet, exercise and substance abuse including alcohol and tobacco if warranted.  Counseled on screening tests based on age and underlying risk factors which may include but not limited to blood pressure measurement, cholesterol screening, diabetes screening and prostate cancer screening.  Preventative screenings discussed.  Reviewed immunizations.  Sexual health discussed if appropriate.  Family history reviewed.  I addressed any specific concerns or symptoms that the patient had.      2. Type 2  diabetes mellitus with hyperglycemia, without long-term current use of insulin ([facility])  -     POC Hemoglobin A1C 9.0.  Uncontrolled.   -     Microalb/Creat Ratio, Random Urine  -     Ophthalmology Referral - In Network    - extensive time discussing his uncontrolled DMII.  He was not following healthy diet.     -  Continue on metformin.      3. Vasculogenic erectile dysfunction, unspecified vasculogenic erectile dysfunction type  -     Sildenafil Citrate; Take 1 tablet (100 mg total) by mouth daily as needed for Erectile Dysfunction.  Dispense: 20 tablet; Refill: 3    4. Left elbow pain  -     MRI ELBOW, LEFT (CPT=73221); Future; Expected date: 04/01/2025  -   has had greater than 1 year of elbow pain after injury about 2 years ago.  He has trialed conservative treatment with nsaids and ICE.   Given severity of pain will proceed to MRI.     5. Elbow injury, left, sequela  -     MRI ELBOW, LEFT (CPT=73221); Future; Expected date: 04/01/2025    6. Essential hypertension  -  Controlled.  Continue Losartan.      7. Mixed hyperlipidemia    - On simvastatin.     8. History of carcinoma in situ of bladder   Stable.     9. Age-related nuclear cataract of both eyes  - stable.      10. Floater, vitreous, bilateral  - stable.     11. Hyperopia with astigmatism and presbyopia, bilateral  Stable.      12. Lichen planus   Stable.      13. Encounter for annual health examination    The patient indicates understanding of these issues and agrees to the plan.  Reinforced healthy diet, lifestyle, and exercise.      Return in about 3 months (around 7/1/2025) for Glucose monitoring.     Wali Patricia MD, 4/3/2025     Supplementary Documentation:   General Health:  In the past six months, have you lost more than 10 pounds without trying?: 3 - Don't know  Has your appetite been poor?: No  Type of Diet: Low Salt  How does the patient maintain a good energy level?: Daily Walks  How would you describe your daily physical activity?:  Light  How would you describe your current health state?: Fair  How do you maintain positive mental well-being?: Visiting Family  On a scale of 0 to 10, with 0 being no pain and 10 being severe pain, what is your pain level?: 1 - (Mild)  In the past six months, have you experienced urine leakage?: 0-No  At any time do you feel concerned for the safety/well-being of yourself and/or your children, in your home or elsewhere?: Yes  Have you had any immunizations at another office such as Influenza, Hepatitis B, Tetanus, or Pneumococcal?: No    Health Maintenance   Topic Date Due    COVID-19 Vaccine (4 - 2024-25 season) 09/01/2024    Annual Well Visit  01/01/2025    Diabetes Care: Foot Exam (Annual)  01/01/2025    Diabetes Care: GFR  05/14/2025    Diabetes Care A1C  07/01/2025    PSA  01/17/2026    Diabetes Care Dilated Eye Exam  11/11/2026    Colorectal Cancer Screening  04/19/2028    Influenza Vaccine  Completed    Annual Depression Screening  Completed    Fall Risk Screening (Annual)  Completed    Diabetes Care: Microalb/Creat Ratio (Annual)  Completed    Pneumococcal Vaccine: 50+ Years  Completed    Zoster Vaccines  Completed    Meningococcal B Vaccine  Aged Out

## 2025-04-08 ENCOUNTER — HOSPITAL ENCOUNTER (OUTPATIENT)
Dept: MRI IMAGING | Facility: HOSPITAL | Age: 70
Discharge: HOME | End: 2025-04-08
Attending: INTERNAL MEDICINE
Payer: MEDICARE

## 2025-04-08 DIAGNOSIS — M25.522 LEFT ELBOW PAIN: ICD-10-CM

## 2025-04-08 DIAGNOSIS — S59.902S ELBOW INJURY, LEFT, SEQUELA: ICD-10-CM

## 2025-04-08 PROCEDURE — 73221 MRI JOINT UPR EXTREM W/O DYE: CPT | Performed by: INTERNAL MEDICINE

## 2025-04-21 ENCOUNTER — PATIENT OUTREACH (OUTPATIENT)
Dept: CASE MANAGEMENT | Age: 70
End: 2025-04-21

## 2025-04-21 DIAGNOSIS — I10 ESSENTIAL HYPERTENSION: ICD-10-CM

## 2025-04-21 DIAGNOSIS — Z86.008 HISTORY OF CARCINOMA IN SITU OF BLADDER: ICD-10-CM

## 2025-04-21 DIAGNOSIS — E11.65 TYPE 2 DIABETES MELLITUS WITH HYPERGLYCEMIA, WITHOUT LONG-TERM CURRENT USE OF INSULIN (HCC): Primary | ICD-10-CM

## 2025-04-21 DIAGNOSIS — E78.2 MIXED HYPERLIPIDEMIA: ICD-10-CM

## 2025-04-21 DIAGNOSIS — H25.13 AGE-RELATED NUCLEAR CATARACT OF BOTH EYES: ICD-10-CM

## 2025-04-21 NOTE — PROGRESS NOTES
Attempt to reach Bhargav Love to do CCM Monthly call for April. Left message for patient to call CCM to do monthly.    Total time -  4 min.  Total Monthly time-  4 min.   Next Care Manager Follow Up Date: 2-4 Weeks.

## 2025-04-21 NOTE — PROGRESS NOTES
4/21/2025  Spoke to Bhargav for CCM.      Updates to patient care team/comments:  UTD/Reviewed with patient  Patient reported changes in medications: None   Med Adherence  Comment: taking as prescribed     Health Maintenance: UTD/Reviewed with patient  Health Maintenance   Topic Date Due    COVID-19 Vaccine (4 - 2024-25 season) 09/01/2024    Diabetes Care: Foot Exam (Annual)  01/01/2025    Diabetes Care: GFR  05/14/2025    Diabetes Care A1C  07/01/2025    PSA  01/17/2026    Diabetes Care Dilated Eye Exam  11/11/2026    Colorectal Cancer Screening  04/19/2028    Influenza Vaccine  Completed    Annual Well Visit  Completed    Annual Depression Screening  Completed    Fall Risk Screening (Annual)  Completed    Diabetes Care: Microalb/Creat Ratio (Annual)  Completed    Pneumococcal Vaccine: 50+ Years  Completed    Zoster Vaccines  Completed    Meningococcal B Vaccine  Aged Out       Patient updates/concerns:     The patient is reporting to CCM to be doing well, he is trying to behave and eat healthier to bring his sugars down. The patient is taking medications as prescribed and has no questions or concerns regarding his prescriptions.    The patient is checking his sugars once daily and reported to CCM that his sugars are ranging from 140-160 fasting, he is checking his sugars daily. The patient was uncontrolled at 180-200 before but has decreased those numbers, per Bhargav his glucose got uncontrolled because he was in [place] and was not following a diabetic diet. Now that the patient is back home he is plan is to go under a stricter diet and take medications as prescribed.    The patient was seen by PCP on 04/01, he had an A1c done during the visit and the results were higher at 9.0 compared to the one from September that was 7.4, no changes were made to current medications. Bhargav had his annual exam during recent visit. The patient was ordered an MRI by PCP, it was an MRI of his left elbow, Bhargav has been dealing with this  pain for a year approximately a year.    The patient went and had the MRI done on 04/08/2025 and the results showed a tear, he was referred by PCP to see Dr Elaine, orthopaedic. The patient is asking CCM to assist in scheduling the specialist appointment.    Goals/Action Plan:    Active goal from previous outreach:  be able to control better his sugar levels    Patient reported progress towards goals: ongoing and working towards his goal               - What: be able to control better his sugar levels           - Where/When/How: by having a better diet, exercise more and take medications as prescribed  Patient Reported Barriers and Concerns: None                   - Plan for overcoming barriers: N/A    Care Managers Interventions:     CCM called the office of Dr Elaine and per Bette an appointment is now scheduled for 06/09 at 1040am at the [place] location.  16 mins with Dr Elaine's office    Coordinated follow up appointment with Dr Elaine per patient's request.     CCM informed patient of the appointment being scheduled and he agreed to go, he is aware he needs to have an xray done an hour prior to the visit with Dr Elaine, order is in the system already.    Spoke at length, what foods to eat in moderation lifestyle changes, such as increasing exercises    Provided Bhargav education on importance of adherence for optimal benefit.     Listened to patient's concerns, provided support, and encouraged Bhargav to meet goals. Informed of the importance on reporting any new symptoms to prevent any health complications.    Discussed dietary modification such as portion control, meal choices, and timing of meals. Provided diabetic friendly food choices to consider when grocery shopping, recommended more whole grains, proteins, and vegetables.     Advised patient to focus on eating healthier meals, keep doctor's appointment, and increase exercise.     Continued to provide education on how to better manage and cope with chronic  conditions. Informed of the importance on reporting any new symptoms to prevent any health complications.     Future Appointments:   Future Appointments   Date Time Provider Department Center   7/8/2025 10:00 AM Wali Patricia MD [id number]     Next Care Manager Follow Up Date: in a month or sooner if needed.    Reason For Follow Up: review progress and or barriers towards patient's goals.     Time Spent This Encounter Total: 34 min medical record review, telephone communication, care plan updates where needed, education, goals, and action plan recreation/update. Provided acknowledgment and validation to patient's concerns.   Monthly Minute Total including today: 34  Physical assessment, complete health history, and need for CCM established by Wali Patricia MD.

## 2025-04-22 ENCOUNTER — TELEPHONE (OUTPATIENT)
Dept: ORTHOPEDICS CLINIC | Facility: CLINIC | Age: 70
End: 2025-04-22

## 2025-04-22 DIAGNOSIS — M25.522 LEFT ELBOW PAIN: Primary | ICD-10-CM

## 2025-04-22 NOTE — TELEPHONE ENCOUNTER
Patient scheduled with Dr Elaine for the left elbow. Please advise if xray is needed  Future Appointments   Date Time Provider Department Center   6/9/2025 10:40 AM Lila Elaine MD [facility] [place]   7/8/2025 10:00 AM [id number]

## 2025-04-22 NOTE — TELEPHONE ENCOUNTER
XR ordered per ortho protocol. XR scheduled and patient was notified via goOutMaphart to let them know that they should arrive 15-20 minutes early, in order for them to complete imaging.

## 2025-04-30 DIAGNOSIS — E11.9 TYPE 2 DIABETES MELLITUS WITHOUT COMPLICATION, WITHOUT LONG-TERM CURRENT USE OF INSULIN (HCC): ICD-10-CM

## 2025-05-30 ENCOUNTER — TELEPHONE (OUTPATIENT)
Dept: INTERNAL MEDICINE CLINIC | Facility: CLINIC | Age: 70
End: 2025-05-30

## 2025-06-02 NOTE — TELEPHONE ENCOUNTER
Reached patient via  for medication adherence consult. Per insurance report, patient has NOT filled simvastatin since Dec 2024.     Patient states he is still taking the simvastatin and has plenty of medication left still. Did provide education and stressed the importance of taking medication consistently each day to get the most benefit. Patient does admit to sometimes being unsure if he took his medication or not. Did recommend patient use a pillbox to organize his medication and keep it in a visible spot. Strongly encouraged patient to use up the supply he has and to contact the pharmacy for a refill before he runs out. Patient denies any other questions or concerns with medications at this time.

## 2025-06-09 ENCOUNTER — OFFICE VISIT (OUTPATIENT)
Dept: ORTHOPEDICS CLINIC | Facility: CLINIC | Age: 70
End: 2025-06-09
Payer: MEDICARE

## 2025-06-09 ENCOUNTER — HOSPITAL ENCOUNTER (OUTPATIENT)
Dept: GENERAL RADIOLOGY | Age: 70
Discharge: HOME | End: 2025-06-09
Attending: ORTHOPAEDIC SURGERY
Payer: MEDICARE

## 2025-06-09 VITALS — HEIGHT: 66 IN | WEIGHT: 196 LBS | BODY MASS INDEX: 31.5 KG/M2

## 2025-06-09 DIAGNOSIS — M25.522 LEFT ELBOW PAIN: ICD-10-CM

## 2025-06-09 DIAGNOSIS — M77.02 MEDIAL EPICONDYLITIS OF LEFT ELBOW: Primary | ICD-10-CM

## 2025-06-09 PROCEDURE — 1160F RVW MEDS BY RX/DR IN RCRD: CPT | Performed by: ORTHOPAEDIC SURGERY

## 2025-06-09 PROCEDURE — 73080 X-RAY EXAM OF ELBOW: CPT | Performed by: ORTHOPAEDIC SURGERY

## 2025-06-09 PROCEDURE — 99214 OFFICE O/P EST MOD 30 MIN: CPT | Performed by: ORTHOPAEDIC SURGERY

## 2025-06-09 PROCEDURE — 1159F MED LIST DOCD IN RCRD: CPT | Performed by: ORTHOPAEDIC SURGERY

## 2025-06-09 PROCEDURE — 3008F BODY MASS INDEX DOCD: CPT | Performed by: ORTHOPAEDIC SURGERY

## 2025-06-09 NOTE — PROGRESS NOTES
[facility] Orthopaedic New Patient Note    Entire encounter is facilitated by language line interpretation for [place]         The following individual(s) verbally consented to be recorded using ambient AI listening technology and understand that they can each withdraw their consent to this listening technology at any point by asking the clinician to turn off or pause the recording:    Patient name: Bhargav Love     Chief Complaint   Patient presents with    Elbow Pain     LEFT ELBOW  -Hx of cortisone injection and surgery in 2016  -Started experiencing pain 6 months ago  -Recent mri ordered by pcp     History of Present Illness  Bhargav Love is a 69 year old male who presents with left elbow pain.    He experiences pain in the left elbow at the medial aspect. The pain is occasionally accompanied by swelling and a sensation of the bone protruding slightly.    He underwent surgery on the elbow in 2016 to address numbness in his fingers, which successfully resolved the numbness. He is right-handed, and the pain has started to interfere with activities such as yard work, which require gripping and lifting.    An MRI was performed, but he is unaware of the results. No pain in other areas of the elbow and he demonstrates full range of motion.    He is retired but engages in activities at home and in the yard that involve using his hands.    Past Medical History[1]  Past Surgical History[2]  Current Medications[3]  Allergies[4]  Family History[5]  Social History     Occupational History    Not on file   Tobacco Use    Smoking status: Never    Smokeless tobacco: Never   Vaping Use    Vaping status: Never Used   Substance and Sexual Activity    Alcohol use: Yes     Comment: occasional    Drug use: Never    Sexual activity: Not on file        ROS:  Complete ROS reviewed by me and non-contributory to the chief complaint except as mentioned above.    Physical Exam:    Ht 5' 6\" (1.676 m)   Wt 196 lb (88.9 kg)    BMI 31.64 kg/m²   Constitutional: Well developed, well nourished 69 year old male  Psychological: NAD, alert and appropriate  Respiratory: Breathing comfortably on room air with RR of 10-14  Cardiac: Palpable distal pulses with pink warm extremities distally  On examination, left elbow appears benign with no discoloration, but fullness at the medial epicondyle with a posterior medial surgical scar consistent with ulnar nerve transposition. Adequate full active range of motion is noted in flexion, extension, supination and pronation. There is point tenderness at the medial epicondyle extending slightly distal. Classic signs of pain are noted on resisted wrist flexion and pronation.  Cubital tunnel Tinel's is benign.  Elbow hyperflexion test is negative. The lateral epicondyle, distal biceps tendon and triceps insertions are nontender.  Full strength is noted on resisted manual muscle testing with no instability on varus valgus stress.  Neurovascular status is intact on sensory, motor and perfusion assessment distally.    Imaging: Multiple views of the involved elbow personally viewed, demonstrating no fracture dislocation or acute bony abnormalities.        Assessment/Diagnoses:  Diagnoses and all orders for this visit:    Medial epicondylitis of left elbow      Plan:  I reviewed imaging and exam findings with the patient.  History and physical exam are fairly classic for medial epicondylitis.  We discussed at length the pathophysiology and mechanism of injury to the flexor pronator origin.  Treatment options were reviewed including activity modification to avoid supinated palm up lifting, repetitive gripping, squeezing and pulling activities.  We also discussed the use of over-the-counter anti-inflammatories, icing and flexor pronator compartment stretching exercises.  These were reviewed and demonstrated in detail.  Formal physical therapy was also discussed and offered, although starting with a home program may be  acceptable.  Finally, the use of a counterforce brace was also reviewed and recommended.  The patient should expect gradual improvement with conservative care with over the next 6 to 8 weeks.  I counseled the patient that it may take much longer for complete resolution, but improvement should be noticeable within the first few months.  Patient education handout was also offered.  All questions were answered the patient verbalized understanding and agreement with this initial conservative treatment plan.      - Advise rest and avoidance of heavy lifting or gripping activities.  - Recommend icing the elbow to reduce inflammation.  - Suggest use of topical anti-inflammatory medications.  - Provide instructions for gentle stretching exercises, particularly in a hot shower.  - Discuss the optional use of a brace or strap brace for support.  - Offer physical therapy as an optional treatment, but he prefers to start with home exercises and hot water therapy.    Follow-up if symptoms do not improve.     Lila Elaine MD, [facility] Department of Orthopaedics  Phone [phone]  Fax [phone]      This document was partially prepared using Dragon Medical voice recognition software.  Although every attempt is made to correct errors during dictation, discrepancies may still exist.                [1]   Past Medical History:   Essential hypertension    History of carcinoma in situ of bladder    20+ years ago    Hyperlipidemia    Type 2 diabetes mellitus without complication, without long-term current use of insulin (HCC)    Visual impairment    glasses   [2]   Past Surgical History:  Procedure Laterality Date    Colonoscopy N/A 4/19/2018    Procedure: COLONOSCOPY;  Surgeon: Todd Yarbrough MD;  Location: [facility] ENDOSCOPY    Other      left arm fracture repair   [3]   Current Outpatient Medications   Medication Sig Dispense Refill    metFORMIN HCl 1000 MG Oral Tab TOME 1 TABLETA DOS VECES AL MICHAEL CON LAS COMIDAS JERARDO  INDICADO 180 tablet 3    Accu-Chek Softclix Lancets Does not apply Misc 1 Lancet by Finger stick route in the morning, at noon, and at bedtime. Use as directed. 100 each 2    Glucose Blood (ACCU-CHEK GUIDE TEST) In Vitro Strip Check three times daily 300 each 3    Blood Glucose Monitoring Suppl (ACCU-CHEK GUIDE ME) w/Device Does not apply Kit 1 Application in the morning, at noon, and at bedtime. 1 kit 1    Sildenafil Citrate 100 MG Oral Tab Take 1 tablet (100 mg total) by mouth daily as needed for Erectile Dysfunction. 20 tablet 3    losartan 25 MG Oral Tab Take 1 tablet (25 mg total) by mouth daily. 90 tablet 1    simvastatin 20 MG Oral Tab Take 1 tablet (20 mg total) by mouth nightly. 90 tablet 1   [4] No Known Allergies  [5]   Family History  Problem Relation Age of Onset    Cancer Father     Diabetes Mother     Cataracts Sister     Glaucoma Neg     Macular degeneration Neg

## 2025-06-12 ENCOUNTER — PATIENT OUTREACH (OUTPATIENT)
Dept: CASE MANAGEMENT | Age: 70
End: 2025-06-12

## 2025-06-12 ENCOUNTER — TELEPHONE (OUTPATIENT)
Age: 70
End: 2025-06-12

## 2025-06-12 DIAGNOSIS — Z86.008 HISTORY OF CARCINOMA IN SITU OF BLADDER: ICD-10-CM

## 2025-06-12 DIAGNOSIS — I10 ESSENTIAL HYPERTENSION: ICD-10-CM

## 2025-06-12 DIAGNOSIS — E11.65 TYPE 2 DIABETES MELLITUS WITH HYPERGLYCEMIA, WITHOUT LONG-TERM CURRENT USE OF INSULIN (HCC): Primary | ICD-10-CM

## 2025-06-12 DIAGNOSIS — Z12.5 ENCOUNTER FOR SCREENING FOR MALIGNANT NEOPLASM OF PROSTATE: ICD-10-CM

## 2025-06-12 DIAGNOSIS — H25.13 AGE-RELATED NUCLEAR CATARACT OF BOTH EYES: ICD-10-CM

## 2025-06-12 DIAGNOSIS — E11.9 TYPE 2 DIABETES MELLITUS WITHOUT COMPLICATION, WITHOUT LONG-TERM CURRENT USE OF INSULIN (HCC): Primary | ICD-10-CM

## 2025-06-12 DIAGNOSIS — R31.29 MICROSCOPIC HEMATURIA: ICD-10-CM

## 2025-06-12 NOTE — PROGRESS NOTES
Attempt to reach Bhargav Choupando to do CCM Monthly call. Left message for patient to call CCM to do monthly.    Total time -  4 min.  Total Monthly time-  4 min.   Next Care Manager Follow Up Date: 2-4 Weeks.

## 2025-06-12 NOTE — PROGRESS NOTES
6/12/2025  Spoke to Bhargav for CCM.      Updates to patient care team/comments:  Added Dr Elaine, orthopaedic   Patient reported changes in medications: None  Med Adherence  Comment: taking as prescribed, he will make sure he has Simvastatin at home and if not will obtain from the pharmacy    Health Maintenance: UTD/Reviewed with patient  Health Maintenance   Topic Date Due    COVID-19 Vaccine (4 - 2024-25 season) 09/01/2024    Diabetes Care Foot Exam  01/17/2025    Diabetes Care: GFR  05/14/2025    Diabetes Care A1C  07/01/2025    PSA  01/17/2026    Diabetes Care Dilated Eye Exam  11/11/2026    Colorectal Cancer Screening  04/19/2028    Influenza Vaccine  Completed    Annual Well Visit  Completed    Annual Depression Screening  Completed    Fall Risk Screening (Annual)  Completed    Diabetes Care: Microalb/Creat Ratio (Annual)  Completed    Pneumococcal Vaccine: 50+ Years  Completed    Zoster Vaccines  Completed    Meningococcal B Vaccine  Aged Out       Patient updates/concerns:     The patient is reporting to CCM to be doing well and is taking medications as prescribed but is confused with one of them, he is unsure if he has any pills left on his cholesterol medication, he will check and if not will call pharmacy to obtain. [facility] check medication dispense report and last time he picked up the prescription was in December of 2024, he was also contacted by the pharmacist and was told the same thing. The patient agreed to call [facility] in case he needed refills.    The patient was seen by Dr Elaine, ortho surgeon, he was seen on 06/09/2025. The patient went in for elbow pain and because PCP referred him to see the specialist after the results from his MRI, per Bhargav he was offered by the specialist to start physical therapy but he preferred to start home exercises instead, he agreed to start therapy only if the pain persist. Bhargav reports to only experience pain if he lifts heavy things or uses his arms a lot, but he knows how to  make the pain get better and stops activities to rest his elbow making improvements on his pain and discomfort.    The patient will be seeing his PCP on 07/08 and would like to have blood work orders to be placed by his doctor, he wants to have tests done prior to he visit, he is asking [facility] to assist in requesting such orders.    The patient is unsure if he was already scheduled to see the ophthalmologist, he prefers [facility] to contact the office of Dr Akers to verify it.      Goals/Action Plan:     Active goal from previous outreach:  be able to control better his sugar levels     Patient reported progress towards goals: ongoing and working towards his goal               - What: be able to control better his sugar levels           - Where/When/How: by having a better diet, exercise more and take medications as prescribed  Patient Reported Barriers and Concerns: None                   - Plan for overcoming barriers: N/A    Care Managers Interventions:     [facility] called the [facility] and per Jeane he is not scheduled to see the doctor but was able to schedule him for 11/13 at 10am in the [place] location.    [facility] sent a message to PCP asking for lab orders to be placed.    The orders for labs were approved and the patient notified, he is aware that an appointment was scheduled for him to see Dr Akers in November and agreed to keep the appointment as scheduled.    Future Appointments:   Future Appointments   Date Time Provider Department Center   7/8/2025 10:00 AM Wlai Patricia MD [id number] Follow Up Date: in a month or sooner if needed.    Reason For Follow Up: review progress and or barriers towards patient's goals.     Time Spent This Encounter Total: 23 min medical record review, telephone communication, care plan updates where needed, education, goals, and action plan recreation/update. Provided acknowledgment and validation to patient's concerns.   Monthly  Minute Total including today: 23  Physical assessment, complete health history, and need for CCM established by Wali Patricia MD.

## 2025-07-01 ENCOUNTER — LAB ENCOUNTER (OUTPATIENT)
Dept: LAB | Age: 70
End: 2025-07-01
Attending: INTERNAL MEDICINE
Payer: MEDICARE

## 2025-07-01 DIAGNOSIS — Z12.5 ENCOUNTER FOR SCREENING FOR MALIGNANT NEOPLASM OF PROSTATE: ICD-10-CM

## 2025-07-01 LAB
ALBUMIN SERPL-MCNC: 4.5 G/DL (ref 3.2–4.8)
ALBUMIN/GLOB SERPL: 2 {RATIO} (ref 1–2)
ALP LIVER SERPL-CCNC: 58 U/L (ref 45–117)
ALT SERPL-CCNC: 15 U/L (ref 10–49)
ANION GAP SERPL CALC-SCNC: 7 MMOL/L (ref 0–18)
AST SERPL-CCNC: 17 U/L (ref ?–34)
BASOPHILS # BLD AUTO: 0.03 X10(3) UL (ref 0–0.2)
BASOPHILS NFR BLD AUTO: 0.6 %
BILIRUB SERPL-MCNC: 0.7 MG/DL (ref 0.2–1.1)
BUN BLD-MCNC: 11 MG/DL (ref 9–23)
BUN/CREAT SERPL: 12.6 (ref 10–20)
CALCIUM BLD-MCNC: 9.1 MG/DL (ref 8.7–10.4)
CHLORIDE SERPL-SCNC: 105 MMOL/L (ref 98–112)
CO2 SERPL-SCNC: 28 MMOL/L (ref 21–32)
COMPLEXED PSA SERPL-MCNC: 0.49 NG/ML (ref ?–4)
CREAT BLD-MCNC: 0.87 MG/DL (ref 0.7–1.3)
DEPRECATED RDW RBC AUTO: 40.4 FL (ref 35.1–46.3)
EGFRCR SERPLBLD CKD-EPI 2021: 93 ML/MIN/1.73M2 (ref 60–?)
EOSINOPHIL # BLD AUTO: 0.09 X10(3) UL (ref 0–0.7)
EOSINOPHIL NFR BLD AUTO: 1.9 %
ERYTHROCYTE [DISTWIDTH] IN BLOOD BY AUTOMATED COUNT: 12.4 % (ref 11–15)
EST. AVERAGE GLUCOSE BLD GHB EST-MCNC: 166 MG/DL (ref 68–126)
FASTING STATUS PATIENT QL REPORTED: YES
GLOBULIN PLAS-MCNC: 2.2 G/DL (ref 2–3.5)
GLUCOSE BLD-MCNC: 145 MG/DL (ref 70–99)
HBA1C MFR BLD: 7.4 % (ref ?–5.7)
HCT VFR BLD AUTO: 38.3 % (ref 39–53)
HGB BLD-MCNC: 13.1 G/DL (ref 13–17.5)
IMM GRANULOCYTES # BLD AUTO: 0.02 X10(3) UL (ref 0–1)
IMM GRANULOCYTES NFR BLD: 0.4 %
LYMPHOCYTES # BLD AUTO: 1.52 X10(3) UL (ref 1–4)
LYMPHOCYTES NFR BLD AUTO: 31.3 %
MCH RBC QN AUTO: 30.3 PG (ref 26–34)
MCHC RBC AUTO-ENTMCNC: 34.2 G/DL (ref 31–37)
MCV RBC AUTO: 88.5 FL (ref 80–100)
MONOCYTES # BLD AUTO: 0.38 X10(3) UL (ref 0.1–1)
MONOCYTES NFR BLD AUTO: 7.8 %
NEUTROPHILS # BLD AUTO: 2.82 X10 (3) UL (ref 1.5–7.7)
NEUTROPHILS # BLD AUTO: 2.82 X10(3) UL (ref 1.5–7.7)
NEUTROPHILS NFR BLD AUTO: 58 %
OSMOLALITY SERPL CALC.SUM OF ELEC: 292 MOSM/KG (ref 275–295)
PLATELET # BLD AUTO: 207 10(3)UL (ref 150–450)
POTASSIUM SERPL-SCNC: 4.1 MMOL/L (ref 3.5–5.1)
PROT SERPL-MCNC: 6.7 G/DL (ref 5.7–8.2)
RBC # BLD AUTO: 4.33 X10(6)UL (ref 3.8–5.8)
SODIUM SERPL-SCNC: 140 MMOL/L (ref 136–145)
WBC # BLD AUTO: 4.9 X10(3) UL (ref 4–11)

## 2025-07-01 PROCEDURE — 83036 HEMOGLOBIN GLYCOSYLATED A1C: CPT | Performed by: INTERNAL MEDICINE

## 2025-07-01 PROCEDURE — 80053 COMPREHEN METABOLIC PANEL: CPT | Performed by: INTERNAL MEDICINE

## 2025-07-01 PROCEDURE — 36415 COLL VENOUS BLD VENIPUNCTURE: CPT | Performed by: INTERNAL MEDICINE

## 2025-07-01 PROCEDURE — 85025 COMPLETE CBC W/AUTO DIFF WBC: CPT | Performed by: INTERNAL MEDICINE

## 2025-07-08 ENCOUNTER — OFFICE VISIT (OUTPATIENT)
Age: 70
End: 2025-07-08
Payer: MEDICARE

## 2025-07-08 VITALS
TEMPERATURE: 97 F | SYSTOLIC BLOOD PRESSURE: 98 MMHG | OXYGEN SATURATION: 98 % | BODY MASS INDEX: 32 KG/M2 | WEIGHT: 197 LBS | HEART RATE: 81 BPM | DIASTOLIC BLOOD PRESSURE: 70 MMHG

## 2025-07-08 DIAGNOSIS — E78.2 MIXED HYPERLIPIDEMIA: ICD-10-CM

## 2025-07-08 DIAGNOSIS — N52.9 ERECTILE DYSFUNCTION, UNSPECIFIED ERECTILE DYSFUNCTION TYPE: ICD-10-CM

## 2025-07-08 DIAGNOSIS — E11.9 TYPE 2 DIABETES MELLITUS WITHOUT COMPLICATION, WITHOUT LONG-TERM CURRENT USE OF INSULIN (HCC): ICD-10-CM

## 2025-07-08 DIAGNOSIS — I10 ESSENTIAL HYPERTENSION: ICD-10-CM

## 2025-07-08 DIAGNOSIS — E11.65 TYPE 2 DIABETES MELLITUS WITH HYPERGLYCEMIA, WITHOUT LONG-TERM CURRENT USE OF INSULIN (HCC): Primary | ICD-10-CM

## 2025-07-08 PROCEDURE — 3051F HG A1C>EQUAL 7.0%<8.0%: CPT | Performed by: INTERNAL MEDICINE

## 2025-07-08 PROCEDURE — 1160F RVW MEDS BY RX/DR IN RCRD: CPT | Performed by: INTERNAL MEDICINE

## 2025-07-08 PROCEDURE — 99214 OFFICE O/P EST MOD 30 MIN: CPT | Performed by: INTERNAL MEDICINE

## 2025-07-08 PROCEDURE — 1159F MED LIST DOCD IN RCRD: CPT | Performed by: INTERNAL MEDICINE

## 2025-07-08 PROCEDURE — G2211 COMPLEX E/M VISIT ADD ON: HCPCS | Performed by: INTERNAL MEDICINE

## 2025-07-08 PROCEDURE — 3078F DIAST BP <80 MM HG: CPT | Performed by: INTERNAL MEDICINE

## 2025-07-08 PROCEDURE — 3074F SYST BP LT 130 MM HG: CPT | Performed by: INTERNAL MEDICINE

## 2025-07-08 RX ORDER — FEXOFENADINE HCL 180 MG/1
180 TABLET ORAL DAILY
Qty: 90 TABLET | Refills: 1 | Status: SHIPPED | OUTPATIENT
Start: 2025-07-08

## 2025-07-08 RX ORDER — TADALAFIL 20 MG/1
20 TABLET ORAL AS NEEDED
Qty: 10 TABLET | Refills: 3 | Status: SHIPPED | OUTPATIENT
Start: 2025-07-08

## 2025-07-08 RX ORDER — SIMVASTATIN 20 MG
20 TABLET ORAL NIGHTLY
Qty: 90 TABLET | Refills: 1 | Status: SHIPPED | OUTPATIENT
Start: 2025-07-08

## 2025-07-08 RX ORDER — LOSARTAN POTASSIUM 25 MG/1
25 TABLET ORAL DAILY
Qty: 90 TABLET | Refills: 1 | Status: SHIPPED | OUTPATIENT
Start: 2025-07-08

## 2025-07-08 RX ORDER — OLOPATADINE HYDROCHLORIDE 1 MG/ML
1 SOLUTION OPHTHALMIC 2 TIMES DAILY
Qty: 1 EACH | Refills: 1 | Status: SHIPPED | OUTPATIENT
Start: 2025-07-08

## 2025-07-08 NOTE — PROGRESS NOTES
[facility] part of [facility]  Return Patient Progress Note      HPI:     Chief Complaint   Patient presents with    Follow - Up     3 month follow up, completed blood work 07/01/25       Bhargav Love is a 69 year old male presenting for:    Has a significant  has a past medical history of Essential hypertension, History of carcinoma in situ of bladder, Hyperlipidemia, Type 2 diabetes mellitus without complication, without long-term current use of insulin (HCC), and Visual impairment.    Here for follow up.      DM II on metformin.  Controlled.  A1c 7/1/2025 7.4.        Labs:   CMP:  Lab Results   Component Value Date/Time     07/01/2025 10:07 AM    K 4.1 07/01/2025 10:07 AM     07/01/2025 10:07 AM    CO2 28.0 07/01/2025 10:07 AM    CREATSERUM 0.87 07/01/2025 10:07 AM    CA 9.1 07/01/2025 10:07 AM     (H) 07/01/2025 10:07 AM    TP 6.7 07/01/2025 10:07 AM    ALB 4.5 07/01/2025 10:07 AM    ALKPHO 58 07/01/2025 10:07 AM    AST 17 07/01/2025 10:07 AM    ALT 15 07/01/2025 10:07 AM    BILT 0.7 07/01/2025 10:07 AM        CBC:  Lab Results   Component Value Date    WBC 4.9 07/01/2025    HGB 13.1 07/01/2025    HCT 38.3 (L) 07/01/2025    .0 07/01/2025    NEPERCENT 58.0 07/01/2025    LYPERCENT 31.3 07/01/2025    MOPERCENT 7.8 07/01/2025    EOPERCENT 1.9 07/01/2025    BAPERCENT 0.6 07/01/2025    NE 2.82 07/01/2025    LYMABS 1.52 07/01/2025    MOABSO 0.38 07/01/2025    EOABSO 0.09 07/01/2025    BAABSO 0.03 07/01/2025          Hemoglobin A1C, Microalbumin  Lab Results   Component Value Date/Time    A1C 7.4 (H) 07/01/2025 10:07 AM        Lipid panel  Lab Results   Component Value Date/Time    CHOLEST 132 09/30/2024 01:26 PM    HDL 51 09/30/2024 01:26 PM    TRIG 95 09/30/2024 01:26 PM    LDL 63 09/30/2024 01:26 PM    NONHDLC 81 09/30/2024 01:26 PM        Medications:  Current Medications[1]   PMH:  Past Medical History[2]            REVIEW OF SYSTEMS:   Review of Systems    Constitutional:  Negative for chills, fatigue, fever and unexpected weight change.   HENT:  Negative for congestion, ear pain, hearing loss, rhinorrhea, sinus pain and sore throat.    Eyes:  Negative for pain, redness and visual disturbance.   Respiratory:  Negative for apnea, cough, chest tightness, shortness of breath and wheezing.    Cardiovascular:  Negative for chest pain, palpitations and leg swelling.   Gastrointestinal:  Negative for abdominal distention, abdominal pain, blood in stool, constipation and nausea.   Endocrine: Negative for cold intolerance, heat intolerance and polyuria.   Genitourinary:  Negative for dysuria, hematuria and urgency.   Musculoskeletal:  Negative for arthralgias, back pain, gait problem, joint swelling, myalgias and neck pain.   Skin:  Negative for rash and wound.   Allergic/Immunologic: Negative for food allergies and immunocompromised state.   Neurological:  Negative for dizziness, seizures, facial asymmetry, speech difficulty, weakness, light-headedness, numbness and headaches.   Hematological:  Negative for adenopathy. Does not bruise/bleed easily.   Psychiatric/Behavioral:  Negative for behavioral problems, sleep disturbance and suicidal ideas. The patient is not nervous/anxious.             PHYSICAL EXAM:   BP 98/70   Pulse 81   Temp 97.4 °F (36.3 °C)   Wt 197 lb (89.4 kg)   SpO2 98%   BMI 31.80 kg/m²  Estimated body mass index is 31.8 kg/m² as calculated from the following:    Height as of 6/9/25: 5' 6\" (1.676 m).    Weight as of this encounter: 197 lb (89.4 kg).     Wt Readings from Last 3 Encounters:   07/08/25 197 lb (89.4 kg)   06/09/25 196 lb (88.9 kg)   04/01/25 196 lb (88.9 kg)       Physical Exam  Vitals reviewed.   Constitutional:       General: He is not in acute distress.     Appearance: He is well-developed.   HENT:      Head: Normocephalic and atraumatic.   Eyes:      Conjunctiva/sclera: Conjunctivae normal.      Pupils: Pupils are equal, round, and  reactive to light.   Neck:      Thyroid: No thyromegaly.   Cardiovascular:      Rate and Rhythm: Normal rate and regular rhythm.      Heart sounds: Normal heart sounds, S1 normal and S2 normal. No murmur heard.     No friction rub. No gallop.   Pulmonary:      Effort: Pulmonary effort is normal. No respiratory distress.      Breath sounds: Normal breath sounds. No wheezing or rales.   Chest:      Chest wall: No tenderness.   Abdominal:      General: Bowel sounds are normal. There is no distension.      Palpations: Abdomen is soft. There is no mass.      Tenderness: There is no abdominal tenderness. There is no guarding or rebound.   Musculoskeletal:         General: No tenderness. Normal range of motion.      Cervical back: Normal range of motion.   Lymphadenopathy:      Cervical: No cervical adenopathy.   Skin:     General: Skin is warm.      Findings: No erythema or rash.   Neurological:      Mental Status: He is alert and oriented to person, place, and time.      Cranial Nerves: No cranial nerve deficit.      Deep Tendon Reflexes: Reflexes are normal and symmetric.   Psychiatric:         Behavior: Behavior normal.         Thought Content: Thought content normal.         Judgment: Judgment normal.         Bilateral barefoot skin diabetic exam is normal, visualized feet and the appearance is normal.  Bilateral monofilament/sensation of both feet is normal.  Pulsation pedal pulse exam of both lower legs/feet is normal as well.      ASSESSMENT AND PLAN:   Patient is a 69 year old male who presents primarily presents for:    (E11.65) Type 2 diabetes mellitus with hyperglycemia, without long-term current use of insulin (HCC)  (primary encounter diagnosis)  Well controlled.      (I10) Essential hypertension  On losartan 25 daily.      (E78.2) Mixed hyperlipidemia  Plan: On statin.     (N52.9) Erectile dysfunction, unspecified erectile dysfunction type  Plan: Tadalafil (CIALIS) 20 MG Oral Tab                    Health  Maintenance:    Health Maintenance   Topic Date Due    COVID-19 Vaccine (4 - 2024-25 season) 09/01/2024    Diabetes Care Foot Exam  01/17/2025    Influenza Vaccine (1) 10/01/2025    Diabetes Care A1C  01/01/2026    Diabetes Care: GFR  07/01/2026    Diabetes Care Dilated Eye Exam  11/11/2026    PSA  07/01/2027    Colorectal Cancer Screening  04/19/2028    Annual Well Visit  Completed    Annual Depression Screening  Completed    Fall Risk Screening (Annual)  Completed    Diabetes Care: Microalb/Creat Ratio (Annual)  Completed    Pneumococcal Vaccine: 50+ Years  Completed    Zoster Vaccines  Completed    Meningococcal B Vaccine  Aged Out         Meds & Refills for this Visit:  Requested Prescriptions     Signed Prescriptions Disp Refills    losartan 25 MG Oral Tab 90 tablet 1     Sig: Take 1 tablet (25 mg total) by mouth daily.    simvastatin 20 MG Oral Tab 90 tablet 1     Sig: Take 1 tablet (20 mg total) by mouth nightly.    fexofenadine 180 MG Oral Tab 90 tablet 1     Sig: Take 1 tablet (180 mg total) by mouth daily.    olopatadine (PATADAY) 0.1 % Ophthalmic Solution 1 each 1     Sig: Place 1 drop into both eyes 2 (two) times daily.    Tadalafil (CIALIS) 20 MG Oral Tab 10 tablet 3     Sig: Take 1 tablet (20 mg total) by mouth as needed for Erectile Dysfunction.       No orders of the defined types were placed in this encounter.      Imaging & Consults:  None          No follow-ups on file.  Important follow up notes/labs for next visit      Patient indicates understanding of the above recommendations and agrees to the above plan.  Reasurrance and education provided. All questions answered.    Notified to call with any questions, complications, allergies, or worsening or changing symptoms as well as any side effects or complications from the treatments .  Red flags/ ER precautions discussed.    If diagnostic labs or imaging ordered advised patient to contact my office for results  24-48 hours after completion    This  note was dictated using dragon speech recognition transcription software.  Typographical and transcription errors may be present.  Please call if any questions.       As part of our commitment to providing you with comprehensive, transparent, and timely access to your health information, we adhere to the guidelines set forth by the 21st Century Cures Act. This Act enhances your rights to access your electronic health information and ensures that you can easily obtain your medical records.  Please note that the verbage used in this note is intended for medical documentation and communication and may be interpreted as forthright.  Please do not hesitate to contact my office if you have any questions.            Wali Patricia MD  [facility]                         [1]   Current Outpatient Medications   Medication Sig Dispense Refill    losartan 25 MG Oral Tab Take 1 tablet (25 mg total) by mouth daily. 90 tablet 1    simvastatin 20 MG Oral Tab Take 1 tablet (20 mg total) by mouth nightly. 90 tablet 1    fexofenadine 180 MG Oral Tab Take 1 tablet (180 mg total) by mouth daily. 90 tablet 1    olopatadine (PATADAY) 0.1 % Ophthalmic Solution Place 1 drop into both eyes 2 (two) times daily. 1 each 1    Tadalafil (CIALIS) 20 MG Oral Tab Take 1 tablet (20 mg total) by mouth as needed for Erectile Dysfunction. 10 tablet 3    metFORMIN HCl 1000 MG Oral Tab TOME 1 TABLETA DOS VECES AL MICHAEL CON LAS COMIDAS JERARDO INDICADO 180 tablet 3    Accu-Chek Softclix Lancets Does not apply Misc 1 Lancet by Finger stick route in the morning, at noon, and at bedtime. Use as directed. 100 each 2    Glucose Blood (ACCU-CHEK GUIDE TEST) In Vitro Strip Check three times daily 300 each 3   [2]   Past Medical History:   Essential hypertension    History of carcinoma in situ of bladder    20+ years ago    Hyperlipidemia    Type 2 diabetes mellitus without complication, without long-term current use of insulin (HCC)    Visual impairment    glasses

## 2025-07-11 ENCOUNTER — TELEPHONE (OUTPATIENT)
Age: 70
End: 2025-07-11

## 2025-07-11 NOTE — TELEPHONE ENCOUNTER
Tadalafil (CIALIS) 20 MG Oral Tab prior auth is awaiting completion    Please complete chart notes and route back

## 2025-07-14 ENCOUNTER — PATIENT OUTREACH (OUTPATIENT)
Dept: CASE MANAGEMENT | Age: 70
End: 2025-07-14

## 2025-07-14 NOTE — PROGRESS NOTES
The patient is maintaining good health and is stable on his chronic conditions. Patient is being graduated from [facility] and can contact their PCP office if they are in need of more guidance in the future      Patient notified and agreeable to the plan.

## 2025-07-21 NOTE — TELEPHONE ENCOUNTER
Denied    Note from payer: The Medicare rule in the Prescription Drug Benefit Manual (Chapter 6, Section 20.1) says drugs used for the treatment of erectile dysfunction (ED) are excluded from Part D coverage. Your drug is prescribed for ED. Per Medicare rules, it is not covered.  Payer: Zaki Case ID: [id number]    [phone]    [phone]  Electronic appeal: Not supported  View History

## (undated) DIAGNOSIS — E11.9 TYPE 2 DIABETES MELLITUS WITHOUT COMPLICATION, WITHOUT LONG-TERM CURRENT USE OF INSULIN (HCC): ICD-10-CM

## (undated) DEVICE — TUBING PMP L16FT DISP

## (undated) DEVICE — 3M™ IOBAN™ 2 ANTIMICROBIAL INCISE DRAPE 6648EZ: Brand: IOBAN™ 2

## (undated) DEVICE — SLEEVE TRAC SPANDEX LAT W/ 4IN COBAN

## (undated) DEVICE — DRAPE,U/SHT,SPLIT,FILM,60X84,STERILE: Brand: MEDLINE

## (undated) DEVICE — STERILE POLYISOPRENE POWDER-FREE SURGICAL GLOVES: Brand: PROTEXIS

## (undated) DEVICE — SHOULDER ARTHROSCOPY CDS-LF: Brand: MEDLINE INDUSTRIES, INC.

## (undated) DEVICE — APPLICATOR SKIN PREP 26ML HI LT ORNG 2% CHG

## (undated) DEVICE — 3M™ STERI-DRAPE™ U-DRAPE 1015: Brand: STERI-DRAPE™

## (undated) DEVICE — ENDOSCOPY PACK - LOWER: Brand: MEDLINE INDUSTRIES, INC.

## (undated) DEVICE — CANNULA ARTHSCP L7CM DIA7MM TRNSLUC THRD FLX

## (undated) DEVICE — PAD,ABDOMINAL,8"X7.5",ST,LF,20/BX: Brand: MEDLINE INDUSTRIES, INC.

## (undated) DEVICE — DRESSING WET L16XW3IN OIL EMUL N ADH CURAD

## (undated) DEVICE — DUAL SPIKE ADAPTER: Brand: CONMED

## (undated) DEVICE — SUTURE MCRYL SZ 4-0 L18IN ABSRB UD L19MM PS-2

## (undated) DEVICE — GAUZE,SPONGE,FLUFF,6"X6.75",STRL,5/TRAY: Brand: MEDLINE

## (undated) DEVICE — COVER LT HNDL PLAS RIG 2 PER PK

## (undated) DEVICE — [TOMCAT CUTTER, ARTHROSCOPIC SHAVER BLADE,  DO NOT RESTERILIZE,  DO NOT USE IF PACKAGE IS DAMAGED,  KEEP DRY,  KEEP AWAY FROM SUNLIGHT]: Brand: FORMULA

## (undated) DEVICE — COVER,MAYO STAND,STERILE: Brand: MEDLINE

## (undated) DEVICE — [STANDARD 12-FLUTE BARREL BUR, ARTHROSCOPIC SHAVER BLADE,  DO NOT RESTERILIZE,  DO NOT USE IF PACKAGE IS DAMAGED,  KEEP DRY,  KEEP AWAY FROM SUNLIGHT]: Brand: FORMULA

## (undated) DEVICE — SHEET,DRAPE,70X100,STERILE: Brand: MEDLINE

## (undated) DEVICE — STERILE POLYISOPRENE POWDER-FREE SURGICAL GLOVES WITH EMOLLIENT COATING: Brand: PROTEXIS

## (undated) DEVICE — LACTATED. R IRRIG

## (undated) DEVICE — SLEEVE COMPR M KNEE LEN SGL USE KENDALL SCD

## (undated) DEVICE — NEEDLE SUT PASS FOR RC LABRAL REP MULTFI

## (undated) DEVICE — GOWN SURG AERO CHROME XXL

## (undated) DEVICE — Device: Brand: DEFENDO AIR/WATER/SUCTION AND BIOPSY VALVE

## (undated) DEVICE — CANNULA ARTHSCP L7CM DIA8.25MM NOTCH TWST-IN

## (undated) DEVICE — SUPER TURBOVAC 90 IFS: Brand: COBLATION

## (undated) DEVICE — SUTURE ARTHSCP SUTURETAPE 1.3 W/TPR NDL

## (undated) DEVICE — SUTURE ETHLN SZ 2-0 L18IN NONABSORB BLK

## (undated) DEVICE — MEDI-VAC NON-CONDUCTIVE SUCTION TUBING: Brand: CARDINAL HEALTH

## (undated) NOTE — LETTER
Patient Name: True Mosqueda  : 1955  MRN: [id number]  Patient Address: [address] [place] [address]        Enfermedad del Coronavirus 2019 (COVID-19)     [facility] tiene un compromiso con la seguridad y Melany Mar de nues 1. Permanezca en el hogar alejado(a) del trabajo, escuela y de otros Lorna Major. Si usted tiene que salir, evite usar cualquier tipo de transporte público, desplazamiento compartido o taxis. 2. Vigile coral síntomas con cuidado.  Si coral síntomas empeo Si está esperando los resultados de la prueba o se le ha confirmado mandy positivo por COVID-19, y coral síntomas empeoran en el hogar con síntomas tales mandy: debilidad extrema, dificultad para respirar, o fiebre que no baja mayor a 100.4 grados Fahrenheit, Seguimiento después de ser dado de suly    Llame a mejia médico de Jameel Lopes ervin después de ser dado de suly para agendar un seguimiento de deborah a distancia. El CDC no recomienda repetir la prueba después de teresa salido positivo.     Programa de Centros para Control y Prevención de [place] ([facility]) / Hermelinda Debar si usted está enfermo(a) con la enfermedad del coronavirus 2019, [email or web address]  Centros para el Control y [place]

## (undated) NOTE — LETTER
OUTSIDE TESTING RESULT REQUEST     IMPORTANT: FOR YOUR IMMEDIATE ATTENTION  Please FAX all test results listed below to: [phone]     Testing already done on or about: appointment 23     * * * * If testing is NOT complete, arrange with patient A.S.A.P. * * * *      Patient Name: Leatha Keys  Surgery Date: 2023  Medical Record: [id number]  CSN: [id number]  : 1955 - A: 76 y     Sex: male  Surgeon(s):  Kandi Beckford MD  Procedure: RIGHT SHOULDER ARTHROSCOPY ROTATOR CUFF REPAIR, SUBACROMIAL DECOMPRESSION, DEBRIDEMENT, POSSIBLE DISTAL CLAVICULECTOMY. Anesthesia Type: General     Surgeon: Kandi Beckford MD     The following Testing and Time Line are REQUIRED PER ANESTHESIA     EKG READ AND SIGNED WITHIN   90 days  BMP (requires 4 hour fast) within  90 days      Thank You,   Sent by: Mabel Donahue RN

## (undated) NOTE — LETTER
July 26, 2021    Telma Wells  [address] [facility] [address]      Reese Bhargav:   Aliya resultados estan normal. Por favor continua tomando mejia medicina de colesterol mandy si miriam Holt.      Results for orders placed or performed in visit

## (undated) NOTE — LETTER
April 3, 2018    [address]  [address]     Patient: Herbert Sinha   YOB: 1955   Date of Visit: 4/3/2018       Dear Dr. Maria Del Carmen Garay, DO:    Thank you for referring Herbert Sinha to me for evaluation.  Here topically 2 (two) times daily as needed. Disp: 30 g Rfl: 3   ASPIRIN EC LOW DOSE 81 MG Oral Tab EC Take 1 tablet (81 mg total) by mouth daily.  Disp: 90 tablet Rfl: 3       Allergies:  No Known Allergies    ROS:     ROS     Positive for: Endocrine    Negati Diabetes type II: no background of retinopathy, no signs of neovascularization noted. Discussed ocular and systemic benefits of blood sugar control. Diagnosis and treatment discussed in detail with patient.     Age-related nuclear cataract of both eyes  D

## (undated) NOTE — LETTER
23    Orthopedic Surgery   Pre-Operative Clearance Request    Patient Name:   Cathleen Segura             :   1955    Surgeon: Dr. Ze Batres             Date of Surgery: 23    Surgical Procedure: RIGHT SHOULDER ARTHROSCOPY ROTATOR CUFF REPAIR, SUBACROMIAL DECOMPRESSION, DEBRIDEMENT POSSIBLE DISTAL CLAVICULECTOMY. Please complete all of the following 2-3 weeks PRIOR TO your scheduled surgery to avoid potential cancellation. [x]  History and Physical        [x]  Medical  Clearance                     Required pre-op testing to be ordered:N/A                           **Please fax test results, H&P, and clearance to [phone] and to [facility] at [phone]**

## (undated) NOTE — LETTER
January 16, 2021 [address] [facility]      Dear Kim Joyce:    The following are the results of your recent tests. Please review the list of test results.   Your result is the value on the left; we have also supplied the

## (undated) NOTE — LETTER
May 15, 2024    Bhargav [place] [address] [address] [facility] [address]      Querida Bhargav:  Fue un placer hablar con abiodun y Mily por teléfono recientemente. Para el seguimiento, quería enviarle mi información de contacto para utilizar cuando usted tiene ernst pregunta y / o necesita alguna ayuda. Estamos muy contentos de que haya decidido darle un intento a nuestro programa de Gestión de Cuidado Crónico. Estoy disponible para proporcionarle el apoyo y la educación necesarios para mantenerlo mumtaz.  Juntos trabajaremos en:  Coordinación de la atención: ayuda a reducir pedidos / pruebas duplicados para ahorrar tiempo y dinero  Medicamentos: revisar, educar y discutir cualquier preocupación o problema que pueda tener  Educación personalizada y apoyo con respecto a mejia deborah.  Estos servicios, entre otros, le ayudarán a susan el control de mejia deborah ya proporcionarle ernst atención de calidad óptima. He adjuntado ernst revisión más en profundidad del programa para ayudar a esbozar la información que habíamos hablado por teléfono. Si tiene alguna pregunta no dude en ponerse en contacto conmigo mismo y mejia compañía de seguros para obtener más detalles.  Espero con interés trabajar con usted  Winifred Castro, ARSALAN [facility]  Winifred Castro  [facility]  Care ManagAleda E. [facility] Population [address] [id number] [phone] [email or web address]